# Patient Record
Sex: FEMALE | Race: WHITE | NOT HISPANIC OR LATINO | URBAN - METROPOLITAN AREA
[De-identification: names, ages, dates, MRNs, and addresses within clinical notes are randomized per-mention and may not be internally consistent; named-entity substitution may affect disease eponyms.]

---

## 2017-07-25 ENCOUNTER — EMERGENCY (EMERGENCY)
Facility: HOSPITAL | Age: 22
LOS: 1 days | Discharge: HOME | End: 2017-07-25
Attending: EMERGENCY MEDICINE | Admitting: EMERGENCY MEDICINE
Payer: COMMERCIAL

## 2017-07-25 VITALS
OXYGEN SATURATION: 97 % | SYSTOLIC BLOOD PRESSURE: 142 MMHG | WEIGHT: 136.03 LBS | RESPIRATION RATE: 16 BRPM | DIASTOLIC BLOOD PRESSURE: 83 MMHG | HEART RATE: 84 BPM | TEMPERATURE: 98 F

## 2017-07-25 DIAGNOSIS — F41.0 PANIC DISORDER [EPISODIC PAROXYSMAL ANXIETY]: ICD-10-CM

## 2017-07-25 DIAGNOSIS — E78.5 HYPERLIPIDEMIA, UNSPECIFIED: ICD-10-CM

## 2017-07-25 DIAGNOSIS — F43.0 ACUTE STRESS REACTION: ICD-10-CM

## 2017-07-25 PROCEDURE — 99282 EMERGENCY DEPT VISIT SF MDM: CPT

## 2017-07-25 PROCEDURE — 99284 EMERGENCY DEPT VISIT MOD MDM: CPT | Mod: 25
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
## 2017-07-25 NOTE — ED ADULT NURSE NOTE - CHPI ED SYMPTOMS NEG
no diaphoresis/no fever/no cough/no nausea/no chills/no vomiting/no syncope/no back pain/no chest pain/no shortness of breath/no dizziness

## 2017-08-29 ENCOUNTER — TRANSCRIPTION ENCOUNTER (OUTPATIENT)
Age: 22
End: 2017-08-29

## 2017-09-01 ENCOUNTER — TRANSCRIPTION ENCOUNTER (OUTPATIENT)
Age: 22
End: 2017-09-01

## 2018-03-01 ENCOUNTER — EMERGENCY (EMERGENCY)
Facility: HOSPITAL | Age: 23
LOS: 1 days | Discharge: HOME | End: 2018-03-01
Attending: EMERGENCY MEDICINE | Admitting: EMERGENCY MEDICINE
Payer: COMMERCIAL

## 2018-03-01 VITALS
HEART RATE: 89 BPM | TEMPERATURE: 98 F | SYSTOLIC BLOOD PRESSURE: 137 MMHG | OXYGEN SATURATION: 100 % | RESPIRATION RATE: 16 BRPM | DIASTOLIC BLOOD PRESSURE: 78 MMHG | HEIGHT: 63 IN | WEIGHT: 134.92 LBS

## 2018-03-01 VITALS
OXYGEN SATURATION: 99 % | DIASTOLIC BLOOD PRESSURE: 71 MMHG | TEMPERATURE: 98 F | SYSTOLIC BLOOD PRESSURE: 129 MMHG | HEART RATE: 81 BPM | RESPIRATION RATE: 17 BRPM

## 2018-03-01 DIAGNOSIS — Z98.890 OTHER SPECIFIED POSTPROCEDURAL STATES: Chronic | ICD-10-CM

## 2018-03-01 LAB
ALBUMIN SERPL ELPH-MCNC: 4.4 G/DL — SIGNIFICANT CHANGE UP (ref 3.3–5)
ALP SERPL-CCNC: 62 U/L — SIGNIFICANT CHANGE UP (ref 40–120)
ALT FLD-CCNC: 23 U/L — SIGNIFICANT CHANGE UP (ref 12–78)
ANION GAP SERPL CALC-SCNC: 9 MMOL/L — SIGNIFICANT CHANGE UP (ref 5–17)
AST SERPL-CCNC: 24 U/L — SIGNIFICANT CHANGE UP (ref 15–37)
BASOPHILS # BLD AUTO: 0.1 K/UL — SIGNIFICANT CHANGE UP (ref 0–0.2)
BASOPHILS NFR BLD AUTO: 1.3 % — SIGNIFICANT CHANGE UP (ref 0–2)
BILIRUB SERPL-MCNC: 0.5 MG/DL — SIGNIFICANT CHANGE UP (ref 0.2–1.2)
BUN SERPL-MCNC: 18 MG/DL — SIGNIFICANT CHANGE UP (ref 7–23)
CALCIUM SERPL-MCNC: 9.5 MG/DL — SIGNIFICANT CHANGE UP (ref 8.5–10.1)
CHLORIDE SERPL-SCNC: 108 MMOL/L — SIGNIFICANT CHANGE UP (ref 96–108)
CK MB BLD-MCNC: <0.3 % — SIGNIFICANT CHANGE UP (ref 0–3.5)
CK MB CFR SERPL CALC: <0.5 NG/ML — SIGNIFICANT CHANGE UP (ref 0–3.6)
CK SERPL-CCNC: 147 U/L — SIGNIFICANT CHANGE UP (ref 26–192)
CO2 SERPL-SCNC: 24 MMOL/L — SIGNIFICANT CHANGE UP (ref 22–31)
CREAT SERPL-MCNC: 1.1 MG/DL — SIGNIFICANT CHANGE UP (ref 0.5–1.3)
D DIMER BLD IA.RAPID-MCNC: <150 NG/ML DDU — SIGNIFICANT CHANGE UP
EOSINOPHIL # BLD AUTO: 0.1 K/UL — SIGNIFICANT CHANGE UP (ref 0–0.5)
EOSINOPHIL NFR BLD AUTO: 0.9 % — SIGNIFICANT CHANGE UP (ref 0–6)
GLUCOSE SERPL-MCNC: 92 MG/DL — SIGNIFICANT CHANGE UP (ref 70–99)
HCG SERPL-ACNC: <1 MIU/ML — SIGNIFICANT CHANGE UP
HCT VFR BLD CALC: 40.9 % — SIGNIFICANT CHANGE UP (ref 34.5–45)
HGB BLD-MCNC: 14.1 G/DL — SIGNIFICANT CHANGE UP (ref 11.5–15.5)
LYMPHOCYTES # BLD AUTO: 2.7 K/UL — SIGNIFICANT CHANGE UP (ref 1–3.3)
LYMPHOCYTES # BLD AUTO: 33.6 % — SIGNIFICANT CHANGE UP (ref 13–44)
MCHC RBC-ENTMCNC: 31 PG — SIGNIFICANT CHANGE UP (ref 27–34)
MCHC RBC-ENTMCNC: 34.4 GM/DL — SIGNIFICANT CHANGE UP (ref 32–36)
MCV RBC AUTO: 90.2 FL — SIGNIFICANT CHANGE UP (ref 80–100)
MONOCYTES # BLD AUTO: 0.6 K/UL — SIGNIFICANT CHANGE UP (ref 0–0.9)
MONOCYTES NFR BLD AUTO: 7.4 % — SIGNIFICANT CHANGE UP (ref 1–9)
NEUTROPHILS # BLD AUTO: 4.6 K/UL — SIGNIFICANT CHANGE UP (ref 1.8–7.4)
NEUTROPHILS NFR BLD AUTO: 56.8 % — SIGNIFICANT CHANGE UP (ref 43–77)
PLATELET # BLD AUTO: 256 K/UL — SIGNIFICANT CHANGE UP (ref 150–400)
POTASSIUM SERPL-MCNC: 4.4 MMOL/L — SIGNIFICANT CHANGE UP (ref 3.5–5.3)
POTASSIUM SERPL-SCNC: 4.4 MMOL/L — SIGNIFICANT CHANGE UP (ref 3.5–5.3)
PROT SERPL-MCNC: 8.6 G/DL — HIGH (ref 6–8.3)
RBC # BLD: 4.54 M/UL — SIGNIFICANT CHANGE UP (ref 3.8–5.2)
RBC # FLD: 10.8 % — SIGNIFICANT CHANGE UP (ref 10.3–14.5)
SODIUM SERPL-SCNC: 141 MMOL/L — SIGNIFICANT CHANGE UP (ref 135–145)
TROPONIN I SERPL-MCNC: <.015 NG/ML — SIGNIFICANT CHANGE UP (ref 0.01–0.04)
WBC # BLD: 8.1 K/UL — SIGNIFICANT CHANGE UP (ref 3.8–10.5)
WBC # FLD AUTO: 8.1 K/UL — SIGNIFICANT CHANGE UP (ref 3.8–10.5)

## 2018-03-01 PROCEDURE — 82550 ASSAY OF CK (CPK): CPT

## 2018-03-01 PROCEDURE — 84484 ASSAY OF TROPONIN QUANT: CPT

## 2018-03-01 PROCEDURE — 84702 CHORIONIC GONADOTROPIN TEST: CPT

## 2018-03-01 PROCEDURE — 85027 COMPLETE CBC AUTOMATED: CPT

## 2018-03-01 PROCEDURE — 71046 X-RAY EXAM CHEST 2 VIEWS: CPT

## 2018-03-01 PROCEDURE — 99284 EMERGENCY DEPT VISIT MOD MDM: CPT | Mod: 25

## 2018-03-01 PROCEDURE — 99284 EMERGENCY DEPT VISIT MOD MDM: CPT

## 2018-03-01 PROCEDURE — 80053 COMPREHEN METABOLIC PANEL: CPT

## 2018-03-01 PROCEDURE — 96374 THER/PROPH/DIAG INJ IV PUSH: CPT

## 2018-03-01 PROCEDURE — 93005 ELECTROCARDIOGRAM TRACING: CPT

## 2018-03-01 PROCEDURE — 82553 CREATINE MB FRACTION: CPT

## 2018-03-01 PROCEDURE — 71046 X-RAY EXAM CHEST 2 VIEWS: CPT | Mod: 26

## 2018-03-01 PROCEDURE — 85379 FIBRIN DEGRADATION QUANT: CPT

## 2018-03-01 RX ORDER — KETOROLAC TROMETHAMINE 30 MG/ML
15 SYRINGE (ML) INJECTION ONCE
Qty: 0 | Refills: 0 | Status: DISCONTINUED | OUTPATIENT
Start: 2018-03-01 | End: 2018-03-01

## 2018-03-01 RX ADMIN — Medication 15 MILLIGRAM(S): at 15:52

## 2018-03-01 NOTE — ED PROVIDER NOTE - PROGRESS NOTE DETAILS
patient resting comfortably, results discussed, father at bedside, advised follow up with her cardiologist, copy of results given

## 2018-03-01 NOTE — ED ADULT NURSE NOTE - OBJECTIVE STATEMENT
Patient is stable and able to make her needs known c/o chest pain cardiac work up complete. No acute distress noted. MSpencer

## 2018-03-01 NOTE — ED PROVIDER NOTE - MEDICAL DECISION MAKING DETAILS
left chest pain, ekg nsr, hx of afib with ablation, will obtain labs, cxr, give pain med Bossman: left chest pain, ekg nsr, hx of afib with ablation, will obtain labs, cxr, give pain med

## 2018-03-01 NOTE — ED PROVIDER NOTE - OBJECTIVE STATEMENT
22 y female presents with episode of nonradiating sharp left chest pain, states began today while lying down watching tv, pain was a 10 at the time, has now resolved, has developed dry cough since arriving to the ED, states she has had intermittent sob with activityfor several months.  hx of afib 1 years ago , had ablation done by Cardiologist Dr Rosales, at [facility].  no new meds, no new diets, nonsmoker, no etoh, denies trauma or strain,  states she takes synthroid for hypothyroid , states she traveled to [place] in January.  PMD Dr Taylor

## 2018-08-14 ENCOUNTER — TRANSCRIPTION ENCOUNTER (OUTPATIENT)
Age: 23
End: 2018-08-14

## 2018-08-22 ENCOUNTER — APPOINTMENT (OUTPATIENT)
Dept: CARDIOLOGY | Facility: CLINIC | Age: 23
End: 2018-08-22
Payer: COMMERCIAL

## 2018-08-22 ENCOUNTER — NON-APPOINTMENT (OUTPATIENT)
Age: 23
End: 2018-08-22

## 2018-08-22 ENCOUNTER — RESULT CHARGE (OUTPATIENT)
Age: 23
End: 2018-08-22

## 2018-08-22 VITALS
BODY MASS INDEX: 25.52 KG/M2 | HEIGHT: 63 IN | SYSTOLIC BLOOD PRESSURE: 139 MMHG | HEART RATE: 79 BPM | DIASTOLIC BLOOD PRESSURE: 82 MMHG | WEIGHT: 144 LBS | OXYGEN SATURATION: 100 %

## 2018-08-22 PROCEDURE — 93000 ELECTROCARDIOGRAM COMPLETE: CPT

## 2018-08-22 PROCEDURE — 99204 OFFICE O/P NEW MOD 45 MIN: CPT

## 2018-08-23 ENCOUNTER — NON-APPOINTMENT (OUTPATIENT)
Age: 23
End: 2018-08-23

## 2018-08-23 PROBLEM — I48.91 UNSPECIFIED ATRIAL FIBRILLATION: Chronic | Status: ACTIVE | Noted: 2018-03-01

## 2018-08-23 PROBLEM — E03.9 HYPOTHYROIDISM, UNSPECIFIED: Chronic | Status: ACTIVE | Noted: 2018-03-01

## 2018-08-24 ENCOUNTER — APPOINTMENT (OUTPATIENT)
Dept: ELECTROPHYSIOLOGY | Facility: CLINIC | Age: 23
End: 2018-08-24

## 2018-08-29 ENCOUNTER — APPOINTMENT (OUTPATIENT)
Dept: CARDIOLOGY | Facility: CLINIC | Age: 23
End: 2018-08-29
Payer: COMMERCIAL

## 2018-08-29 PROCEDURE — 93306 TTE W/DOPPLER COMPLETE: CPT

## 2019-01-09 ENCOUNTER — TRANSCRIPTION ENCOUNTER (OUTPATIENT)
Age: 24
End: 2019-01-09

## 2019-01-18 ENCOUNTER — EMERGENCY (EMERGENCY)
Facility: HOSPITAL | Age: 24
LOS: 1 days | Discharge: HOME | End: 2019-01-18
Attending: EMERGENCY MEDICINE
Payer: COMMERCIAL

## 2019-01-18 VITALS
DIASTOLIC BLOOD PRESSURE: 75 MMHG | RESPIRATION RATE: 12 BRPM | TEMPERATURE: 98 F | HEART RATE: 88 BPM | SYSTOLIC BLOOD PRESSURE: 124 MMHG | OXYGEN SATURATION: 100 %

## 2019-01-18 VITALS
OXYGEN SATURATION: 100 % | DIASTOLIC BLOOD PRESSURE: 79 MMHG | WEIGHT: 134.92 LBS | SYSTOLIC BLOOD PRESSURE: 136 MMHG | TEMPERATURE: 98 F | HEIGHT: 63 IN | RESPIRATION RATE: 14 BRPM | HEART RATE: 91 BPM

## 2019-01-18 DIAGNOSIS — Z98.890 OTHER SPECIFIED POSTPROCEDURAL STATES: Chronic | ICD-10-CM

## 2019-01-18 PROCEDURE — 99284 EMERGENCY DEPT VISIT MOD MDM: CPT

## 2019-01-18 PROCEDURE — 99283 EMERGENCY DEPT VISIT LOW MDM: CPT

## 2019-01-18 RX ORDER — DIPHENHYDRAMINE HCL 50 MG
50 CAPSULE ORAL ONCE
Qty: 0 | Refills: 0 | Status: COMPLETED | OUTPATIENT
Start: 2019-01-18 | End: 2019-01-18

## 2019-01-18 RX ORDER — LEVOTHYROXINE SODIUM 125 MCG
1 TABLET ORAL
Qty: 0 | Refills: 0 | COMMUNITY

## 2019-01-18 RX ORDER — FAMOTIDINE 10 MG/ML
20 INJECTION INTRAVENOUS ONCE
Qty: 0 | Refills: 0 | Status: COMPLETED | OUTPATIENT
Start: 2019-01-18 | End: 2019-01-18

## 2019-01-18 RX ADMIN — FAMOTIDINE 20 MILLIGRAM(S): 10 INJECTION INTRAVENOUS at 17:12

## 2019-01-18 RX ADMIN — Medication 60 MILLIGRAM(S): at 17:12

## 2019-01-18 RX ADMIN — Medication 50 MILLIGRAM(S): at 17:12

## 2019-01-18 NOTE — ED PROVIDER NOTE - OBJECTIVE STATEMENT
22 yo white female with recurrent episodes of facial swelling x years w/o identifiable etiology and despite evaluation by PCP and Dermatology. Today similar symptoms occurred described as full swelling sensation to left upper lip and under both eyes. No chest pain and no SOB. In addition, no new soaps, colognes, detergents, medication or recent illnesses. In the past patient has also experienced warm sensation to face and upper chest.

## 2019-01-18 NOTE — ED ADULT NURSE NOTE - OBJECTIVE STATEMENT
PT with chronic allgx angioedema symptoms, no SOB, has been to several dr's with little improvements, afebrile, skin intact, Dr ordered PO medications, will continue to monitor pt

## 2019-01-18 NOTE — ED ADULT TRIAGE NOTE - CHIEF COMPLAINT QUOTE
"For the last 4 days my tongue has been swelling on and off.  pt states that "all my life I have gotten blotchy and hives and my face and tongue swell on and off - I must have some sort of autoimmune disorder - today my doctor told me to go to the ER."

## 2019-01-18 NOTE — ED ADULT NURSE NOTE - NSIMPLEMENTINTERV_GEN_ALL_ED
Implemented All Universal Safety Interventions:  [place] to call system. Call bell, personal items and telephone within reach. Instruct patient to call for assistance. Room bathroom lighting operational. Non-slip footwear when patient is off stretcher. Physically safe environment: no spills, clutter or unnecessary equipment. Stretcher in lowest position, wheels locked, appropriate side rails in place.

## 2019-01-18 NOTE — ED PROVIDER NOTE - CHPI ED SYMPTOMS NEG
no difficulty breathing/no throat itching/no shortness of breath/no wheezing/no difficulty swallowing

## 2019-07-16 ENCOUNTER — TRANSCRIPTION ENCOUNTER (OUTPATIENT)
Age: 24
End: 2019-07-16

## 2019-09-15 ENCOUNTER — TRANSCRIPTION ENCOUNTER (OUTPATIENT)
Age: 24
End: 2019-09-15

## 2019-11-25 ENCOUNTER — EMERGENCY (EMERGENCY)
Facility: HOSPITAL | Age: 24
LOS: 1 days | Discharge: HOME | End: 2019-11-25
Attending: EMERGENCY MEDICINE
Payer: COMMERCIAL

## 2019-11-25 VITALS
SYSTOLIC BLOOD PRESSURE: 133 MMHG | DIASTOLIC BLOOD PRESSURE: 86 MMHG | WEIGHT: 139.99 LBS | HEIGHT: 63 IN | TEMPERATURE: 98 F | RESPIRATION RATE: 16 BRPM | OXYGEN SATURATION: 99 % | HEART RATE: 96 BPM

## 2019-11-25 DIAGNOSIS — Z98.890 OTHER SPECIFIED POSTPROCEDURAL STATES: Chronic | ICD-10-CM

## 2019-11-25 PROCEDURE — 99283 EMERGENCY DEPT VISIT LOW MDM: CPT

## 2019-11-25 NOTE — ED PROVIDER NOTE - NSFOLLOWUPCLINICS_GEN_ALL_ED_FT
[facility] Orthopedic Surgery  Orthopedic Surgery  [address], 3rd & 4th [place] [address]  Phone: [phone]  Fax:   Follow Up Time:

## 2019-11-25 NOTE — ED PROVIDER NOTE - CLINICAL SUMMARY MEDICAL DECISION MAKING FREE TEXT BOX
Patient presents with ED post ankle sprain with posterior splint in place.  Patient reports cold, purple toes.  Toes with cap refill less than 2 sec and not abnormally cold to touch.  Patient placed in stirrup splint with a less tight ace.  Patient has good f/u with her orthopedist and comfortable with plan to f/u.  Patient symptoms resolved after removal of ACE.

## 2019-11-25 NOTE — ED PROVIDER NOTE - ATTENDING CONTRIBUTION TO CARE
I, Tyrese Alberts, performed a history and physical exam of the patient and discussed their management with the resident and /or advanced care provider. I reviewed the resident and /or ACP's note and agree with the documented findings and plan of care. I was present and available for all procedures.  Patient stable for f/u, splint replaced and ACE replaced.  Symptoms resolved and patient is more comfortable and has f/u appointment with orthopedist.

## 2019-11-25 NOTE — ED PROVIDER NOTE - OBJECTIVE STATEMENT
24 y.o. female coming in with a painful swollen and cold left foot ankle after a sprain 4 days ago.  Pt inverted her ankle 4 days ago, had negative Xrays, was placed in a posterior splint and has not been weight bearing since.  Over the past couple days swelling and ecchymosis has worsened.  Also the foot has been colder than the right.  Nothing makes her complaints better, and movement makes it worse.

## 2019-11-25 NOTE — ED ADULT TRIAGE NOTE - CHIEF COMPLAINT QUOTE
Patient returned from [facility] 11/20/19  left ankle pain/injury 4 days ago, seen by orthopedist 3 days ago  Patient c/o persistent pain, swelling to the left foot

## 2019-11-25 NOTE — ED ADULT NURSE NOTE - OBJECTIVE STATEMENT
23y/o female presented to the ED from home with complaint of left ankle pain. A&Ox3. Patient states that she rolled her ankle x4 days ago, was seen in ED, negative x-rays, placed in splint and was told to follow up with ortho. Patient comes to ED today with complaint of worsening swelling and bruising of left ankle also states that the left foot feels cold even with a sock on . +pedal pulses, +cap refill.

## 2019-11-25 NOTE — ED PROCEDURE NOTE - ATTENDING CONTRIBUTION TO CARE
I, Tyrese Alberts, performed a history and physical exam of the patient and discussed their management with the resident and /or advanced care provider. I reviewed the resident and /or ACP's note and agree with the documented findings and plan of care. I was present and available for all procedures.  No complication of splint application.

## 2019-11-25 NOTE — ED PROVIDER NOTE - PATIENT PORTAL LINK FT
You can access the FollowMyHealth Patient Portal offered by [facility] by registering at the following website: [email or web address]. By joining [employer]’s FollowMyHealth portal, you will also be able to view your health information using other applications (apps) compatible with our system.

## 2019-11-25 NOTE — ED PROVIDER NOTE - NSFOLLOWUPINSTRUCTIONS_ED_ALL_ED_FT
1- Motrin 600 mg every 6 hours for pain  2- Weight bearing as tolerated  3- Follow up with orthopedics as an out patient.  4- Any worsening pain, numbness, weakness, or any other concerns return to ER immediately

## 2019-11-25 NOTE — ED ADULT NURSE NOTE - NS_SISCREENINGSR_GEN_ALL_ED
Reason For Visit  PARDEEP PATTON is an established patient here today for an annual physical.   A chaperone is not applicable.        Quality    Adult Wellness CI height documented, discussion of regular exercise, exercising regularly, printed information given for activities, discussion of nutritional quality of diet, patient education given about proper diet, alcohol use, not having considered quitting drinking, not getting angry when talked to about drinking, not having a drink or two in the morning to get going, not drinking alcohol regularly, and feeling guilty about it, no tobacco use, pap smear performed: 07/01/2016, does not have feelings of hopelessness (PHQ-2), no Anhedonia (PHQ-2), not referred to local mental health center, not taking medication for depression, no monitoring patient, no preventive medicine therapy for influenza, no preventive medicine therapy for pneumococcal, pain scale level not reviewed, has not fallen within the last 12 months and able to walk.      History of Present Illness    1. vertigo  -ongoing, for past two days, no prior episodes like this; each episode lasts for seconds then goes away   -first episode occurred when she was talking to her mom at home; suddenly, her vision started to spin and her left side of the body became weak, making her lose balance and grab on to the washer she was standing next to in order to prevent from falling; no loss of consciousness  -episodes occurred 5 more times since then, not associated with positional changes, totally random  -denies any new source of stressors in her life; good relationship status with parents she lives with and boyfriend who currently lives in [place]. has one daughter whom gave birth to 5 years ago via vaginal delivery on term  -denies any tinnitus, recent vision problems, headache, hx of seizures, cardiac conditions in herself or in family, migraines, hormone conditions  -menstrual cycle is 28 days, menstruates for 4  days, changes about 4-5 pads a day which has always been normal for her  -works at a restaurant as a ; no stressor from there; no tobacco use at this time, but did smoke for 5+ years x 5 cigarettes/day; no recreational drug use or alcohol consumption.      Review of Systems    Const: Normal.   Allergy & Immunology: Normal.   Eyes: Normal.   ENT: Normal.   CV: Normal.   Resp: Normal.   Breast: Normal.   GI: Normal.   : Normal.   Endo: Normal.   Heme/Lymph: Normal.   Musc: Normal.   Neuro: dizziness and feeling weak, but no fainting, no headache, no memory lapses or loss, no numbness, no seizures and no tremors . Per HPI.   Psych: anxiety, but no depression, no insomnia, normal enjoyment of activities, not feeling sad, unhappy, no ideations and not suicidal . Per HPI.   Skin: Normal.       Allergies  No Known Drug Allergies    Current Meds   1. No Reported Medications Recorded    Active Problems  History of Birth Control Method - Oral Contraceptives  Encounter for cervical Pap smear with pelvic exam (Z01.419)  Encounter for routine pelvic examination (Z01.419)  Visit for routine gyn exam (Z01.419)    Past Medical History  History of Contraceptives  History of alopecia areata (Z87.2)    Family History  Mother   No pertinent family history    Social History  History of Birth Control Method - Oral Contraceptives  Birth Control Method - Transdermal Patch  Former smoker (Z87.891)   · here for an annual gyn exam  Minimum alcohol consumption  Personal history of nicotine dependence (Z87.891)  Tobacco use (Z72.0)    Review  Past medical history, problem list, family medical history, surgical history and social history reviewed.      Vitals  Signs   Recorded: 2018 01:58PM   Height: 5 ft 4.96 in  Weight: 114 lb 13.74 oz  BMI Calculated: 19.14  BSA Calculated: 1.56  Systolic: 125  Diastolic: 83  Systolic - Lyin, LUE, Supine  Diastolic - Lyin, LUE, Supine  Heart Rate Lyin  Systolic - Sittin,  LUE, Sitting  Diastolic - Sittin, LUE, Sitting  Heart Rate Sittin  Systolic - Standin, LUE, Standing  Diastolic - Standin, LUE, Standing  Heart Rate Standin  Temperature: 36.8 C  Heart Rate: 62  Respiration: 18    Physical Exam  Constitutional: alert, in no acute distress and current vital signs reviewed.   Head and Face: atraumatic, no deformities, normocephalic, normal facies.   Eyes: no discharge, normal conjunctiva, no eyelid swelling, no ptosis and the sclerae were normal. pupils equal, round and reactive to light and accommodation, conjugate gaze and extraocular movements were intact.   ENT: normal appearing outer ear, normal appearing nose. examination of the tympanic membrane showed normal landmarks, normal appearing external canal. nasal mucosa moist and pink, no nasal discharge. normal lips. oral mucosa pink and moist, no oral lesions.   Neck: normal appearing neck, supple neck and no mass was seen. thyroid not enlarged and no thyroid nodules.   Lymphatic: no lymphadenopathy.   Chest: normal chest appearance.   Pulmonary: no respiratory distress, normal respiratory rate and effort and no accessory muscle use. breath sounds clear to auscultation bilaterally.   Cardiovascular: normal rate, no murmurs were heard, regular rhythm, normal S1 and normal S2. edema was not present in the lower extremities.   Abdomen: soft, nontender, nondistended, normal bowel sounds and no abdominal mass. no hepatomegaly and no splenomegaly. no umbilical hernia was discovered.   Musculoskeletal: normal gait. no musculoskeletal erythema was seen, no joint swelling seen and no joint tenderness was elicited. no scoliosis. normal range of motion. there was no joint instability noted. muscle strength and tone were normal.   Neurologic: cranial nerves grossly intact . dicks-hallpike normal. normal DTRs. no sensory deficits noted. no coordination deficits. normal gait. muscle strength and tone were normal.    Psychiatric: oriented to person, oriented to place and oriented to time. alert and awake, interactive and mood/affect were appropriate. judgement not impaired. normal attention span. short term memory intact.   Skin, Hair, Nails: normal skin color and pigmentation and no rash. no foot ulcers and no skin ulcer was seen. normal skin turgor. no clubbing or cyanosis of the fingernails.      Immunizations  Tdap --- Series1: 15-Dec-2017     Assessment  Vertigo (R42)    Plan  CBC WITH AUTOMATED DIFFERENTIAL; Status:Active; Requested for:16Nov2018;   Plans:     Plan:     1. vertigo  -Will check orthostatic hypotension blood pressure reading. Patient advised to keep herself well-hydrated and fed at all times. Recommended to keep a diary of her symptoms and vertigo episodes until she follows up in 2-3 weeks time. Will order complete blood count to rule out anemia that may be contributing to her symptoms.      Attending Note  Attending Statement: I discussed the patient's case with the Resident. I agree with the Resident's findings and would also recommend:, orthostatic vital sings wnl. discussed with resident to have a diary on timing of the above symptoms. If CBC wnl, and still with persistence of symptoms can consider EKG  are these witnessed symptoms ? consider seizure episodes as her symptoms are not typical for vertigo     -LINDA Lyle      Signatures   Electronically signed by : TAMICA Pineda; Nov 16 2018  2:01PM CST    Electronically signed by : TAMICA Pineda; Nov 16 2018  2:02PM CST    Electronically signed by : TAMICA Pineda; Nov 16 2018  2:03PM CST    Electronically signed by : BRAD CASAS M.D.; Nov 16 2018  4:16PM CST (Co-author)    Electronically signed by : YANN KHALIL MD; Nov 19 2018 11:42AM CST     Negative

## 2019-11-25 NOTE — ED PROVIDER NOTE - MUSCULOSKELETAL, MLM
left knee NT full ROM, left ankle with + edema and tenderness over distal lateral mal and TL.  Left foot NT.  + 2 DP left foot with good cap refill

## 2019-11-25 NOTE — ED ADULT NURSE NOTE - CHIEF COMPLAINT QUOTE
Patient returned from [facility] 11/20/19  left ankle pain/injury 4 days ago, seen by orthopedist 3 days ago  Patient c/o persistent pain, swelling to the left foot

## 2021-03-08 ENCOUNTER — NON-APPOINTMENT (OUTPATIENT)
Age: 26
End: 2021-03-08

## 2021-03-08 ENCOUNTER — APPOINTMENT (OUTPATIENT)
Dept: INTERNAL MEDICINE | Facility: CLINIC | Age: 26
End: 2021-03-08
Payer: COMMERCIAL

## 2021-03-08 ENCOUNTER — TRANSCRIPTION ENCOUNTER (OUTPATIENT)
Age: 26
End: 2021-03-08

## 2021-03-08 VITALS
SYSTOLIC BLOOD PRESSURE: 100 MMHG | HEIGHT: 63 IN | TEMPERATURE: 98.9 F | WEIGHT: 160 LBS | OXYGEN SATURATION: 98 % | HEART RATE: 100 BPM | DIASTOLIC BLOOD PRESSURE: 76 MMHG | BODY MASS INDEX: 28.35 KG/M2

## 2021-03-08 DIAGNOSIS — K59.09 OTHER CONSTIPATION: ICD-10-CM

## 2021-03-08 DIAGNOSIS — R60.1 GENERALIZED EDEMA: ICD-10-CM

## 2021-03-08 DIAGNOSIS — Z00.00 ENCOUNTER FOR GENERAL ADULT MEDICAL EXAMINATION W/OUT ABNORMAL FINDINGS: ICD-10-CM

## 2021-03-08 DIAGNOSIS — G90.50 COMPLEX REGIONAL PAIN SYNDROME I, UNSPECIFIED: ICD-10-CM

## 2021-03-08 PROCEDURE — 99385 PREV VISIT NEW AGE 18-39: CPT | Mod: 25

## 2021-03-08 PROCEDURE — 99072 ADDL SUPL MATRL&STAF TM PHE: CPT

## 2021-03-08 PROCEDURE — 36415 COLL VENOUS BLD VENIPUNCTURE: CPT

## 2021-03-08 NOTE — PLAN
[FreeTextEntry1] : Check routine fasting labs.\par Discussed diet, exercise, and weight maintenance.\par Vaccines UTD. She will receive COVID vaccine #2 in a few weeks. \par She was encouraged to follow-up with GYN for her annual and PAP smear. Prior on OCP.\par She is followed with pain management for CRPS. Continue Lyrica 150 mg BID. \par Chronic constipation - on daily Colace and magnesium supplement. \par Recent ED admission in 1/21 for abdominal pain and transaminitis. Repeat LFT's. Pain has improved.\par \par RTO in 1 yr for annual exam or earlier PRN for acute care.\par

## 2021-03-08 NOTE — HEALTH RISK ASSESSMENT
[Excellent] : ~his/her~  mood as  excellent [No] : In the past 12 months have you used drugs other than those required for medical reasons? No [No falls in past year] : Patient reported no falls in the past year [0] : 2) Feeling down, depressed, or hopeless: Not at all (0) [Patient declined mammogram] : Patient declined mammogram [Patient reported PAP Smear was normal] : Patient reported PAP Smear was normal [] : No [Alone] : lives alone [Employed] : employed [College] : College [Significant Other] : lives with significant other [Sexually Active] : sexually active [High Risk Behavior] : no high risk behavior [Reports changes in hearing] : Reports no changes in hearing [Reports changes in vision] : Reports no changes in vision [Reports changes in dental health] : Reports no changes in dental health [Smoke Detector] : smoke detector [Carbon Monoxide Detector] : carbon monoxide detector [Seat Belt] :  uses seat belt [Sunscreen] : uses sunscreen [PapSmearDate] : 03/20 [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

## 2021-03-08 NOTE — HISTORY OF PRESENT ILLNESS
[FreeTextEntry1] : new patient [de-identified] : 25 year old F comes to establish medical care and for an annual exam. Previously followed by her pediatrician Dr. Steve Taylor in [place], has been going to him since birth.\par \par She went to [facility] ED in 1/21 for right-sided abdominal pain. The LFT's were elevated. CT showed "inflamed intestines." She has chronic constipation since birth. \par She had left ankle surgery for torn ATFL in 10/20. Was in cast for 6 weeks. She developed post-op pain in left leg and was diagnosed with CRPS. She sees pain management and takes Lyrica, dose increased last month but has not provided much relief.

## 2021-03-08 NOTE — PAST MEDICAL HISTORY
[Menstruating] : menstruating [Normal Amount/Duration] : it was of a normal amount and duration [Regular Cycle Intervals] : have been regular [Total Preg ___] : G[unfilled]

## 2021-03-08 NOTE — PHYSICAL EXAM
[No Acute Distress] : no acute distress [Well Nourished] : well nourished [Well Developed] : well developed [Well-Appearing] : well-appearing [Normal Sclera/Conjunctiva] : normal sclera/conjunctiva [PERRL] : pupils equal round and reactive to light [EOMI] : extraocular movements intact [Normal Outer Ear/Nose] : the outer ears and nose were normal in appearance [Normal Oropharynx] : the oropharynx was normal [Normal TMs] : both tympanic membranes were normal [No Lymphadenopathy] : no lymphadenopathy [Supple] : supple [Thyroid Normal, No Nodules] : the thyroid was normal and there were no nodules present [No Respiratory Distress] : no respiratory distress  [No Accessory Muscle Use] : no accessory muscle use [Clear to Auscultation] : lungs were clear to auscultation bilaterally [Normal Rate] : normal rate  [Regular Rhythm] : with a regular rhythm [Normal S1, S2] : normal S1 and S2 [No Murmur] : no murmur heard [No Varicosities] : no varicosities [Pedal Pulses Present] : the pedal pulses are present [No Edema] : there was no peripheral edema [No Extremity Clubbing/Cyanosis] : no extremity clubbing/cyanosis [Soft] : abdomen soft [Non Tender] : non-tender [Non-distended] : non-distended [No Masses] : no abdominal mass palpated [No HSM] : no HSM [Normal Bowel Sounds] : normal bowel sounds [Normal Posterior Cervical Nodes] : no posterior cervical lymphadenopathy [Normal Anterior Cervical Nodes] : no anterior cervical lymphadenopathy [No CVA Tenderness] : no CVA  tenderness [No Spinal Tenderness] : no spinal tenderness [No Joint Swelling] : no joint swelling [Grossly Normal Strength/Tone] : grossly normal strength/tone [No Rash] : no rash [Coordination Grossly Intact] : coordination grossly intact [No Focal Deficits] : no focal deficits [Normal Gait] : normal gait [Deep Tendon Reflexes (DTR)] : deep tendon reflexes were 2+ and symmetric [Normal Affect] : the affect was normal [Normal Insight/Judgement] : insight and judgment were intact [Normal Mood] : the mood was normal [de-identified] : very friendly [de-identified] : bilateral cerumen R>L  [de-identified] : defer to GYN

## 2021-03-09 ENCOUNTER — TRANSCRIPTION ENCOUNTER (OUTPATIENT)
Age: 26
End: 2021-03-09

## 2021-03-09 LAB
25(OH)D3 SERPL-MCNC: 25.1 NG/ML
ALBUMIN SERPL ELPH-MCNC: 4.8 G/DL
ALP BLD-CCNC: 94 U/L
ALT SERPL-CCNC: 112 U/L
ANION GAP SERPL CALC-SCNC: 12 MMOL/L
APPEARANCE: ABNORMAL
AST SERPL-CCNC: 50 U/L
BACTERIA: ABNORMAL
BASOPHILS # BLD AUTO: 0.02 K/UL
BASOPHILS NFR BLD AUTO: 0.4 %
BILIRUB SERPL-MCNC: 0.3 MG/DL
BILIRUBIN URINE: NEGATIVE
BLOOD URINE: NEGATIVE
BUN SERPL-MCNC: 18 MG/DL
CALCIUM SERPL-MCNC: 9.7 MG/DL
CHLORIDE SERPL-SCNC: 105 MMOL/L
CHOLEST SERPL-MCNC: 209 MG/DL
CO2 SERPL-SCNC: 24 MMOL/L
COLOR: YELLOW
CREAT SERPL-MCNC: 0.64 MG/DL
EOSINOPHIL # BLD AUTO: 0.07 K/UL
EOSINOPHIL NFR BLD AUTO: 1.4 %
ESTIMATED AVERAGE GLUCOSE: 111 MG/DL
GLUCOSE QUALITATIVE U: NEGATIVE
GLUCOSE SERPL-MCNC: 84 MG/DL
HBA1C MFR BLD HPLC: 5.5 %
HCT VFR BLD CALC: 44.2 %
HDLC SERPL-MCNC: 64 MG/DL
HGB BLD-MCNC: 14.1 G/DL
HYALINE CASTS: 0 /LPF
IMM GRANULOCYTES NFR BLD AUTO: 0.2 %
KETONES URINE: NORMAL
LDLC SERPL CALC-MCNC: 133 MG/DL
LEUKOCYTE ESTERASE URINE: ABNORMAL
LYMPHOCYTES # BLD AUTO: 1.89 K/UL
LYMPHOCYTES NFR BLD AUTO: 37.5 %
MAN DIFF?: NORMAL
MCHC RBC-ENTMCNC: 29.9 PG
MCHC RBC-ENTMCNC: 31.9 GM/DL
MCV RBC AUTO: 93.8 FL
MICROSCOPIC-UA: NORMAL
MONOCYTES # BLD AUTO: 0.42 K/UL
MONOCYTES NFR BLD AUTO: 8.3 %
NEUTROPHILS # BLD AUTO: 2.63 K/UL
NEUTROPHILS NFR BLD AUTO: 52.2 %
NITRITE URINE: NEGATIVE
NONHDLC SERPL-MCNC: 145 MG/DL
PH URINE: 6
PLATELET # BLD AUTO: 236 K/UL
POTASSIUM SERPL-SCNC: 4.3 MMOL/L
PROT SERPL-MCNC: 7.6 G/DL
PROTEIN URINE: NORMAL
RBC # BLD: 4.71 M/UL
RBC # FLD: 11.8 %
RED BLOOD CELLS URINE: 2 /HPF
SODIUM SERPL-SCNC: 141 MMOL/L
SPECIFIC GRAVITY URINE: 1.04
SQUAMOUS EPITHELIAL CELLS: 7 /HPF
TRIGL SERPL-MCNC: 57 MG/DL
TSH SERPL-ACNC: 2.19 UIU/ML
UROBILINOGEN URINE: NORMAL
VIT B12 SERPL-MCNC: 687 PG/ML
WBC # FLD AUTO: 5.04 K/UL
WHITE BLOOD CELLS URINE: 20 /HPF

## 2021-03-09 RX ORDER — MELOXICAM 15 MG/1
15 TABLET ORAL
Qty: 30 | Refills: 0 | Status: DISCONTINUED | COMMUNITY
Start: 2020-09-18

## 2021-03-09 RX ORDER — NAPROXEN 500 MG/1
500 TABLET ORAL
Qty: 14 | Refills: 0 | Status: DISCONTINUED | COMMUNITY
Start: 2021-01-22

## 2021-03-09 RX ORDER — DIAZEPAM 5 MG/1
5 TABLET ORAL
Qty: 20 | Refills: 0 | Status: DISCONTINUED | COMMUNITY
Start: 2020-11-11

## 2021-03-09 RX ORDER — CEPHALEXIN 500 MG/1
500 CAPSULE ORAL
Qty: 4 | Refills: 0 | Status: DISCONTINUED | COMMUNITY
Start: 2020-10-02

## 2021-03-09 RX ORDER — OXYCODONE AND ACETAMINOPHEN 5; 325 MG/1; MG/1
5-325 TABLET ORAL
Qty: 25 | Refills: 0 | Status: DISCONTINUED | COMMUNITY
Start: 2020-10-02

## 2021-03-09 RX ORDER — HYDROMORPHONE HYDROCHLORIDE 2 MG/1
2 TABLET ORAL
Qty: 20 | Refills: 0 | Status: DISCONTINUED | COMMUNITY
Start: 2020-11-05

## 2021-03-09 RX ORDER — MEDROXYPROGESTERONE ACETATE 150 MG/ML
150 INJECTION, SUSPENSION INTRAMUSCULAR
Qty: 1 | Refills: 0 | Status: DISCONTINUED | COMMUNITY
Start: 2020-02-24

## 2021-03-09 RX ORDER — PREGABALIN 75 MG/1
75 CAPSULE ORAL
Qty: 30 | Refills: 0 | Status: DISCONTINUED | COMMUNITY
Start: 2021-02-02

## 2021-03-09 RX ORDER — COVID-19 TEST SPECIMEN COLLECT
MISCELLANEOUS MISCELLANEOUS
Qty: 1 | Refills: 0 | Status: DISCONTINUED | COMMUNITY
Start: 2021-01-19

## 2021-03-09 RX ORDER — GABAPENTIN 100 MG/1
100 CAPSULE ORAL
Qty: 42 | Refills: 0 | Status: DISCONTINUED | COMMUNITY
Start: 2020-12-03

## 2021-03-09 RX ORDER — METOCLOPRAMIDE 10 MG/1
10 TABLET ORAL
Qty: 15 | Refills: 0 | Status: DISCONTINUED | COMMUNITY
Start: 2020-10-02

## 2021-03-09 RX ORDER — METHOCARBAMOL 750 MG/1
750 TABLET, FILM COATED ORAL
Qty: 80 | Refills: 0 | Status: DISCONTINUED | COMMUNITY
Start: 2020-09-18

## 2021-03-10 ENCOUNTER — TRANSCRIPTION ENCOUNTER (OUTPATIENT)
Age: 26
End: 2021-03-10

## 2021-03-10 LAB
HAV IGM SER QL: NONREACTIVE
HBV CORE IGM SER QL: NONREACTIVE
HBV SURFACE AG SER QL: NONREACTIVE
HCV AB SER QL: NONREACTIVE
HCV S/CO RATIO: 0.09 S/CO

## 2021-03-23 ENCOUNTER — TRANSCRIPTION ENCOUNTER (OUTPATIENT)
Age: 26
End: 2021-03-23

## 2021-06-10 ENCOUNTER — RESULT REVIEW (OUTPATIENT)
Age: 26
End: 2021-06-10

## 2021-07-09 NOTE — ED PROVIDER NOTE - CPE EDP RESP NORM
Cardiac Catheterization Procedure Note   Patient: Toro Carney  MRN: [id number]  SSN: [id number]   YOB: 1959 Age: 58 y.o. Sex: male    Date of Procedure: 7/9/2021   Pre-procedure Diagnosis: Coronary Artery Disease  Post-procedure Diagnosis: Coronary Artery Disease  Procedure: Left Heart Cath  :  Dr. Joie Matute MD    Assistant(s):  None  Anesthesia: Moderate Sedation   Estimated Blood Loss: Less than 10 mL   Specimens Removed: None  Findings:   Access: left radial artery  Catheters: JL5, JR4    Anatomy:  LM: ostial 80% stenosis, with dampening and ventricularization on engagement. LAD: minimal non-obstructive disease, with widely patent stent in the mid LAD and no ISR  LCx: no significant disease  RCA: minimal non-obstructive disease    Discussed with Dr Miguel Angel Enciso. Will need to decide between high risk LM PCI vs CABG.     Complications: None   Implants:  None  Signed by:  Joie Matute MD  7/9/2021  8:44 AM
normal...

## 2021-08-23 NOTE — ED ADULT NURSE NOTE - CAS TRG GEN SKIN COLOR
Discussed treatment options with patient, as well as risks, benefits and type of procedure for MRI results presented today.  Patient opted to try conservative management at this time.  He was given left knee steroid injection, tolerated procedure well.     Normal for race

## 2021-09-06 ENCOUNTER — OUTPATIENT (OUTPATIENT)
Dept: OUTPATIENT SERVICES | Facility: HOSPITAL | Age: 26
LOS: 1 days | End: 2021-09-06
Payer: COMMERCIAL

## 2021-09-06 ENCOUNTER — APPOINTMENT (OUTPATIENT)
Dept: ULTRASOUND IMAGING | Facility: CLINIC | Age: 26
End: 2021-09-06
Payer: COMMERCIAL

## 2021-09-06 DIAGNOSIS — Z98.890 OTHER SPECIFIED POSTPROCEDURAL STATES: Chronic | ICD-10-CM

## 2021-09-06 DIAGNOSIS — Z00.8 ENCOUNTER FOR OTHER GENERAL EXAMINATION: ICD-10-CM

## 2021-09-06 PROCEDURE — 76705 ECHO EXAM OF ABDOMEN: CPT | Mod: 26

## 2021-09-06 PROCEDURE — 76705 ECHO EXAM OF ABDOMEN: CPT

## 2021-09-20 ENCOUNTER — APPOINTMENT (OUTPATIENT)
Dept: INTERNAL MEDICINE | Facility: CLINIC | Age: 26
End: 2021-09-20
Payer: COMMERCIAL

## 2021-09-20 VITALS
WEIGHT: 158 LBS | TEMPERATURE: 98.5 F | DIASTOLIC BLOOD PRESSURE: 70 MMHG | SYSTOLIC BLOOD PRESSURE: 100 MMHG | HEIGHT: 63 IN | BODY MASS INDEX: 28 KG/M2

## 2021-09-20 DIAGNOSIS — M24.80 OTHER SPECIFIC JOINT DERANGEMENTS OF UNSPECIFIED JOINT, NOT ELSEWHERE CLASSIFIED: ICD-10-CM

## 2021-09-20 DIAGNOSIS — K82.4 CHOLESTEROLOSIS OF GALLBLADDER: ICD-10-CM

## 2021-09-20 PROCEDURE — G0008: CPT

## 2021-09-20 PROCEDURE — 99214 OFFICE O/P EST MOD 30 MIN: CPT | Mod: 25

## 2021-09-20 PROCEDURE — 90686 IIV4 VACC NO PRSV 0.5 ML IM: CPT

## 2021-09-20 PROCEDURE — 36415 COLL VENOUS BLD VENIPUNCTURE: CPT

## 2021-09-20 RX ORDER — PREGABALIN 150 MG/1
150 CAPSULE ORAL TWICE DAILY
Refills: 0 | Status: DISCONTINUED | COMMUNITY
End: 2021-09-20

## 2021-09-20 NOTE — HISTORY OF PRESENT ILLNESS
[FreeTextEntry1] : transaminitis  [de-identified] : Pt comes for follow-up and blood work. \par \par She has had elevated LFT's (ALT>AST). RUQ ultrasound earlier this month showed multiple small gallbladder polyps. \par She continues to have RUQ discomfort. She does not drink alcohol. She is watching her diet. \par She has hypermobility of multiple joints and skin hyperextensibility. She is concerned for Maria G-Danlos syndrome. Her mother has similar symptoms. \par She would like flu shot.

## 2021-09-20 NOTE — PHYSICAL EXAM
[Normal] : normal rate, regular rhythm, normal S1 and S2 and no murmur heard [No Edema] : there was no peripheral edema [Soft] : abdomen soft [Non-distended] : non-distended [Normal Bowel Sounds] : normal bowel sounds [No Rash] : no rash [Normal Mood] : the mood was normal [No Masses] : no abdominal mass palpated [No Hernias] : no hernias [No Joint Swelling] : no joint swelling [Grossly Normal Strength/Tone] : grossly normal strength/tone [de-identified] : friendly [de-identified] : minimal RUQ tenderness  [de-identified] : multiple joint hypermobility

## 2021-09-20 NOTE — PLAN
[FreeTextEntry1] : We will recheck the LFT's today.\par Multiple GB polyps on ultrasound. Given persistent RUQ pain, will send for CT A/P with PO contrast.\par Will refer to medical genetics for evaluation of multiple joint hypermobility, ?Maria G-Danlos Syndrome.\par Flu shot given today.\par

## 2021-09-21 ENCOUNTER — TRANSCRIPTION ENCOUNTER (OUTPATIENT)
Age: 26
End: 2021-09-21

## 2021-09-21 LAB
ALBUMIN SERPL ELPH-MCNC: 4.7 G/DL
ALP BLD-CCNC: 77 U/L
ALT SERPL-CCNC: 21 U/L
AST SERPL-CCNC: 17 U/L
BILIRUB DIRECT SERPL-MCNC: 0.1 MG/DL
BILIRUB INDIRECT SERPL-MCNC: 0.3 MG/DL
BILIRUB SERPL-MCNC: 0.4 MG/DL
PROT SERPL-MCNC: 7.3 G/DL

## 2021-09-27 ENCOUNTER — TRANSCRIPTION ENCOUNTER (OUTPATIENT)
Age: 26
End: 2021-09-27

## 2021-10-11 ENCOUNTER — APPOINTMENT (OUTPATIENT)
Dept: CT IMAGING | Facility: CLINIC | Age: 26
End: 2021-10-11

## 2021-10-19 ENCOUNTER — TRANSCRIPTION ENCOUNTER (OUTPATIENT)
Age: 26
End: 2021-10-19

## 2021-10-20 ENCOUNTER — TRANSCRIPTION ENCOUNTER (OUTPATIENT)
Age: 26
End: 2021-10-20

## 2022-06-21 ENCOUNTER — APPOINTMENT (OUTPATIENT)
Dept: OBGYN | Facility: CLINIC | Age: 27
End: 2022-06-21
Payer: COMMERCIAL

## 2022-06-21 VITALS
HEART RATE: 80 BPM | WEIGHT: 170 LBS | BODY MASS INDEX: 30.12 KG/M2 | SYSTOLIC BLOOD PRESSURE: 125 MMHG | HEIGHT: 63 IN | DIASTOLIC BLOOD PRESSURE: 75 MMHG

## 2022-06-21 DIAGNOSIS — U07.1 COVID-19: ICD-10-CM

## 2022-06-21 PROCEDURE — 99395 PREV VISIT EST AGE 18-39: CPT

## 2022-06-21 PROCEDURE — 81025 URINE PREGNANCY TEST: CPT

## 2022-06-21 NOTE — HISTORY OF PRESENT ILLNESS
[FreeTextEntry1] : Is a 26-year-old  1 para 0 last menstrual period 2022\par Patient presents for annual visit and states that she has a home positive pregnancy test

## 2022-06-21 NOTE — PHYSICAL EXAM
[Chaperone Present] : A chaperone was present in the examining room during all aspects of the physical examination [Appropriately responsive] : appropriately responsive [Alert] : alert [No Acute Distress] : no acute distress [No Lymphadenopathy] : no lymphadenopathy [Regular Rate Rhythm] : regular rate rhythm [No Murmurs] : no murmurs [Clear to Auscultation B/L] : clear to auscultation bilaterally [Soft] : soft [Non-tender] : non-tender [Non-distended] : non-distended [No HSM] : No HSM [No Lesions] : no lesions [No Mass] : no mass [Oriented x3] : oriented x3 [FreeTextEntry6] : No masses, nontender, no skin changes, no nipple discharge, no adenopathy. [Examination Of The Breasts] : a normal appearance [No Masses] : no breast masses were palpable [Labia Majora] : normal [Labia Minora] : normal [Normal] : normal [Tenderness] : nontender [Anteversion] : anteverted [Mass ___ cm] : no uterine mass was palpated [Uterine Adnexae] : normal

## 2022-06-21 NOTE — PLAN
[FreeTextEntry1] : Patient is a 26-year-old  1 para 0 last menstrual period 2022\par Patient presents for a annual visit and states she has had a positive home pregnancy test\par Patient also states she has some breast tenderness and some morning queasiness\par Physical exam reveals a well-developed well-nourished slightly obese female in no apparent distress,,, BMI 30\par Heart regular rhythm and rate, lungs clear, breast no masses nontender no skin change or nipple discharge no adenopathy, abdomen soft nontender no organomegaly\par Pelvic exam shows normal female external genitalia, vagina no lesions, cervix appropriate size nontender, uterus anteverted normal size nontender, adnexa no mass nontender.\par Urine pregnancy test in the office positive\par Pap smear performed\par Patient states she had diagnosis of COVID in 2021 denies any residual symptoms or deficits\par Prescription for prenatal blood work given\par Patient to follow-up for a OB dating sonogram to confirm gestational age and EDC\par Patient states she has prenatal vitamins and folic acid\par

## 2022-06-23 ENCOUNTER — APPOINTMENT (OUTPATIENT)
Dept: OBGYN | Facility: CLINIC | Age: 27
End: 2022-06-23

## 2022-06-23 ENCOUNTER — NON-APPOINTMENT (OUTPATIENT)
Age: 27
End: 2022-06-23

## 2022-06-23 ENCOUNTER — EMERGENCY (EMERGENCY)
Facility: HOSPITAL | Age: 27
LOS: 1 days | Discharge: HOME | End: 2022-06-23
Attending: EMERGENCY MEDICINE
Payer: SELF-PAY

## 2022-06-23 VITALS
OXYGEN SATURATION: 100 % | HEART RATE: 99 BPM | WEIGHT: 173.94 LBS | TEMPERATURE: 99 F | SYSTOLIC BLOOD PRESSURE: 129 MMHG | RESPIRATION RATE: 18 BRPM | HEIGHT: 63 IN | DIASTOLIC BLOOD PRESSURE: 78 MMHG

## 2022-06-23 DIAGNOSIS — Z98.890 OTHER SPECIFIED POSTPROCEDURAL STATES: Chronic | ICD-10-CM

## 2022-06-23 LAB
ALBUMIN SERPL ELPH-MCNC: 4 G/DL — SIGNIFICANT CHANGE UP (ref 3.3–5.2)
ALP SERPL-CCNC: 59 U/L — SIGNIFICANT CHANGE UP (ref 40–120)
ALT FLD-CCNC: 12 U/L — SIGNIFICANT CHANGE UP
ANION GAP SERPL CALC-SCNC: 10 MMOL/L — SIGNIFICANT CHANGE UP (ref 5–17)
APPEARANCE UR: CLEAR — SIGNIFICANT CHANGE UP
AST SERPL-CCNC: 13 U/L — SIGNIFICANT CHANGE UP
BACTERIA # UR AUTO: ABNORMAL
BASOPHILS # BLD AUTO: 0.05 K/UL — SIGNIFICANT CHANGE UP (ref 0–0.2)
BASOPHILS NFR BLD AUTO: 0.4 % — SIGNIFICANT CHANGE UP (ref 0–2)
BILIRUB SERPL-MCNC: <0.2 MG/DL — LOW (ref 0.4–2)
BILIRUB UR-MCNC: NEGATIVE — SIGNIFICANT CHANGE UP
BUN SERPL-MCNC: 9 MG/DL — SIGNIFICANT CHANGE UP (ref 8–20)
C TRACH RRNA SPEC QL NAA+PROBE: NOT DETECTED
CALCIUM SERPL-MCNC: 9.1 MG/DL — SIGNIFICANT CHANGE UP (ref 8.6–10.2)
CHLORIDE SERPL-SCNC: 103 MMOL/L — SIGNIFICANT CHANGE UP (ref 98–107)
CO2 SERPL-SCNC: 23 MMOL/L — SIGNIFICANT CHANGE UP (ref 22–29)
COLOR SPEC: YELLOW — SIGNIFICANT CHANGE UP
CREAT SERPL-MCNC: 0.59 MG/DL — SIGNIFICANT CHANGE UP (ref 0.5–1.3)
DIFF PNL FLD: NEGATIVE — SIGNIFICANT CHANGE UP
EGFR: 127 ML/MIN/1.73M2 — SIGNIFICANT CHANGE UP
EOSINOPHIL # BLD AUTO: 0.04 K/UL — SIGNIFICANT CHANGE UP (ref 0–0.5)
EOSINOPHIL NFR BLD AUTO: 0.3 % — SIGNIFICANT CHANGE UP (ref 0–6)
EPI CELLS # UR: SIGNIFICANT CHANGE UP
GLUCOSE SERPL-MCNC: 110 MG/DL — HIGH (ref 70–99)
GLUCOSE UR QL: NEGATIVE MG/DL — SIGNIFICANT CHANGE UP
HCG SERPL-ACNC: HIGH MIU/ML
HCT VFR BLD CALC: 35.1 % — SIGNIFICANT CHANGE UP (ref 34.5–45)
HGB BLD-MCNC: 12 G/DL — SIGNIFICANT CHANGE UP (ref 11.5–15.5)
IMM GRANULOCYTES NFR BLD AUTO: 0.3 % — SIGNIFICANT CHANGE UP (ref 0–1.5)
KETONES UR-MCNC: ABNORMAL
LEUKOCYTE ESTERASE UR-ACNC: ABNORMAL
LYMPHOCYTES # BLD AUTO: 2.81 K/UL — SIGNIFICANT CHANGE UP (ref 1–3.3)
LYMPHOCYTES # BLD AUTO: 20.4 % — SIGNIFICANT CHANGE UP (ref 13–44)
MCHC RBC-ENTMCNC: 31 PG — SIGNIFICANT CHANGE UP (ref 27–34)
MCHC RBC-ENTMCNC: 34.2 GM/DL — SIGNIFICANT CHANGE UP (ref 32–36)
MCV RBC AUTO: 90.7 FL — SIGNIFICANT CHANGE UP (ref 80–100)
MONOCYTES # BLD AUTO: 0.79 K/UL — SIGNIFICANT CHANGE UP (ref 0–0.9)
MONOCYTES NFR BLD AUTO: 5.7 % — SIGNIFICANT CHANGE UP (ref 2–14)
N GONORRHOEA RRNA SPEC QL NAA+PROBE: NOT DETECTED
NEUTROPHILS # BLD AUTO: 10.04 K/UL — HIGH (ref 1.8–7.4)
NEUTROPHILS NFR BLD AUTO: 72.9 % — SIGNIFICANT CHANGE UP (ref 43–77)
NITRITE UR-MCNC: NEGATIVE — SIGNIFICANT CHANGE UP
PH UR: 7 — SIGNIFICANT CHANGE UP (ref 5–8)
PLATELET # BLD AUTO: 249 K/UL — SIGNIFICANT CHANGE UP (ref 150–400)
POTASSIUM SERPL-MCNC: 3.8 MMOL/L — SIGNIFICANT CHANGE UP (ref 3.5–5.3)
POTASSIUM SERPL-SCNC: 3.8 MMOL/L — SIGNIFICANT CHANGE UP (ref 3.5–5.3)
PROT SERPL-MCNC: 7 G/DL — SIGNIFICANT CHANGE UP (ref 6.6–8.7)
PROT UR-MCNC: NEGATIVE — SIGNIFICANT CHANGE UP
RBC # BLD: 3.87 M/UL — SIGNIFICANT CHANGE UP (ref 3.8–5.2)
RBC # FLD: 11.9 % — SIGNIFICANT CHANGE UP (ref 10.3–14.5)
RBC CASTS # UR COMP ASSIST: SIGNIFICANT CHANGE UP /HPF (ref 0–4)
SODIUM SERPL-SCNC: 136 MMOL/L — SIGNIFICANT CHANGE UP (ref 135–145)
SOURCE TP AMPLIFICATION: NORMAL
SP GR SPEC: 1 — LOW (ref 1.01–1.02)
TROPONIN T SERPL-MCNC: <0.01 NG/ML — SIGNIFICANT CHANGE UP (ref 0–0.06)
UROBILINOGEN FLD QL: NEGATIVE MG/DL — SIGNIFICANT CHANGE UP
WBC # BLD: 13.77 K/UL — HIGH (ref 3.8–10.5)
WBC # FLD AUTO: 13.77 K/UL — HIGH (ref 3.8–10.5)
WBC UR QL: ABNORMAL /HPF (ref 0–5)

## 2022-06-23 PROCEDURE — 84484 ASSAY OF TROPONIN QUANT: CPT

## 2022-06-23 PROCEDURE — 99285 EMERGENCY DEPT VISIT HI MDM: CPT | Mod: 25

## 2022-06-23 PROCEDURE — 99285 EMERGENCY DEPT VISIT HI MDM: CPT

## 2022-06-23 PROCEDURE — 85025 COMPLETE CBC W/AUTO DIFF WBC: CPT

## 2022-06-23 PROCEDURE — 36415 COLL VENOUS BLD VENIPUNCTURE: CPT

## 2022-06-23 PROCEDURE — 93010 ELECTROCARDIOGRAM REPORT: CPT

## 2022-06-23 PROCEDURE — 84702 CHORIONIC GONADOTROPIN TEST: CPT

## 2022-06-23 PROCEDURE — 76817 TRANSVAGINAL US OBSTETRIC: CPT

## 2022-06-23 PROCEDURE — 81001 URINALYSIS AUTO W/SCOPE: CPT

## 2022-06-23 PROCEDURE — 93005 ELECTROCARDIOGRAM TRACING: CPT

## 2022-06-23 PROCEDURE — 76801 OB US < 14 WKS SINGLE FETUS: CPT

## 2022-06-23 PROCEDURE — 76817 TRANSVAGINAL US OBSTETRIC: CPT | Mod: 26

## 2022-06-23 PROCEDURE — 80053 COMPREHEN METABOLIC PANEL: CPT

## 2022-06-23 PROCEDURE — 76801 OB US < 14 WKS SINGLE FETUS: CPT | Mod: 26

## 2022-06-23 RX ORDER — SODIUM CHLORIDE 9 MG/ML
1000 INJECTION, SOLUTION INTRAVENOUS ONCE
Refills: 0 | Status: COMPLETED | OUTPATIENT
Start: 2022-06-23 | End: 2022-06-23

## 2022-06-23 RX ORDER — ONDANSETRON 8 MG/1
1 TABLET, FILM COATED ORAL
Qty: 20 | Refills: 0
Start: 2022-06-23

## 2022-06-23 RX ORDER — ONDANSETRON 8 MG/1
4 TABLET, FILM COATED ORAL ONCE
Refills: 0 | Status: DISCONTINUED | OUTPATIENT
Start: 2022-06-23 | End: 2022-06-28

## 2022-06-23 RX ADMIN — SODIUM CHLORIDE 1000 MILLILITER(S): 9 INJECTION, SOLUTION INTRAVENOUS at 17:22

## 2022-06-23 RX ADMIN — SODIUM CHLORIDE 1000 MILLILITER(S): 9 INJECTION, SOLUTION INTRAVENOUS at 17:12

## 2022-06-23 NOTE — ED PROVIDER NOTE - CLINICAL SUMMARY MEDICAL DECISION MAKING FREE TEXT BOX
Pregnant patient with 2 episodes of vomiting and syncope from home. Patient has not had a formal ultrasound as of yet. Will evaluate with labwork and formal ultrasound.

## 2022-06-23 NOTE — ED ADULT TRIAGE NOTE - CHIEF COMPLAINT QUOTE
11 weeks gestation; reports syncopal episode yesterday & this AM s/p  N/V both times. pt states she woke up on floor both times with unk LOC. denies vag bleed.

## 2022-06-23 NOTE — ED PROVIDER NOTE - NSICDXPASTMEDICALHX_GEN_ALL_CORE_FT
PAST MEDICAL HISTORY:  Atrial fibrillation     H/O hypercholesterolemia     Hx of viral meningitis     Hypothyroid

## 2022-06-23 NOTE — ED PROVIDER NOTE - NSFOLLOWUPINSTRUCTIONS_ED_ALL_ED_FT
You may take ondansetron every 6-8 hours as needed for nausea  Follow up with your OBGYN  Return to the ER with any new, worsening or persistent symptoms

## 2022-06-23 NOTE — ED PROVIDER NOTE - OBJECTIVE STATEMENT
Patient is a 25yo F presenting with syncope from home. She states that she is approx 11 weeks pregnant with her first pregnancy. Patient reports that last night she felt nauseous and dry heaved and passed out. Today she felt better, ate breakfast, tried to rehydrate and went to work. There she again had an episode of nausea and vomited and syncopized. She states that she has never felt this way before. She reports that her symptoms are intermittent. Patient notes a history of cardiac ablation for SVT several years ago. Patient denies fevers, chills, chest pain, sob, vaginal bleeding, abdominal pain, dysuria.

## 2022-06-23 NOTE — ED PROVIDER NOTE - PATIENT PORTAL LINK FT
You can access the FollowMyHealth Patient Portal offered by [facility] by registering at the following website: [email or web address]. By joining [employer]’s FollowMyHealth portal, you will also be able to view your health information using other applications (apps) compatible with our system.

## 2022-06-23 NOTE — ED PROVIDER NOTE - PHYSICAL EXAMINATION
Gen: Well appearing in NAD  Head: NC/AT  Neck: Trachea midline  Resp: No distress  Abd: Gravid nontender abdomen  Cardiac: Well perfused  Skin: Warm and dry as visualized  Psych: Normal affect and mood Gen: Well appearing in NAD  Head: NC/AT  Neck: Trachea midline  Resp: No distress  Abd: nontender abdomen  Cardiac: Well perfused  Skin: Warm and dry as visualized  Psych: Normal affect and mood

## 2022-06-23 NOTE — ED PROVIDER NOTE - NS ED ROS FT
General: Denies fever, chills  HEENT: Denies visual changes, sore throat  Neck: Denies neck pain, neck stiffness  Resp: Denies coughing, SOB  Cardiovascular: Denies CP, palpitations  GI: + nausea, vomiting. Denies abdominal pain, diarrhea  : Denies dysuria, hematuria  MSK: Denies back pain  Neuro: Denies HA, dizziness  Skin: Denies rashes

## 2022-06-23 NOTE — ED ADULT NURSE REASSESSMENT NOTE - NS ED NURSE REASSESS COMMENT FT1
Patient is alert and oriented X4 from he. PAtient is resting comfortably at this time. Patient returned from [facility] and is aware she is waiting for results at this time. Patient with no complaints.

## 2022-06-23 NOTE — ED PROVIDER NOTE - ATTENDING CONTRIBUTION TO CARE
I personally saw the patient with the resident, and completed the key components of the history and physical exam. I then discussed the management plan with the resident.    25 y/o  LMP 22 presents for two syncopal episodes while vomiting, once last night and once today at work. Denies chest pain, SOB, dizziness. Denies vaginal bleeding or cramping, this is a naturally conceived pregnancy. She is scheduled for her first sono tomorrow with her OB.    I agree with exam as documented.    bedside sono shows early IUPs - will obtain formal sono, labs, fluids, zofran for nausea, reassess.

## 2022-06-23 NOTE — ED ADULT NURSE NOTE - OBJECTIVE STATEMENT
pt stated she is 10wks pregnant and has had morning sickness/nausea, today she felt nauseous and sycopized and hit the left side of her head.  she denied pain at this time vss she stated she has discomfort in her upper chest/throat.

## 2022-06-23 NOTE — ED PROVIDER NOTE - PROGRESS NOTE DETAILS
Bernard: US with unremarkable 7wk IUP. Symptoms well under control, will dc with ODT ondansetron, has OBGYN follow up arranged

## 2022-06-24 ENCOUNTER — NON-APPOINTMENT (OUTPATIENT)
Age: 27
End: 2022-06-24

## 2022-06-24 ENCOUNTER — ASOB RESULT (OUTPATIENT)
Age: 27
End: 2022-06-24

## 2022-06-24 ENCOUNTER — APPOINTMENT (OUTPATIENT)
Dept: OBGYN | Facility: CLINIC | Age: 27
End: 2022-06-24
Payer: COMMERCIAL

## 2022-06-24 VITALS
HEIGHT: 63 IN | BODY MASS INDEX: 30.48 KG/M2 | SYSTOLIC BLOOD PRESSURE: 130 MMHG | HEART RATE: 71 BPM | WEIGHT: 172 LBS | DIASTOLIC BLOOD PRESSURE: 85 MMHG

## 2022-06-24 DIAGNOSIS — Z86.79 PERSONAL HISTORY OF OTHER DISEASES OF THE CIRCULATORY SYSTEM: ICD-10-CM

## 2022-06-24 DIAGNOSIS — R00.2 PALPITATIONS: ICD-10-CM

## 2022-06-24 DIAGNOSIS — R55 SYNCOPE AND COLLAPSE: ICD-10-CM

## 2022-06-24 PROCEDURE — 76817 TRANSVAGINAL US OBSTETRIC: CPT

## 2022-06-24 PROCEDURE — 0500F INITIAL PRENATAL CARE VISIT: CPT

## 2022-06-24 PROCEDURE — 76801 OB US < 14 WKS SINGLE FETUS: CPT

## 2022-06-24 RX ORDER — FOLIC ACID 1 MG/1
1 TABLET ORAL DAILY
Qty: 30 | Refills: 4 | Status: ACTIVE | COMMUNITY
Start: 2022-06-24

## 2022-06-24 NOTE — PLAN
[FreeTextEntry1] : Patient is a 26-year-old  1 para 0 last menstrual period 2022\par Patient was seen initially on 2022 and presents today for a dating sonogram\par Sonogram today shows a intrauterine pregnancy with heartbeat at 7 weeks 1 day making in due date of 2023\par Results discussed with patient and her \par Chart initiated as a new OB patient and patient has the prescriptions to proceed with the prenatal lab work\par Follow-up 4 weeks\par Genetic testing discussed and offered

## 2022-06-24 NOTE — HISTORY OF PRESENT ILLNESS
[FreeTextEntry1] : Patient is a 26-year-old  1 para 0 last menstrual period 2022\par Patient was seen initially on 2022,,, presents today for dating sonogram

## 2022-07-08 ENCOUNTER — APPOINTMENT (OUTPATIENT)
Dept: OBGYN | Facility: CLINIC | Age: 27
End: 2022-07-08

## 2022-07-08 VITALS
HEART RATE: 84 BPM | WEIGHT: 170 LBS | HEIGHT: 63 IN | SYSTOLIC BLOOD PRESSURE: 125 MMHG | BODY MASS INDEX: 30.12 KG/M2 | DIASTOLIC BLOOD PRESSURE: 80 MMHG

## 2022-07-08 PROCEDURE — 0502F SUBSEQUENT PRENATAL CARE: CPT

## 2022-07-09 LAB
BILIRUB UR QL STRIP: NEGATIVE
CLARITY UR: NORMAL
COLLECTION METHOD: NORMAL
GLUCOSE UR-MCNC: NEGATIVE
HCG UR QL: 0.2 EU/DL
HGB UR QL STRIP.AUTO: NEGATIVE
KETONES UR-MCNC: NEGATIVE
LEUKOCYTE ESTERASE UR QL STRIP: NORMAL
NITRITE UR QL STRIP: NEGATIVE
PH UR STRIP: 5.5
PROT UR STRIP-MCNC: NEGATIVE
SP GR UR STRIP: 1.02

## 2022-07-11 ENCOUNTER — APPOINTMENT (OUTPATIENT)
Dept: OBGYN | Facility: CLINIC | Age: 27
End: 2022-07-11

## 2022-07-20 ENCOUNTER — NON-APPOINTMENT (OUTPATIENT)
Age: 27
End: 2022-07-20

## 2022-07-20 LAB
ABO + RH PNL BLD: NORMAL
BASOPHILS # BLD AUTO: 0.03 K/UL
BASOPHILS NFR BLD AUTO: 0.3 %
BLD GP AB SCN SERPL QL: NORMAL
EOSINOPHIL # BLD AUTO: 0.15 K/UL
EOSINOPHIL NFR BLD AUTO: 1.6 %
HBV SURFACE AG SER QL: NONREACTIVE
HCT VFR BLD CALC: 38.5 %
HGB BLD-MCNC: 12 G/DL
HIV1+2 AB SPEC QL IA.RAPID: NONREACTIVE
IMM GRANULOCYTES NFR BLD AUTO: 0.2 %
LYMPHOCYTES # BLD AUTO: 2.31 K/UL
LYMPHOCYTES NFR BLD AUTO: 24.7 %
MAN DIFF?: NORMAL
MCHC RBC-ENTMCNC: 29.9 PG
MCHC RBC-ENTMCNC: 31.2 GM/DL
MCV RBC AUTO: 96 FL
MONOCYTES # BLD AUTO: 0.56 K/UL
MONOCYTES NFR BLD AUTO: 6 %
NEUTROPHILS # BLD AUTO: 6.28 K/UL
NEUTROPHILS NFR BLD AUTO: 67.2 %
PLATELET # BLD AUTO: 255 K/UL
RBC # BLD: 4.01 M/UL
RBC # FLD: 12.7 %
TSH SERPL-ACNC: 2.28 UIU/ML
WBC # FLD AUTO: 9.35 K/UL

## 2022-07-21 LAB
CMV IGG SERPL QL: <0.2 U/ML
CMV IGG SERPL-IMP: NEGATIVE
CMV IGM SERPL QL: <8 AU/ML
CMV IGM SERPL QL: NEGATIVE
MEV IGG FLD QL IA: >300 AU/ML
MEV IGG+IGM SER-IMP: POSITIVE
RUBV IGG FLD-ACNC: 3 INDEX
RUBV IGG SER-IMP: POSITIVE
T GONDII AB SER-IMP: NEGATIVE
T GONDII IGM SER QL: <3 AU/ML

## 2022-07-22 ENCOUNTER — APPOINTMENT (OUTPATIENT)
Dept: MATERNAL FETAL MEDICINE | Facility: CLINIC | Age: 27
End: 2022-07-22

## 2022-07-22 ENCOUNTER — ASOB RESULT (OUTPATIENT)
Age: 27
End: 2022-07-22

## 2022-07-22 LAB
AR GENE MUT ANL BLD/T: NORMAL
B19V IGG SER QL IA: 8.34 INDEX
B19V IGG+IGM SER-IMP: NORMAL
B19V IGG+IGM SER-IMP: POSITIVE
B19V IGM FLD-ACNC: 0.11 INDEX
B19V IGM SER-ACNC: NEGATIVE
HGB A MFR BLD: 97.5 %
HGB A2 MFR BLD: 2.5 %
HGB FRACT BLD-IMP: NORMAL
T PALLIDUM AB SER QL IA: NEGATIVE

## 2022-07-22 PROCEDURE — 99202 OFFICE O/P NEW SF 15 MIN: CPT | Mod: 95

## 2022-07-25 LAB — FMR1 GENE MUT ANL BLD/T: NORMAL

## 2022-07-30 LAB — CFTR MUT TESTED BLD/T: NEGATIVE

## 2022-08-03 ENCOUNTER — APPOINTMENT (OUTPATIENT)
Dept: ANTEPARTUM | Facility: CLINIC | Age: 27
End: 2022-08-03

## 2022-08-03 ENCOUNTER — NON-APPOINTMENT (OUTPATIENT)
Age: 27
End: 2022-08-03

## 2022-08-03 ENCOUNTER — ASOB RESULT (OUTPATIENT)
Age: 27
End: 2022-08-03

## 2022-08-03 PROCEDURE — 36415 COLL VENOUS BLD VENIPUNCTURE: CPT

## 2022-08-03 PROCEDURE — 76813 OB US NUCHAL MEAS 1 GEST: CPT

## 2022-08-08 ENCOUNTER — NON-APPOINTMENT (OUTPATIENT)
Age: 27
End: 2022-08-08

## 2022-08-08 ENCOUNTER — APPOINTMENT (OUTPATIENT)
Dept: OBGYN | Facility: CLINIC | Age: 27
End: 2022-08-08

## 2022-08-08 VITALS
SYSTOLIC BLOOD PRESSURE: 125 MMHG | HEIGHT: 63 IN | DIASTOLIC BLOOD PRESSURE: 80 MMHG | BODY MASS INDEX: 30.3 KG/M2 | WEIGHT: 171 LBS | HEART RATE: 105 BPM

## 2022-08-08 PROCEDURE — 0502F SUBSEQUENT PRENATAL CARE: CPT

## 2022-08-12 ENCOUNTER — NON-APPOINTMENT (OUTPATIENT)
Age: 27
End: 2022-08-12

## 2022-08-19 LAB — AFP PNL SERPL: NORMAL

## 2022-08-29 ENCOUNTER — NON-APPOINTMENT (OUTPATIENT)
Age: 27
End: 2022-08-29

## 2022-08-29 ENCOUNTER — APPOINTMENT (OUTPATIENT)
Dept: ANTEPARTUM | Facility: CLINIC | Age: 27
End: 2022-08-29

## 2022-08-29 ENCOUNTER — APPOINTMENT (OUTPATIENT)
Dept: ORTHOPEDIC SURGERY | Facility: CLINIC | Age: 27
End: 2022-08-29

## 2022-08-29 VITALS — BODY MASS INDEX: 30.12 KG/M2 | WEIGHT: 170 LBS | HEIGHT: 63 IN

## 2022-08-29 PROCEDURE — 99072 ADDL SUPL MATRL&STAF TM PHE: CPT

## 2022-08-29 PROCEDURE — 36415 COLL VENOUS BLD VENIPUNCTURE: CPT

## 2022-08-29 PROCEDURE — 99203 OFFICE O/P NEW LOW 30 MIN: CPT

## 2022-08-29 NOTE — ASSESSMENT
[FreeTextEntry1] : ANTERIOR SOFT TISSUE CONTUSION AND EXACERBATION OF UNDERLYING CHONDROMALACIA AFTER WORK INJURY\par NO RED FLAGS\par BRACE FOR SUPPORT\par ICE AND ELEVATION\par OK TO RESUME WORK NEXT WEEK, AVOID KNEELING AND STAIRS\par CURRENTLY PREGNANT, WOULD AVOID ANY ORAL MEDICATIONS AT THIS TIME\par F/U 6 WEEKS

## 2022-08-29 NOTE — IMAGING
[de-identified] : Healing anterior abrasion\par No effusion, no warmth\par Anterior tenderness to palpation\par Range of motion 0-120; anterior pain with flexion; tight hamstrings\par 5/5 quadriceps and hamstring strength\par Negative Lachman, negative Emily, negative patella apprehension\par Motor and sensory intact distally\par Mildly antalgic gait\par

## 2022-08-29 NOTE — WORK
[Can return to work without limitations on ______] : can return to work without limitations on [unfilled] [No Rx restrictions] : No Rx restrictions. [I provided the services listed above] :  I provided the services listed above.

## 2022-08-29 NOTE — HISTORY OF PRESENT ILLNESS
[Work related] : work related [Sudden] : sudden [7] : 7 [6] : 6 [Localized] : localized [Intermittent] : intermittent [Household chores] : household chores [Work] : work [Nothing helps with pain getting better] : Nothing helps with pain getting better [Walking] : walking [Light duty] : Work status: light duty [de-identified] : PT PRESENTS HERE TODAY WITH LEFT KNEE PAIN AFTER GOING BACK TO A BOAT AND FOOT GOT CAUGH AND FELT AND SMASH THE KNEE AT WORK  [] : no [FreeTextEntry1] : LEFT KNEE  [FreeTextEntry3] : 08/17/2022 [FreeTextEntry5] : WORK INJURY; LEFT KNEE HIT STEEL DECK AFTER FALL WHILE ON A BOAT; NO PRIOR ISSUES. LIMPED OFF AND PAIN PERSISTS SINCE. CURRENTLY PREGNANT [de-identified] : MRI DONE AT [facility]  [de-identified] : NONE

## 2022-09-06 ENCOUNTER — APPOINTMENT (OUTPATIENT)
Dept: OBGYN | Facility: CLINIC | Age: 27
End: 2022-09-06

## 2022-09-06 VITALS
WEIGHT: 172 LBS | SYSTOLIC BLOOD PRESSURE: 131 MMHG | BODY MASS INDEX: 30.47 KG/M2 | DIASTOLIC BLOOD PRESSURE: 79 MMHG | HEART RATE: 86 BPM

## 2022-09-06 PROCEDURE — 0502F SUBSEQUENT PRENATAL CARE: CPT

## 2022-09-09 LAB — AFP PNL SERPL: NORMAL

## 2022-09-14 ENCOUNTER — EMERGENCY (EMERGENCY)
Facility: HOSPITAL | Age: 27
LOS: 1 days | Discharge: HOME | End: 2022-09-14
Attending: EMERGENCY MEDICINE | Admitting: EMERGENCY MEDICINE
Payer: COMMERCIAL

## 2022-09-14 VITALS — HEART RATE: 94 BPM | DIASTOLIC BLOOD PRESSURE: 76 MMHG | SYSTOLIC BLOOD PRESSURE: 133 MMHG

## 2022-09-14 VITALS
HEART RATE: 113 BPM | WEIGHT: 173.06 LBS | OXYGEN SATURATION: 100 % | DIASTOLIC BLOOD PRESSURE: 78 MMHG | SYSTOLIC BLOOD PRESSURE: 142 MMHG | RESPIRATION RATE: 18 BRPM | TEMPERATURE: 98 F | HEIGHT: 63 IN

## 2022-09-14 DIAGNOSIS — Z98.890 OTHER SPECIFIED POSTPROCEDURAL STATES: Chronic | ICD-10-CM

## 2022-09-14 PROCEDURE — 99284 EMERGENCY DEPT VISIT MOD MDM: CPT | Mod: 25

## 2022-09-14 PROCEDURE — 73610 X-RAY EXAM OF ANKLE: CPT

## 2022-09-14 PROCEDURE — 99283 EMERGENCY DEPT VISIT LOW MDM: CPT

## 2022-09-14 PROCEDURE — 73630 X-RAY EXAM OF FOOT: CPT | Mod: 26,LT

## 2022-09-14 PROCEDURE — 73610 X-RAY EXAM OF ANKLE: CPT | Mod: 26,LT

## 2022-09-14 PROCEDURE — 73630 X-RAY EXAM OF FOOT: CPT

## 2022-09-14 NOTE — ED PROVIDER NOTE - PATIENT PORTAL LINK FT
You can access the FollowMyHealth Patient Portal offered by [facility] by registering at the following website: [email or web address]. By joining [employer]’s FollowMyHealth portal, you will also be able to view your health information using other applications (apps) compatible with our system.

## 2022-09-14 NOTE — ED PROVIDER NOTE - PHYSICAL EXAMINATION
L leg: pos tend to L dorsal / lat foot and mild tend ant to L lat mall. nl med / post mall. nl dist foot. nl prox tibfib / knee. 2+ pulses. nl cap refill.

## 2022-09-14 NOTE — ED PROVIDER NOTE - OBJECTIVE STATEMENT
26-year-old female with history left ankle fracture status post surgical repair last year,  at 18 weeks and 6/7 presents with mechanical fall today.  Patient was walking down steps when she lost her balance and when she stepped on her left leg she inverted the left ankle and foot.  Patient did not fall to the ground.  No abdominal trauma.  No head injury.  No neck or back pain.  No numbness/tingling/focal weakness.  Patient complains of pain to left ankle, worse with bearing weight.  No other acute injury or complaints.  Patient is fully vaccinated for COVID.

## 2022-09-14 NOTE — ED PROVIDER NOTE - PROGRESS NOTE DETAILS
Discussed with Patient and family regarding the X-ray findings.  Discussed the risks of occult / secondary fracture or ligamentous/tendon injury especially given pts hx of ligamentous inj / surg. Discussed the importance of close prompt follow-up with Orthopaedics for definitive workup and treatment.  Also discussed injury / neuro precautions.  Verbalization of understanding of all instructions was shown and an opportunity was given for questions.

## 2022-09-14 NOTE — ED PROVIDER NOTE - NSFOLLOWUPINSTRUCTIONS_ED_ALL_ED_FT
1) Follow-up with your Orthopedist, Dr Jakub Stiles, See referred doctor. Call tomorrow for close, prompt follow-up.  2) Return to Emergency room for any worsening or persistent pain, weakness, numbness, fever, color change to extremity, or any other concerning symptoms.  3) See attached instruction sheets for additional information, including information regarding signs and symptoms to look out for, reasons to seek immediate care and other important instructions.  4) Rest, Ice, Elevate injured area  5) Wear splint as discussed, use crutches

## 2022-09-14 NOTE — ED PROVIDER NOTE - NS ED MD DISPO DISCHARGE CCDA
Seems like Radha Cadena has trouble hearing at times.   Brand new glasses      Radha Huerta is 15 year old 1 month old, here for a preventive care visit.    Assessment & Plan     Radha Cadena was seen today for well child.    Diagnoses and all orders for this visit:    First portion of today's visit was done with presence of mom and then time alone for a confidential conversation.    Encounter for routine child health examination w/o abnormal findings  -     BEHAVIORAL/EMOTIONAL ASSESSMENT (59550)  -     SCREENING TEST, PURE TONE, AIR ONLY  -     SCREENING, VISUAL ACUITY, QUANTITATIVE, BILAT  -     Hemoglobin; Future  -     Hemoglobin    Current severe episode of major depressive disorder without psychotic features without prior episode (H)  Gender dysphoria in pediatric patient  Relationship problem with parent  Patient's mother indicates notable improvements with the dose increase in Prozac to 40 mg.  At this time Radha Cadena's mom feels this is a good dose and does not need to be increased any further.  Radha Cadena feels comfortable with this plan as well.  I did encourage the 2 of them to really consider counseling and the gender dysphoria clinics that we have previously reviewed. Scores high on PHQ9 and GAD7; with passive SI and today expressing desire to cut themself with  but denies active intent to take own life.  - continue prozac 40mg  - start counseling (has referrals/numbers to call)    Non-seasonal allergic rhinitis due to fungal spores  Flonase as needed    Exercise-induced asthma  Vocal cord dysfunction  Not using albuterol inhaler pre exercise and ACT score is 10; reviewed ways to use albuterol and also advised starting maintence inhaler as was prescribed by Dr. Anguiano before  - Recommended follow up in another month to reassess  -     fluticasone (FLOVENT HFA) 110 MCG/ACT inhaler; Inhale 1 puff into the lungs 2 times daily        Growth        Normal height and weight    No weight  concerns.    Immunizations     Vaccines up to date.      Anticipatory Guidance    Reviewed age appropriate anticipatory guidance.       Referrals/Ongoing Specialty Care  Referrals made previously for therapy, see above    Follow Up      Return in 1 year (on 10/29/2022) for Preventive Care visit.    Patient has been advised of split billing requirements and indicates understanding: Yes      Subjective     Pt Wondering about ADHD; mom denies this as a concern.  Sometimes states has boredom with classes and looses focuses. Overall doing pretty well in school though.   Does really like math and biology is harder  Enjoys most their classes      History of exercise-induced asthma but seems to be a little bit worse lately when walking to class outside in particular with the colder weather.  Not sure which albuterol inhaler to use.  Was previously given a prescription for Flovent but has not been using.    Additional Questions 10/29/2021   Do you have any questions today that you would like to discuss? No   Has your child had a surgery, major illness or injury since the last physical exam? No       Social 10/29/2021   Who does your adolescent live with? Parent(s), Sibling(s)   Has your adolescent experienced any stressful family events recently? None   In the past 12 months, has lack of transportation kept you from medical appointments or from getting medications? No   In the last 12 months, was there a time when you were not able to pay the mortgage or rent on time? No   In the last 12 months, was there a time when you did not have a steady place to sleep or slept in a shelter (including now)? No       Health Risks/Safety 10/29/2021   Does your adolescent always wear a seat belt? Yes   Does your adolescent wear a helmet for bicycle, rollerblades, skateboard, scooter, skiing/snowboarding, ATV/snowmobile? (!) NO          TB Screening 10/29/2021   Since your last Well Child visit, has your adolescent or any of their family  members or close contacts had tuberculosis or a positive tuberculosis test? No   Since your last Well Child Visit, has your adolescent or any of their family members or close contacts traveled or lived outside of the [place]? No   Since your last Well Child visit, has your adolescent lived in a high-risk group setting like a correctional facility, health care facility, homeless shelter, or refugee camp?  No       Dyslipidemia Screening 10/29/2021   Have any of the child's parents or grandparents had a stroke or heart attack before age 55 for males or before age 65 for females?  (!) YES   Do either of the child's parents have high cholesterol or are currently taking medications to treat cholesterol? No    Risk Factors: Family history of early cardiac disease (<55 years old in males or  <65 years old in females)      Dental Screening 10/29/2021   Has your adolescent seen a dentist? Yes   When was the last visit? Within the last 3 months   Has your adolescent had cavities in the last 3 years? No   Has your adolescent s parent(s), caregiver, or sibling(s) had any cavities in the last 2 years?  No     Dental Fluoride Varnish:   No, sees dentist.    Vision Screen  Vision Screen Details  Reason Vision Screen Not Completed: Patient has seen eye doctor in the past 12 months    Hearing Screen  RIGHT EAR  1000 Hz on Level 40 dB (Conditioning sound): Pass  1000 Hz on Level 20 dB: Pass  2000 Hz on Level 20 dB: Pass  4000 Hz on Level 20 dB: Pass  6000 Hz on Level 20 dB: Pass  8000 Hz on Level 20 dB: Pass  LEFT EAR  8000 Hz on Level 20 dB: Pass  6000 Hz on Level 20 dB: Pass  4000 Hz on Level 20 dB: Pass  2000 Hz on Level 20 dB: Pass  1000 Hz on Level 20 dB: Pass  500 Hz on Level 25 dB: Pass  RIGHT EAR  500 Hz on Level 25 dB: Pass  Results  Hearing Screen Results: Pass      Psycho-Social/Depression  General screening:  PSC-17 PASS (<15 pass), no followup necessary  Teen Screen  Teen Screen completed today and document  "scanned.  Any associated documentation is confidential and protected under [place].   144343(1); 144.3051; 144.933.         Objective     Exam  BP 92/58 (BP Location: Left arm, Patient Position: Sitting, Cuff Size: Adult Small)   Pulse 68   Ht 1.683 m (5' 6.25\")   Wt 55.2 kg (121 lb 12.8 oz)   BMI 19.51 kg/m    83 %ile (Z= 0.97) based on CDC (Girls, 2-20 Years) Stature-for-age data based on Stature recorded on 10/29/2021.  62 %ile (Z= 0.30) based on [facility] (Girls, 2-20 Years) weight-for-age data using vitals from 10/29/2021.  44 %ile (Z= -0.16) based on [facility] (Girls, 2-20 Years) BMI-for-age based on BMI available as of 10/29/2021.  Blood pressure percentiles are 4 % systolic and 19 % diastolic based on the 2017 AAP Clinical Practice Guideline. This reading is in the normal blood pressure range.  Physical Exam  GENERAL: Active, alert, in no acute distress.  SKIN: Clear. No significant rash, abnormal pigmentation or lesions  HEAD: Normocephalic  EYES: Pupils equal, round, reactive, Extraocular muscles intact. Normal conjunctivae.  EARS: Normal canals. Tympanic membranes are normal; gray and translucent.  NOSE: Normal without discharge.  MOUTH/THROAT: Clear. No oral lesions. Teeth without obvious abnormalities.  NECK: Supple, no masses.  No thyromegaly.  LYMPH NODES: No adenopathy  LUNGS: Clear. No rales, rhonchi, wheezing or retractions  HEART: Regular rhythm. Normal S1/S2. No murmurs. Normal pulses.  ABDOMEN: Soft, non-tender, not distended, no masses or hepatosplenomegaly. Bowel sounds normal.   NEUROLOGIC: No focal findings. Cranial nerves grossly intact: DTR's normal. Normal gait, strength and tone  BACK: Spine is straight, no scoliosis.  EXTREMITIES: Full range of motion, no deformities        Sandie Ramires MD  [facility]" Patient/Caregiver provided printed discharge information.

## 2022-09-14 NOTE — ED PROVIDER NOTE - CARE PROVIDER_API CALL
Alonso Marroquin)  [facility]  [address] [place] [address]  Phone: [phone]  Fax: [phone]  Follow Up Time:

## 2022-09-14 NOTE — ED PROVIDER NOTE - GASTROINTESTINAL, MLM
Abdomen soft, non-tender, no guarding. Gravid abd with no acute tenderness. no signs of abd trauma,.

## 2022-09-26 ENCOUNTER — ASOB RESULT (OUTPATIENT)
Age: 27
End: 2022-09-26

## 2022-09-26 ENCOUNTER — APPOINTMENT (OUTPATIENT)
Dept: ANTEPARTUM | Facility: CLINIC | Age: 27
End: 2022-09-26

## 2022-09-26 PROCEDURE — 76817 TRANSVAGINAL US OBSTETRIC: CPT

## 2022-09-26 PROCEDURE — 76811 OB US DETAILED SNGL FETUS: CPT

## 2022-10-03 ENCOUNTER — APPOINTMENT (OUTPATIENT)
Dept: OBGYN | Facility: CLINIC | Age: 27
End: 2022-10-03

## 2022-10-03 PROCEDURE — 0502F SUBSEQUENT PRENATAL CARE: CPT

## 2022-10-03 RX ORDER — ONDANSETRON 4 MG/1
4 TABLET, ORALLY DISINTEGRATING ORAL
Qty: 30 | Refills: 1 | Status: ACTIVE | COMMUNITY
Start: 2022-08-08 | End: 1900-01-01

## 2022-10-04 LAB
BILIRUB UR QL STRIP: NORMAL
CLARITY UR: CLEAR
COLLECTION METHOD: NORMAL
GLUCOSE UR-MCNC: NORMAL
HCG UR QL: 0.2 EU/DL
HGB UR QL STRIP.AUTO: NORMAL
KETONES UR-MCNC: NORMAL
LEUKOCYTE ESTERASE UR QL STRIP: NORMAL
NITRITE UR QL STRIP: NORMAL
PH UR STRIP: 8.5
PROT UR STRIP-MCNC: NORMAL
SP GR UR STRIP: 1.02

## 2022-10-14 ENCOUNTER — APPOINTMENT (OUTPATIENT)
Dept: ANTEPARTUM | Facility: CLINIC | Age: 27
End: 2022-10-14

## 2022-10-14 ENCOUNTER — ASOB RESULT (OUTPATIENT)
Age: 27
End: 2022-10-14

## 2022-10-14 ENCOUNTER — APPOINTMENT (OUTPATIENT)
Dept: ORTHOPEDIC SURGERY | Facility: CLINIC | Age: 27
End: 2022-10-14

## 2022-10-14 PROCEDURE — 76816 OB US FOLLOW-UP PER FETUS: CPT

## 2022-10-18 ENCOUNTER — NON-APPOINTMENT (OUTPATIENT)
Age: 27
End: 2022-10-18

## 2022-10-31 ENCOUNTER — APPOINTMENT (OUTPATIENT)
Dept: OBGYN | Facility: CLINIC | Age: 27
End: 2022-10-31

## 2022-10-31 VITALS
BODY MASS INDEX: 32.07 KG/M2 | HEIGHT: 63 IN | WEIGHT: 181 LBS | DIASTOLIC BLOOD PRESSURE: 78 MMHG | SYSTOLIC BLOOD PRESSURE: 128 MMHG | HEART RATE: 108 BPM

## 2022-10-31 PROCEDURE — 0502F SUBSEQUENT PRENATAL CARE: CPT

## 2022-11-14 LAB
BASOPHILS # BLD AUTO: 0.03 K/UL
BASOPHILS NFR BLD AUTO: 0.3 %
EOSINOPHIL # BLD AUTO: 0.09 K/UL
EOSINOPHIL NFR BLD AUTO: 0.8 %
GLUCOSE 1H P 50 G GLC PO SERPL-MCNC: 113 MG/DL
HCT VFR BLD CALC: 35.2 %
HGB BLD-MCNC: 11.3 G/DL
IMM GRANULOCYTES NFR BLD AUTO: 0.6 %
LYMPHOCYTES # BLD AUTO: 2.24 K/UL
LYMPHOCYTES NFR BLD AUTO: 19.6 %
MAN DIFF?: NORMAL
MCHC RBC-ENTMCNC: 30.7 PG
MCHC RBC-ENTMCNC: 32.1 GM/DL
MCV RBC AUTO: 95.7 FL
MONOCYTES # BLD AUTO: 0.69 K/UL
MONOCYTES NFR BLD AUTO: 6 %
NEUTROPHILS # BLD AUTO: 8.31 K/UL
NEUTROPHILS NFR BLD AUTO: 72.7 %
PLATELET # BLD AUTO: 211 K/UL
RBC # BLD: 3.68 M/UL
RBC # FLD: 12.9 %
WBC # FLD AUTO: 11.43 K/UL

## 2022-11-21 ENCOUNTER — APPOINTMENT (OUTPATIENT)
Dept: OBGYN | Facility: CLINIC | Age: 27
End: 2022-11-21

## 2022-11-21 ENCOUNTER — NON-APPOINTMENT (OUTPATIENT)
Age: 27
End: 2022-11-21

## 2022-11-21 VITALS
HEIGHT: 63 IN | WEIGHT: 184 LBS | DIASTOLIC BLOOD PRESSURE: 80 MMHG | SYSTOLIC BLOOD PRESSURE: 120 MMHG | BODY MASS INDEX: 32.6 KG/M2 | HEART RATE: 99 BPM

## 2022-11-21 PROCEDURE — 0502F SUBSEQUENT PRENATAL CARE: CPT

## 2022-11-21 PROCEDURE — 81002 URINALYSIS NONAUTO W/O SCOPE: CPT

## 2022-12-06 ENCOUNTER — APPOINTMENT (OUTPATIENT)
Dept: OBGYN | Facility: CLINIC | Age: 27
End: 2022-12-06

## 2022-12-06 VITALS
BODY MASS INDEX: 32.24 KG/M2 | DIASTOLIC BLOOD PRESSURE: 80 MMHG | HEART RATE: 102 BPM | WEIGHT: 182 LBS | SYSTOLIC BLOOD PRESSURE: 126 MMHG

## 2022-12-06 PROCEDURE — 0502F SUBSEQUENT PRENATAL CARE: CPT

## 2022-12-06 PROCEDURE — 81002 URINALYSIS NONAUTO W/O SCOPE: CPT

## 2022-12-19 ENCOUNTER — APPOINTMENT (OUTPATIENT)
Dept: ANTEPARTUM | Facility: CLINIC | Age: 27
End: 2022-12-19

## 2022-12-19 ENCOUNTER — ASOB RESULT (OUTPATIENT)
Age: 27
End: 2022-12-19

## 2022-12-19 PROCEDURE — 76819 FETAL BIOPHYS PROFIL W/O NST: CPT | Mod: 59

## 2022-12-19 PROCEDURE — 76816 OB US FOLLOW-UP PER FETUS: CPT

## 2022-12-20 ENCOUNTER — APPOINTMENT (OUTPATIENT)
Dept: OBGYN | Facility: CLINIC | Age: 27
End: 2022-12-20

## 2022-12-20 ENCOUNTER — INPATIENT (INPATIENT)
Facility: HOSPITAL | Age: 27
LOS: 1 days | Discharge: HOME | DRG: 832 | End: 2022-12-22
Attending: OBSTETRICS & GYNECOLOGY | Admitting: OBSTETRICS & GYNECOLOGY
Payer: COMMERCIAL

## 2022-12-20 VITALS
SYSTOLIC BLOOD PRESSURE: 118 MMHG | HEIGHT: 63 IN | HEART RATE: 93 BPM | WEIGHT: 185 LBS | BODY MASS INDEX: 32.78 KG/M2 | DIASTOLIC BLOOD PRESSURE: 76 MMHG

## 2022-12-20 VITALS
TEMPERATURE: 99 F | HEART RATE: 104 BPM | SYSTOLIC BLOOD PRESSURE: 134 MMHG | DIASTOLIC BLOOD PRESSURE: 83 MMHG | RESPIRATION RATE: 16 BRPM

## 2022-12-20 DIAGNOSIS — O47.02 FALSE LABOR BEFORE 37 COMPLETED WEEKS OF GESTATION, SECOND TRIMESTER: ICD-10-CM

## 2022-12-20 DIAGNOSIS — Z98.890 OTHER SPECIFIED POSTPROCEDURAL STATES: Chronic | ICD-10-CM

## 2022-12-20 LAB
APPEARANCE UR: CLEAR — SIGNIFICANT CHANGE UP
BILIRUB UR-MCNC: NEGATIVE — SIGNIFICANT CHANGE UP
COLOR SPEC: YELLOW — SIGNIFICANT CHANGE UP
DIFF PNL FLD: ABNORMAL
EPI CELLS # UR: SIGNIFICANT CHANGE UP
GLUCOSE UR QL: NEGATIVE MG/DL — SIGNIFICANT CHANGE UP
HCT VFR BLD CALC: 36.3 % — SIGNIFICANT CHANGE UP (ref 34.5–45)
HGB BLD-MCNC: 12.1 G/DL — SIGNIFICANT CHANGE UP (ref 11.5–15.5)
KETONES UR-MCNC: ABNORMAL
LEUKOCYTE ESTERASE UR-ACNC: ABNORMAL
MCHC RBC-ENTMCNC: 30.3 PG — SIGNIFICANT CHANGE UP (ref 27–34)
MCHC RBC-ENTMCNC: 33.3 GM/DL — SIGNIFICANT CHANGE UP (ref 32–36)
MCV RBC AUTO: 90.8 FL — SIGNIFICANT CHANGE UP (ref 80–100)
NITRITE UR-MCNC: NEGATIVE — SIGNIFICANT CHANGE UP
PH UR: 7 — SIGNIFICANT CHANGE UP (ref 5–8)
PLATELET # BLD AUTO: 177 K/UL — SIGNIFICANT CHANGE UP (ref 150–400)
PROT UR-MCNC: NEGATIVE — SIGNIFICANT CHANGE UP
RBC # BLD: 4 M/UL — SIGNIFICANT CHANGE UP (ref 3.8–5.2)
RBC # FLD: 13.2 % — SIGNIFICANT CHANGE UP (ref 10.3–14.5)
RBC CASTS # UR COMP ASSIST: SIGNIFICANT CHANGE UP /HPF (ref 0–4)
SP GR SPEC: 1.01 — SIGNIFICANT CHANGE UP (ref 1.01–1.02)
UROBILINOGEN FLD QL: NEGATIVE MG/DL — SIGNIFICANT CHANGE UP
WBC # BLD: 14.09 K/UL — HIGH (ref 3.8–10.5)
WBC # FLD AUTO: 14.09 K/UL — HIGH (ref 3.8–10.5)
WBC UR QL: SIGNIFICANT CHANGE UP /HPF (ref 0–5)

## 2022-12-20 PROCEDURE — 0502F SUBSEQUENT PRENATAL CARE: CPT

## 2022-12-20 PROCEDURE — 81002 URINALYSIS NONAUTO W/O SCOPE: CPT

## 2022-12-20 RX ORDER — SODIUM CHLORIDE 9 MG/ML
1000 INJECTION, SOLUTION INTRAVENOUS ONCE
Refills: 0 | Status: COMPLETED | OUTPATIENT
Start: 2022-12-20 | End: 2022-12-20

## 2022-12-20 RX ORDER — ACETAMINOPHEN 500 MG
1000 TABLET ORAL ONCE
Refills: 0 | Status: COMPLETED | OUTPATIENT
Start: 2022-12-20 | End: 2022-12-20

## 2022-12-20 RX ORDER — FAMOTIDINE 10 MG/ML
20 INJECTION INTRAVENOUS ONCE
Refills: 0 | Status: COMPLETED | OUTPATIENT
Start: 2022-12-20 | End: 2022-12-20

## 2022-12-20 RX ADMIN — FAMOTIDINE 20 MILLIGRAM(S): 10 INJECTION INTRAVENOUS at 21:34

## 2022-12-20 RX ADMIN — SODIUM CHLORIDE 1000 MILLILITER(S): 9 INJECTION, SOLUTION INTRAVENOUS at 21:15

## 2022-12-20 RX ADMIN — Medication 12 MILLIGRAM(S): at 22:03

## 2022-12-20 RX ADMIN — Medication 400 MILLIGRAM(S): at 21:34

## 2022-12-20 NOTE — OB PROVIDER TRIAGE NOTE - HISTORY OF PRESENT ILLNESS
MELANI GRISSOM is a 26yo  at 32w6d who presents from the office for lateral back pain that wraps around her abdomen. Describes pain as intermittent, [id number], 9/10 in severity. Patient is uncertain if these are contraction pains.      Denies leakage of fluids, vaginal bleeding, painful contractions. Reports active fetal movement.   Denies fever, chills. Reports nausea of 1d duration and an episode of vomiting yesterday.  Denies dysuria, hematuria, abnormal vaginal discharge, h/o renal stones.  Denies HA, RUQ pain, vision changes, increased leg swelling.     PNC w. Dr. Santillan     OBhx: denies  Gyn: denies  PMH: denies  PSH: denies  Med: PNV  Allergies: NKDA    Vitals:   T(C): 36.4 (22 @ 19:14), Max: 37 (22 @ 19:05)  T(F): 97.52 (22 @ 19:14), Max: 98.6 (22 @ 19:05)  HR: 99 (22 @ 21:19) (99 - 104)  BP: 129/74 (20-22 @ 21:19) (129/74 - 134/83)  RR: 16 (22 @ 19:14) (16 - 16)    General: AAOx3, NAD  Abd: Soft, nontender, gravid  SVE: 0.5/20/-3 @ 1930 > 0.5/20/-2 @ 2100    FHT:   [place]:  uterine irritability    MFM sono:   Bedside sono: ***      A/P:     - Admit to L&D for eIOL:        Admission labs        Consent        Induction method: ***  - Admit to L&D for CS:        Pre op labs        Consent        Antibiotics  - Labs:         GBS         Rub I/I        HIV        Syphilis        Blood type        Hb  - Maternal and fetal status reassuring, will continue to monitor ***        VSS        Cephalic presentation        Cat 1 tracing        Not erick  - Analgesia: ***    D/w ***     MELANI GRISSOM is a 26yo  at 32w6d who presents from the office for lateral back pain that wraps around her abdomen. Describes pain as intermittent, [id number], 9/10 in severity. Patient is uncertain if these are contraction pains.      Denies leakage of fluids, vaginal bleeding, painful contractions. Reports active fetal movement.   Denies fever, chills. Reports nausea of 1d duration and an episode of vomiting yesterday.  Denies dysuria, hematuria, abnormal vaginal discharge, h/o renal stones.  Denies HA, RUQ pain, vision changes, increased leg swelling.     PNC w. Dr. Santillan     OBhx: denies  Gyn: denies  PMH: denies  PSH: denies  Med: PNV  Allergies: NKDA    Vitals:   T(C): 36.4 (22 @ 19:14), Max: 37 (22 @ 19:05)  T(F): 97.52 (22 @ 19:14), Max: 98.6 (22 @ 19:05)  HR: 99 (22 @ 21:19) (99 - 104)  BP: 129/74 (-22 @ 21:19) (129/74 - 134/83)  RR: 16 (22 @ 19:14) (16 - 16)    General: AAOx3, NAD  Abd: Soft, nontender, gravid  SVE: 0.5/20/-3 @ 1930 > 0.5/20/-2 @ 2100    FHT: baseline 120, moderate variability, +accels, no decels  [place]:  uterine irritability, at times irregular contractions  Bedside sono: cervical length 3.7, vertex presentation

## 2022-12-20 NOTE — OB PROVIDER TRIAGE NOTE - NSOBPROVIDERNOTE_OBGYN_ALL_OB_FT
# r/o PTL  - Uterine irritability vs contractions on toco  - Cervical exam 0.5/20/-3, unchanged after 1.5h  - IVF bolus for uterine irritability, IV tylenol for pain  - Baseline cervical length at anatomy scan ~5cm  - MFM consulted - recommend TVUS for CL    # r/o epigastric pain  - pepcid ordered    # r/o UTI  - UA showing +ketones, +blood MELANI GRISSOM is a 28yo  at 32w6d who presents as a r/o  labor    # r/o PTL  - Uterine irritability vs contractions on toco  - Cervical exam 0.5/20/-3, unchanged after 1.5h  - IVF bolus for uterine irritability, IV tylenol for pain -> no improvement with pain  - Baseline cervical length at anatomy scan ~5cm > cervical length 3.7 on bedside TVUS today; FFN not indicated given >2.5   - discussed with Dr Santillan, antental betamethasone course and observation of  contractions     # r/o epigastric pain  - pepcid ordered    # r/o UTI  - UA showing +ketones, +blood  - PO and IV hydration   - UCx sent    discussed with attending Dr Santillan

## 2022-12-20 NOTE — CONSULT NOTE ADULT - ASSESSMENT
MELNAI GRISSOM is a 28yo  at 32w6d who presents as a r/o  labor  MFM consulted to consider  steroid use    # r/o PTL  - Uterine irritability vs contractions on toco  - Cervical exam 0.5/20/-3, unchanged after 1.5h  - IVF bolus for uterine irritability, IV tylenol for pain -> no initial improvement with pain   - Baseline cervical length at anatomy scan ~5cm > cervical length 3.7 on bedside TVUS today; FFN not indicated given >2.5   - discussed with attending Dr Wick, given no cervical change and CL >2.5, do not recommend  betamethasone course at this time; consider further close monitoring and observe of  contractions

## 2022-12-20 NOTE — OB PROVIDER TRIAGE NOTE - ATTENDING COMMENTS
26yo  at 32w6d who presents from the office for lateral back pain that wraps around her abdomen. Describes pain as intermittent, [id number], 9/10 in severity. Patient is uncertain if these are contraction pains.      Patient kept for observation to evaluate for  labor    US for cervical length  pepcid ordered

## 2022-12-20 NOTE — CONSULT NOTE ADULT - SUBJECTIVE AND OBJECTIVE BOX
26yo  at 32w6d who presents from the office for lateral back pain that wraps around her abdomen. Describes pain as intermittent, [id number], 9/10 in severity. Patient is uncertain if these are contraction pains.      Denies leakage of fluids, vaginal bleeding, painful contractions. Reports active fetal movement.   Denies fever, chills. Reports nausea of 1d duration and an episode of vomiting yesterday.  Denies dysuria, hematuria, abnormal vaginal discharge, h/o renal stones.  Denies HA, RUQ pain, vision changes, increased leg swelling.     PNC w. Dr. Santillan     OBhx: denies  Gyn: denies  PMH: denies  PSH: denies  Med: PNV  Allergies: NKDA    Vitals:   T(C): 36.4 (22 @ 19:14), Max: 37 (22 @ 19:05)  T(F): 97.52 (22 @ 19:14), Max: 98.6 (-22 @ 19:05)  HR: 99 (22 @ 21:19) (99 - 104)  BP: 129/74 (20-22 @ 21:19) (129/74 - 134/83)  RR: 16 (22 @ 19:14) (16 - 16)    General: AAOx3, NAD  Abd: Soft, nontender, gravid  SVE: 0.5/20/-3 @ 1930 > 0.5/20/-2 @ 2100    FHT: baseline 120, moderate variability, +accels, no decels  [place]:  uterine irritability, at times irregular contractions  Bedside sono: cervical length 3.7, vertex presentation

## 2022-12-21 ENCOUNTER — TRANSCRIPTION ENCOUNTER (OUTPATIENT)
Age: 27
End: 2022-12-21

## 2022-12-21 LAB
ALBUMIN SERPL ELPH-MCNC: 3.6 G/DL — SIGNIFICANT CHANGE UP (ref 3.3–5.2)
ALP SERPL-CCNC: 116 U/L — SIGNIFICANT CHANGE UP (ref 40–120)
ALT FLD-CCNC: 9 U/L — SIGNIFICANT CHANGE UP
ANION GAP SERPL CALC-SCNC: 14 MMOL/L — SIGNIFICANT CHANGE UP (ref 5–17)
APTT BLD: 25.8 SEC — LOW (ref 27.5–35.5)
AST SERPL-CCNC: 15 U/L — SIGNIFICANT CHANGE UP
BASOPHILS # BLD AUTO: 0.02 K/UL — SIGNIFICANT CHANGE UP (ref 0–0.2)
BASOPHILS NFR BLD AUTO: 0.1 % — SIGNIFICANT CHANGE UP (ref 0–2)
BILIRUB SERPL-MCNC: 0.5 MG/DL — SIGNIFICANT CHANGE UP (ref 0.4–2)
BLD GP AB SCN SERPL QL: SIGNIFICANT CHANGE UP
BUN SERPL-MCNC: 5.5 MG/DL — LOW (ref 8–20)
CALCIUM SERPL-MCNC: 9.5 MG/DL — SIGNIFICANT CHANGE UP (ref 8.4–10.5)
CHLORIDE SERPL-SCNC: 102 MMOL/L — SIGNIFICANT CHANGE UP (ref 96–108)
CO2 SERPL-SCNC: 19 MMOL/L — LOW (ref 22–29)
COVID-19 SPIKE DOMAIN AB INTERP: POSITIVE
COVID-19 SPIKE DOMAIN ANTIBODY RESULT: >250 U/ML — HIGH
CREAT SERPL-MCNC: 0.53 MG/DL — SIGNIFICANT CHANGE UP (ref 0.5–1.3)
EGFR: 130 ML/MIN/1.73M2 — SIGNIFICANT CHANGE UP
EOSINOPHIL # BLD AUTO: 0 K/UL — SIGNIFICANT CHANGE UP (ref 0–0.5)
EOSINOPHIL NFR BLD AUTO: 0 % — SIGNIFICANT CHANGE UP (ref 0–6)
FIBRINOGEN PPP-MCNC: 854 MG/DL — CRITICAL HIGH (ref 330–520)
GLUCOSE SERPL-MCNC: 128 MG/DL — HIGH (ref 70–99)
HCT VFR BLD CALC: 34.3 % — LOW (ref 34.5–45)
HGB BLD-MCNC: 11.4 G/DL — LOW (ref 11.5–15.5)
HIV 1 & 2 AB SERPL IA.RAPID: SIGNIFICANT CHANGE UP
IMM GRANULOCYTES NFR BLD AUTO: 0.8 % — SIGNIFICANT CHANGE UP (ref 0–0.9)
LYMPHOCYTES # BLD AUTO: 1.54 K/UL — SIGNIFICANT CHANGE UP (ref 1–3.3)
LYMPHOCYTES # BLD AUTO: 9.8 % — LOW (ref 13–44)
MCHC RBC-ENTMCNC: 30.2 PG — SIGNIFICANT CHANGE UP (ref 27–34)
MCHC RBC-ENTMCNC: 33.2 GM/DL — SIGNIFICANT CHANGE UP (ref 32–36)
MCV RBC AUTO: 91 FL — SIGNIFICANT CHANGE UP (ref 80–100)
MONOCYTES # BLD AUTO: 0.53 K/UL — SIGNIFICANT CHANGE UP (ref 0–0.9)
MONOCYTES NFR BLD AUTO: 3.4 % — SIGNIFICANT CHANGE UP (ref 2–14)
NEUTROPHILS # BLD AUTO: 13.47 K/UL — HIGH (ref 1.8–7.4)
NEUTROPHILS NFR BLD AUTO: 85.9 % — HIGH (ref 43–77)
PLATELET # BLD AUTO: 195 K/UL — SIGNIFICANT CHANGE UP (ref 150–400)
POTASSIUM SERPL-MCNC: 4.2 MMOL/L — SIGNIFICANT CHANGE UP (ref 3.5–5.3)
POTASSIUM SERPL-SCNC: 4.2 MMOL/L — SIGNIFICANT CHANGE UP (ref 3.5–5.3)
PROT SERPL-MCNC: 6.7 G/DL — SIGNIFICANT CHANGE UP (ref 6.6–8.7)
RBC # BLD: 3.77 M/UL — LOW (ref 3.8–5.2)
RBC # FLD: 13.1 % — SIGNIFICANT CHANGE UP (ref 10.3–14.5)
SARS-COV-2 IGG+IGM SERPL QL IA: >250 U/ML — HIGH
SARS-COV-2 IGG+IGM SERPL QL IA: POSITIVE
SARS-COV-2 RNA SPEC QL NAA+PROBE: SIGNIFICANT CHANGE UP
SODIUM SERPL-SCNC: 135 MMOL/L — SIGNIFICANT CHANGE UP (ref 135–145)
URATE SERPL-MCNC: 4.5 MG/DL — SIGNIFICANT CHANGE UP (ref 2.4–5.7)
WBC # BLD: 15.69 K/UL — HIGH (ref 3.8–10.5)
WBC # FLD AUTO: 15.69 K/UL — HIGH (ref 3.8–10.5)

## 2022-12-21 RX ADMIN — Medication 12 MILLIGRAM(S): at 22:04

## 2022-12-21 NOTE — DISCHARGE NOTE ANTEPARTUM - PATIENT PORTAL LINK FT
You can access the FollowMyHealth Patient Portal offered by [facility] by registering at the following website: [email or web address]. By joining [email or web address]’s FollowMyHealth portal, you will also be able to view your health information using other applications (apps) compatible with our system.

## 2022-12-21 NOTE — OB RN PATIENT PROFILE - NS_DATEOFLASTVISIT_OBGYN_ALL_OB_DT
Pt presents w/ upper abdominal pain associated w/ n/v. Denies any fever, dizziness, flank pain, diarrhea or constipation. pt was here 3 days ago and diagnosed wioth coitis. pt reports pian and n/v has not decreased since.
20-Dec-2022

## 2022-12-21 NOTE — DISCHARGE NOTE ANTEPARTUM - CARE PLAN
Principal Discharge DX:	 contractions  Assessment and plan of treatment:	Patient did not have any cervical change during admission to the hospital, s/p betamethasone course.   1 Principal Discharge DX:	 contractions  Assessment and plan of treatment:	Patient did not have any cervical change during admission to the hospital, s/p betamethasone course.  FHRT reactive.

## 2022-12-21 NOTE — DISCHARGE NOTE ANTEPARTUM - PLAN OF CARE
Patient did not have any cervical change during admission to the hospital, s/p betamethasone course. Patient did not have any cervical change during admission to the hospital, s/p betamethasone course.  FHRT reactive.

## 2022-12-21 NOTE — CHART NOTE - NSCHARTNOTEFT_GEN_A_CORE
MFM - Patient seen for follow up on morning rounds.  IUP at 32w5d admitted with  contractions.  Decision made to admit patient and administer betamethasone after discussion with M covering overnight.  This am, reports continued uterine irritability which is not painful.  Denies need for analgesia.  No LOF or vaginal bleeding.  Endorses fetal movement.  No other constitutional complaint.  FHRT reactive with irritability noted.  SSE performed and cervix appears closed without discharge.  Cultures obtained.  UA with ketones but no evidence of infection.  Culture pending.  Stable for transfer to floor.  Complete betamethasone.  No indication for tocolysis at this time.   NST BID and prn.  Trend labs.  All questions answered.   Cherie Massey MD  Maternal Fetal Medicine
Patient evaluated  Denies vaginal bleeding, loss of fluid. +fetal movement, unchanged.  NST with baseline 150, moderate variability, +accels, no decels  will conitnue to monitor    discussed with attending Dr Mendoza

## 2022-12-21 NOTE — DISCHARGE NOTE ANTEPARTUM - CARE PROVIDER_API CALL
Amena Santillan)  Obstetrics and Gynecology  [address] [place] [address]  Phone: [phone]  Fax: [phone]  Follow Up Time: 1-3 days

## 2022-12-21 NOTE — OB RN PATIENT PROFILE - NS_MODEOFARRIVAL_OBGYN_ALL_OB
**STROKE CODE CONSULT NOTE**    Last known well time/Time of onset of symptoms: 12:00    HPI: 84F with CAD s/p CABG x 3, HTN, HLD, NIDDM, AF s/p PPM on Eliquis, TAVR, COPD (home O2), hypothyroidism, chronic R knee pain pending TKR in July who presents with two episodes of word finding difficulty within the last 24h. First episode was yesterday evening and resolved within 10 minutes. Today he was at lunch with his friend when he had another episode lasting 15 mins. He states he couldn't find the right words to say. Of Note its is unclear to what level of adherence the patient has with his medications because he doesn't know which medications he takes and how frequently at this time. This has not happened to him before. He denies any weakness or gait imbalance.     PAST MEDICAL & SURGICAL HISTORY:  High cholesterol  Diabetes  Prostate cancer: in remission  Hypertension  COPD (chronic obstructive pulmonary disease)  H/O aortic valve replacement  S/P CABG x 3  Artificial pacemaker  History of cataract surgery: b/l  History of knee replacement: b/l      FAMILY HISTORY:  Family history of acute myocardial infarction (Father)      SOCIAL HISTORY:  Smoking Cesation: Discussed    ROS:  Constitutional: No fever, weight loss or fatigue  Eyes: No eye pain, visual disturbances, or discharge  ENMT:  No difficulty hearing, tinnitus, vertigo; No sinus or throat pain  Neck: No pain or stiffness  Respiratory: No cough, wheezing, chills or hemoptysis  Cardiovascular: No chest pain, palpitations, shortness of breath, dizziness or leg swelling  Gastrointestinal: No abdominal pain. No nausea, vomiting or hematemesis; No diarrhea or constipation. Nohematochezia.  Genitourinary: No dysuria, frequency, hematuria or incontinence  Neurological: As per HPI  Skin: No itching, burning, rashes or lesions   Endocrine: No heat or cold intolerance; No hair loss  Musculoskeletal: No joint pain or swelling; No muscle, back or extremity pain  Psychiatric: No depression, anxiety, mood swings or difficulty sleeping  Heme/Lymph: No easy bruising or bleeding gums    MEDICATIONS  (STANDING):  ALBUTerol/ipratropium for Nebulization. 3 milliLiter(s) Nebulizer once  apixaban 5 milliGRAM(s) Oral every 12 hours  atorvastatin 80 milliGRAM(s) Oral at bedtime  heparin  Injectable 5000 Unit(s) SubCutaneous every 8 hours    MEDICATIONS  (PRN):      Allergies    No Known Allergies    Intolerances    narcotic analgesics (Nausea; Vomiting)      Vital Signs Last 24 Hrs  T(C): 36.6 (05 Jun 2019 14:13), Max: 36.6 (05 Jun 2019 14:13)  T(F): 97.9 (05 Jun 2019 14:13), Max: 97.9 (05 Jun 2019 14:13)  HR: 96 (05 Jun 2019 14:52) (92 - 96)  BP: 166/81 (05 Jun 2019 14:52) (156/88 - 166/81)  BP(mean): --  RR: 20 (05 Jun 2019 14:52) (20 - 20)  SpO2: 94% (05 Jun 2019 14:52) (94% - 97%)    PHYSICAL EXAM:  Constitutional: WDWN; NAD  Cardiovascular: RRR, no appreciable murmurs; no carotid bruits  Neurologic:  Mental status: Awake, alert and oriented x3.  Recent and remote memory intact.  Naming, repetition and comprehension intact.  Attention/concentration intact.  +dysarthria, no aphasia.  Fund of knowledge appropriate.    Cranial nerves: Pupils equally round and reactive to light, visual fields full, no nystagmus, extraocular muscles intact, V1 through V3 intact bilaterally and symmetric, face symmetric, hearing intact to finger rub, palate elevation symmetric, tongue was midline, sternocleidomastoid/shoulder shrug strength bilaterally 5/5.    Motor:  Normal bulk and tone, strength 5/5 in bilateral upper and lower extremities.   strength 5/5.  Rapid alternating movements intact and symmetric.   Sensation: Intact to light touch, proprioception, and pinprick.  No neglect.   Coordination: +dysmetria on finger-to-nose but no dysmetria on heel-to-shin.    Reflexes: 2+ in upper and lower extremities, downgoing toes bilaterally  Gait: Narrow and steady. No ataxia.  Romberg negative    NIHSS: 4    Fingerstick Blood Glucose: CAPILLARY BLOOD GLUCOSE  84 (05 Jun 2019 14:49)      POCT Blood Glucose.: 84 mg/dL (05 Jun 2019 14:17)       LABS:                        13.7   11.58 )-----------( 186      ( 05 Jun 2019 14:37 )             43.1     06-05    141  |  102  |  21  ----------------------------<  89  4.8   |  28  |  1.14    Ca    9.3      05 Jun 2019 14:37    TPro  7.5  /  Alb  4.0  /  TBili  0.4  /  DBili  x   /  AST  20  /  ALT  10  /  AlkPhos  69  06-05    PT/INR - ( 05 Jun 2019 14:37 )   PT: 14.8 sec;   INR: 1.30          PTT - ( 05 Jun 2019 14:37 )  PTT:28.9 sec  CARDIAC MARKERS ( 05 Jun 2019 14:37 )  x     / <0.01 ng/mL / x     / x     / x              RADIOLOGY & ADDITIONAL STUDIES:    IV-tPA (Y/N):                 N                  Bolus time:  Reason IV-tPA not given: Eliquis     ASSESSMENT/PLAN: Car

## 2022-12-21 NOTE — OB PROVIDER LABOR PROGRESS NOTE - ASSESSMENT
A/P: 26yo  @ 32w6d admitted for 24h observation to r/o PTL and for BMZ administration    - tracing reactive with decel x1, otherwise tracing wnl  - contraction pattern unknown, will adjust toco  - 0.5/20/-3 @ 1730 and 2100  - s/p BMZ  - will continue to monitor

## 2022-12-21 NOTE — H&P ADULT - HISTORY OF PRESENT ILLNESS
26yo  at 32w6d who presents from the office for lateral back pain that wraps around her abdomen. Describes pain as intermittent, [id number], 9/10 in severity. Patient is uncertain if these are contraction pains.      Denies leakage of fluids, vaginal bleeding, painful contractions. Reports active fetal movement.   Denies fever, chills. Reports nausea of 1d duration and an episode of vomiting yesterday.  Denies dysuria, hematuria, abnormal vaginal discharge, h/o renal stones.  Denies HA, RUQ pain, vision changes, increased leg swelling.     PNC w. Dr. Santillan     OBhx: denies  Gyn: denies  PMH: denies  PSH: denies  Med: PNV  Allergies: NKDA    Vitals:   T(C): 36.4 (22 @ 19:14), Max: 37 (22 @ 19:05)  T(F): 97.52 (22 @ 19:14), Max: 98.6 (-22 @ 19:05)  HR: 99 (22 @ 21:19) (99 - 104)  BP: 129/74 (20-22 @ 21:19) (129/74 - 134/83)  RR: 16 (22 @ 19:14) (16 - 16)    General: AAOx3, NAD  Abd: Soft, nontender, gravid  SVE: 0.5/20/-3 @ 1930 > 0.5/20/-2 @ 2100    FHT: baseline 120, moderate variability, +accels, no decels  [place]:  uterine irritability, at times irregular contractions  Bedside sono: cervical length 3.7, vertex presentation 26yo  at 32w6d who presents from the office for lateral back pain that wraps around her abdomen. Describes pain as intermittent, [id number], 9/10 in severity. Patient is uncertain if these are contraction pains.      Denies leakage of fluids, vaginal bleeding, painful contractions. Reports active fetal movement.   Denies fever, chills. Reports nausea of 1d duration and an episode of vomiting yesterday.  Denies dysuria, hematuria, abnormal vaginal discharge, h/o renal stones.  Denies HA, RUQ pain, vision changes, increased leg swelling.     PNC w. Dr. Santillan     OBhx: denies  Gyn: denies  PMH: SVT s/p ablation, complex regional pain syndrom  PSH: denies  Med: PNV  Allergies: NKDA    Vitals:   T(C): 36.4 (22 @ 19:14), Max: 37 (22 @ 19:05)  T(F): 97.52 (22 @ 19:14), Max: 98.6 (22 @ 19:05)  HR: 99 (22 @ 21:19) (99 - 104)  BP: 129/74 (22 @ 21:19) (129/74 - 134/83)  RR: 16 (22 @ 19:14) (16 - 16)    General: AAOx3, NAD  Abd: Soft, nontender, gravid  SVE: 0.5/20/-3 @ 1930 > 0.5/20/-2 @ 2100    FHT: baseline 120, moderate variability, +accels, no decels  [place]:  uterine irritability, at times irregular contractions  Bedside sono: cervical length 3.7, vertex presentation

## 2022-12-21 NOTE — H&P ADULT - ASSESSMENT
MELANI GRISSOM is a 28yo  at 32w6d who presents as a r/o  labor  MFM consulted to consider  steroid use    # r/o PTL  - Uterine irritability vs contractions on toco  - Cervical exam 0.5/20/-3, unchanged after 1.5h  - IVF bolus for uterine irritability, IV tylenol for pain -> no initial improvement with pain   - Baseline cervical length at anatomy scan ~5cm > cervical length 3.7 on bedside TVUS today; FFN not indicated given >2.5   - discussed with attending Dr Wick, given no cervical change and CL >2.5, do not recommend  betamethasone course at this time; consider further close monitoring and observe of  contractions

## 2022-12-21 NOTE — DISCHARGE NOTE ANTEPARTUM - HOSPITAL COURSE
Patient admitted for  contractions, she was treated with betamethasone course and cervix remained unchanged during her admission.

## 2022-12-22 VITALS
SYSTOLIC BLOOD PRESSURE: 100 MMHG | RESPIRATION RATE: 16 BRPM | HEART RATE: 106 BPM | DIASTOLIC BLOOD PRESSURE: 58 MMHG | TEMPERATURE: 98 F

## 2022-12-22 DIAGNOSIS — Z29.9 ENCOUNTER FOR PROPHYLACTIC MEASURES, UNSPECIFIED: ICD-10-CM

## 2022-12-22 DIAGNOSIS — I47.1 SUPRAVENTRICULAR TACHYCARDIA: ICD-10-CM

## 2022-12-22 DIAGNOSIS — O47.00 FALSE LABOR BEFORE 37 COMPLETED WEEKS OF GESTATION, UNSPECIFIED TRIMESTER: ICD-10-CM

## 2022-12-22 DIAGNOSIS — Z3A.33 33 WEEKS GESTATION OF PREGNANCY: ICD-10-CM

## 2022-12-22 DIAGNOSIS — Z3A.32 32 WEEKS GESTATION OF PREGNANCY: ICD-10-CM

## 2022-12-22 LAB
C TRACH RRNA SPEC QL NAA+PROBE: SIGNIFICANT CHANGE UP
CANDIDA AB TITR SER: SIGNIFICANT CHANGE UP
CULTURE RESULTS: SIGNIFICANT CHANGE UP
G VAGINALIS DNA SPEC QL NAA+PROBE: DETECTED
N GONORRHOEA RRNA SPEC QL NAA+PROBE: SIGNIFICANT CHANGE UP
SPECIMEN SOURCE: SIGNIFICANT CHANGE UP
SPECIMEN SOURCE: SIGNIFICANT CHANGE UP
T VAGINALIS SPEC QL WET PREP: SIGNIFICANT CHANGE UP

## 2022-12-22 PROCEDURE — 87800 DETECT AGNT MULT DNA DIREC: CPT

## 2022-12-22 PROCEDURE — 85025 COMPLETE CBC W/AUTO DIFF WBC: CPT

## 2022-12-22 PROCEDURE — 86850 RBC ANTIBODY SCREEN: CPT

## 2022-12-22 PROCEDURE — 36415 COLL VENOUS BLD VENIPUNCTURE: CPT

## 2022-12-22 PROCEDURE — 86769 SARS-COV-2 COVID-19 ANTIBODY: CPT

## 2022-12-22 PROCEDURE — 80053 COMPREHEN METABOLIC PANEL: CPT

## 2022-12-22 PROCEDURE — 99232 SBSQ HOSP IP/OBS MODERATE 35: CPT | Mod: GC

## 2022-12-22 PROCEDURE — 85027 COMPLETE CBC AUTOMATED: CPT

## 2022-12-22 PROCEDURE — 87086 URINE CULTURE/COLONY COUNT: CPT

## 2022-12-22 PROCEDURE — 93010 ELECTROCARDIOGRAM REPORT: CPT

## 2022-12-22 PROCEDURE — 86901 BLOOD TYPING SEROLOGIC RH(D): CPT

## 2022-12-22 PROCEDURE — 93005 ELECTROCARDIOGRAM TRACING: CPT

## 2022-12-22 PROCEDURE — 85384 FIBRINOGEN ACTIVITY: CPT

## 2022-12-22 PROCEDURE — 87491 CHLMYD TRACH DNA AMP PROBE: CPT

## 2022-12-22 PROCEDURE — U0003: CPT

## 2022-12-22 PROCEDURE — 84550 ASSAY OF BLOOD/URIC ACID: CPT

## 2022-12-22 PROCEDURE — 86703 HIV-1/HIV-2 1 RESULT ANTBDY: CPT

## 2022-12-22 PROCEDURE — 81001 URINALYSIS AUTO W/SCOPE: CPT

## 2022-12-22 PROCEDURE — 87591 N.GONORRHOEAE DNA AMP PROB: CPT

## 2022-12-22 PROCEDURE — 85730 THROMBOPLASTIN TIME PARTIAL: CPT

## 2022-12-22 PROCEDURE — 86900 BLOOD TYPING SEROLOGIC ABO: CPT

## 2022-12-22 PROCEDURE — U0005: CPT

## 2022-12-22 NOTE — PROGRESS NOTE ADULT - SUBJECTIVE AND OBJECTIVE BOX
HD # 3  Patient resting comfortably in NAD  Undergoing an NST in LD  Reactive  Good Variability  No contractions  Cx Not examined   patient received second dose of fetal lung maturation tx  Awaiting MFM clearance for DC  F/U office one week    WBC 15.69  H / H  11.4 / 34.3  Platel  195  
HD # 2    Patient resting comfortably in NAD  Patient still c/o of uterine contractions  " same as before "  Afebrile  VSS  Abdomen  soft  Not tender  Vagina  No Blood / fluid   Extrem  No Homans   FH ++  FM ++  NST reactive  Good variability   contraction 2-4 min when able to be monitored and reordered ,, Prolonged monitor with No uterine activity,, but patient reports contraction during those periods   CX not examined since admission  Received first dose of steroids for fetal lung maturation Tx    WBC  14.09  H / H 12.1 / 36.3  Platel  177    Monitor  Second dose of steroids  MFM consult  Consider po tocolysis     Patient had been seen and evaluated yesterday evening prior to initiating Tx   
26yo  at 33w who presents from the office for lateral back pain that wraps around her abdomen. Patient was admitted for concern of  labor and started on betamethasone course.     Subjective:  Patient stated that she is doing well in terms of abdominal pain or cramping. She denies leakage of fluid or vaginal bleeding. She stated that she is feeling her heart racing and reported feeling chills overnight.     Vital Signs Last 24 Hrs  T(C): 37 (22 Dec 2022 08:22), Max: 37 (22 Dec 2022 05:00)  T(F): 98.6 (22 Dec 2022 08:22), Max: 98.6 (22 Dec 2022 05:00)  HR: 106 (22 Dec 2022 08:50) (105 - 120)  BP: 100/58 (22 Dec 2022 08:50) (100/58 - 129/69)  BP(mean): --  RR: 16 (22 Dec 2022 08:22) (16 - 18)  SpO2: 96% (22 Dec 2022 08:22) (96% - 99%)    Parameters below as of 22 Dec 2022 08:22  Patient On (Oxygen Delivery Method): room air    General: Alert and oriented x3, no acute distress  Cardiovascular: regular rate and rhythm, no murmurs, rubs or gallops appreciated on exam  Respiratory: clear to auscultation bilaterally  Abdominal: gravid uterus, non-tender to palpation  Pelvic: 0/0/-3 @ 0700 (22)  Extremities: no redness, tenderness or swelling in lower extremities bilaterally     FHT: baseline 150 moderate variability, +accels, -deccels  [place]:  no contractions, mild irritability

## 2022-12-22 NOTE — PROGRESS NOTE ADULT - PROBLEM SELECTOR PLAN 4
SCDs while note ambulating, will start heparin for DVT prophylaxis if patient is admitted for 3 days.

## 2022-12-22 NOTE — PROGRESS NOTE ADULT - PROBLEM SELECTOR PLAN 2
Patient with a history of SVT s/p ablation. Currently not on any medications, reported having a fast heartbeat, likely related to betamethasone course. However, patient also reported chills. Respiratory viral panel negative and patient remains afebrile. Patient with a history of SVT s/p ablation. Currently not on any medications, reported having a fast heartbeat, likely related to betamethasone course. However, patient also reported chills. Respiratory viral panel negative and patient remains afebrile. EKG performed- wnl

## 2022-12-22 NOTE — PROGRESS NOTE ADULT - ASSESSMENT
26yo  at 33w who presents from the office for lateral back pain that wraps around her abdomen. Patient was admitted for concern of  labor and started on betamethasone course.

## 2022-12-22 NOTE — PROGRESS NOTE ADULT - PROBLEM SELECTOR PLAN 1
Patient presented with  contractions, however has not had any cervical change during her admission. Cervical length 3.7 cm. Patient re-examined this AM and cervix remains unchanged. Patient treated with betamethasone course per Dr. Santillan.

## 2022-12-22 NOTE — PROGRESS NOTE ADULT - ATTENDING COMMENTS
MFM (late entry) - IUP at 33 weeks admitted with  contractions.  No overnight events.  FHRT reactive with irritability.  Repeat VE unchanged.  Patient has completed betamethasone.  Candidate for outpatient management.   labor precautions and routine instructions reviewed.  Patient to follow up with primary OB within one week.  All questions answered.   Cherie Massey MD  Maternal Fetal Medicine

## 2022-12-22 NOTE — PROGRESS NOTE ADULT - PROVIDER SPECIALTY LIST ADULT
OB
OB
MFRIGOBERTO
Detail Level: Detailed
Render Risk Assessment In Note?: no
Comment: No melasma noted on exam today, also discussed isotretinoin therapy if no improvement in acne. Patient states she took spironolactone in the past which caused hypotension. Patient will followup with her gynecologist for discussion about oral BCPs

## 2022-12-27 ENCOUNTER — APPOINTMENT (OUTPATIENT)
Dept: OBGYN | Facility: CLINIC | Age: 27
End: 2022-12-27

## 2022-12-27 ENCOUNTER — NON-APPOINTMENT (OUTPATIENT)
Age: 27
End: 2022-12-27

## 2022-12-27 VITALS
HEART RATE: 112 BPM | DIASTOLIC BLOOD PRESSURE: 82 MMHG | WEIGHT: 187 LBS | BODY MASS INDEX: 33.13 KG/M2 | SYSTOLIC BLOOD PRESSURE: 121 MMHG

## 2022-12-27 LAB
BILIRUB UR QL STRIP: NORMAL
GLUCOSE UR-MCNC: NORMAL
HCG UR QL: 0.2 EU/DL
HGB UR QL STRIP.AUTO: NORMAL
KETONES UR-MCNC: NORMAL
LEUKOCYTE ESTERASE UR QL STRIP: NORMAL
NITRITE UR QL STRIP: NORMAL
PH UR STRIP: 7
PROT UR STRIP-MCNC: NORMAL
SP GR UR STRIP: 1.02

## 2022-12-27 PROCEDURE — 0502F SUBSEQUENT PRENATAL CARE: CPT

## 2022-12-27 PROCEDURE — 81002 URINALYSIS NONAUTO W/O SCOPE: CPT

## 2022-12-28 LAB
APPEARANCE: CLEAR
BILIRUBIN URINE: NEGATIVE
BLOOD URINE: NEGATIVE
COLOR: NORMAL
GLUCOSE QUALITATIVE U: NEGATIVE
KETONES URINE: NEGATIVE
LEUKOCYTE ESTERASE URINE: ABNORMAL
NITRITE URINE: NEGATIVE
PH URINE: 6.5
PROTEIN URINE: NEGATIVE
SPECIFIC GRAVITY URINE: 1.02
UROBILINOGEN URINE: NORMAL

## 2022-12-29 LAB — BACTERIA UR CULT: NORMAL

## 2022-12-30 ENCOUNTER — NON-APPOINTMENT (OUTPATIENT)
Age: 27
End: 2022-12-30

## 2023-01-03 ENCOUNTER — APPOINTMENT (OUTPATIENT)
Dept: OBGYN | Facility: CLINIC | Age: 28
End: 2023-01-03
Payer: COMMERCIAL

## 2023-01-03 VITALS
HEART RATE: 103 BPM | DIASTOLIC BLOOD PRESSURE: 87 MMHG | BODY MASS INDEX: 32.77 KG/M2 | WEIGHT: 185 LBS | SYSTOLIC BLOOD PRESSURE: 147 MMHG

## 2023-01-03 LAB
BILIRUB UR QL STRIP: NORMAL
GLUCOSE UR-MCNC: NORMAL
HCG UR QL: 0.2 EU/DL
HGB UR QL STRIP.AUTO: NORMAL
KETONES UR-MCNC: NORMAL
LEUKOCYTE ESTERASE UR QL STRIP: NORMAL
NITRITE UR QL STRIP: NORMAL
PH UR STRIP: 7
PROT UR STRIP-MCNC: NORMAL
SP GR UR STRIP: 1.01

## 2023-01-03 PROCEDURE — 81002 URINALYSIS NONAUTO W/O SCOPE: CPT

## 2023-01-03 PROCEDURE — 0502F SUBSEQUENT PRENATAL CARE: CPT

## 2023-01-05 LAB — BACTERIA UR CULT: NORMAL

## 2023-01-09 ENCOUNTER — INPATIENT (INPATIENT)
Facility: HOSPITAL | Age: 28
LOS: 1 days | Discharge: HOME | DRG: 832 | End: 2023-01-11
Attending: OBSTETRICS & GYNECOLOGY | Admitting: OBSTETRICS & GYNECOLOGY
Payer: COMMERCIAL

## 2023-01-09 VITALS — DIASTOLIC BLOOD PRESSURE: 82 MMHG | SYSTOLIC BLOOD PRESSURE: 143 MMHG | HEART RATE: 141 BPM

## 2023-01-09 DIAGNOSIS — Z98.890 OTHER SPECIFIED POSTPROCEDURAL STATES: Chronic | ICD-10-CM

## 2023-01-09 DIAGNOSIS — O47.03 FALSE LABOR BEFORE 37 COMPLETED WEEKS OF GESTATION, THIRD TRIMESTER: ICD-10-CM

## 2023-01-09 LAB
ALBUMIN SERPL ELPH-MCNC: 3.3 G/DL — SIGNIFICANT CHANGE UP (ref 3.3–5.2)
ALP SERPL-CCNC: 145 U/L — HIGH (ref 40–120)
ALT FLD-CCNC: 14 U/L — SIGNIFICANT CHANGE UP
ANION GAP SERPL CALC-SCNC: 12 MMOL/L — SIGNIFICANT CHANGE UP (ref 5–17)
APPEARANCE UR: CLEAR — SIGNIFICANT CHANGE UP
APTT BLD: 23.8 SEC — LOW (ref 27.5–35.5)
AST SERPL-CCNC: 29 U/L — SIGNIFICANT CHANGE UP
BASOPHILS # BLD AUTO: 0.02 K/UL — SIGNIFICANT CHANGE UP (ref 0–0.2)
BASOPHILS NFR BLD AUTO: 0.2 % — SIGNIFICANT CHANGE UP (ref 0–2)
BILIRUB SERPL-MCNC: <0.2 MG/DL — LOW (ref 0.4–2)
BILIRUB UR-MCNC: NEGATIVE — SIGNIFICANT CHANGE UP
BUN SERPL-MCNC: 5.6 MG/DL — LOW (ref 8–20)
CALCIUM SERPL-MCNC: 8.9 MG/DL — SIGNIFICANT CHANGE UP (ref 8.4–10.5)
CHLORIDE SERPL-SCNC: 104 MMOL/L — SIGNIFICANT CHANGE UP (ref 96–108)
CO2 SERPL-SCNC: 21 MMOL/L — LOW (ref 22–29)
COLOR SPEC: YELLOW — SIGNIFICANT CHANGE UP
CREAT ?TM UR-MCNC: 48 MG/DL — SIGNIFICANT CHANGE UP
CREAT SERPL-MCNC: 0.52 MG/DL — SIGNIFICANT CHANGE UP (ref 0.5–1.3)
DIFF PNL FLD: NEGATIVE — SIGNIFICANT CHANGE UP
EGFR: 131 ML/MIN/1.73M2 — SIGNIFICANT CHANGE UP
EOSINOPHIL # BLD AUTO: 0.07 K/UL — SIGNIFICANT CHANGE UP (ref 0–0.5)
EOSINOPHIL NFR BLD AUTO: 0.7 % — SIGNIFICANT CHANGE UP (ref 0–6)
EPI CELLS # UR: SIGNIFICANT CHANGE UP
FIBRINOGEN PPP-MCNC: 940 MG/DL — CRITICAL HIGH (ref 330–520)
GLUCOSE SERPL-MCNC: 166 MG/DL — HIGH (ref 70–99)
GLUCOSE UR QL: 100 MG/DL
HCT VFR BLD CALC: 36.3 % — SIGNIFICANT CHANGE UP (ref 34.5–45)
HGB BLD-MCNC: 12.2 G/DL — SIGNIFICANT CHANGE UP (ref 11.5–15.5)
IMM GRANULOCYTES NFR BLD AUTO: 0.3 % — SIGNIFICANT CHANGE UP (ref 0–0.9)
INR BLD: 0.91 RATIO — SIGNIFICANT CHANGE UP (ref 0.88–1.16)
KETONES UR-MCNC: NEGATIVE — SIGNIFICANT CHANGE UP
LDH SERPL L TO P-CCNC: 300 U/L — HIGH (ref 98–192)
LEUKOCYTE ESTERASE UR-ACNC: ABNORMAL
LYMPHOCYTES # BLD AUTO: 1.81 K/UL — SIGNIFICANT CHANGE UP (ref 1–3.3)
LYMPHOCYTES # BLD AUTO: 18.9 % — SIGNIFICANT CHANGE UP (ref 13–44)
MCHC RBC-ENTMCNC: 30.5 PG — SIGNIFICANT CHANGE UP (ref 27–34)
MCHC RBC-ENTMCNC: 33.6 GM/DL — SIGNIFICANT CHANGE UP (ref 32–36)
MCV RBC AUTO: 90.8 FL — SIGNIFICANT CHANGE UP (ref 80–100)
MONOCYTES # BLD AUTO: 0.54 K/UL — SIGNIFICANT CHANGE UP (ref 0–0.9)
MONOCYTES NFR BLD AUTO: 5.6 % — SIGNIFICANT CHANGE UP (ref 2–14)
NEUTROPHILS # BLD AUTO: 7.11 K/UL — SIGNIFICANT CHANGE UP (ref 1.8–7.4)
NEUTROPHILS NFR BLD AUTO: 74.3 % — SIGNIFICANT CHANGE UP (ref 43–77)
NITRITE UR-MCNC: NEGATIVE — SIGNIFICANT CHANGE UP
PH UR: 7 — SIGNIFICANT CHANGE UP (ref 5–8)
PLATELET # BLD AUTO: 173 K/UL — SIGNIFICANT CHANGE UP (ref 150–400)
POTASSIUM SERPL-MCNC: 4.1 MMOL/L — SIGNIFICANT CHANGE UP (ref 3.5–5.3)
POTASSIUM SERPL-SCNC: 4.1 MMOL/L — SIGNIFICANT CHANGE UP (ref 3.5–5.3)
PROT ?TM UR-MCNC: 6 MG/DL — SIGNIFICANT CHANGE UP (ref 0–12)
PROT SERPL-MCNC: 6.7 G/DL — SIGNIFICANT CHANGE UP (ref 6.6–8.7)
PROT UR-MCNC: NEGATIVE — SIGNIFICANT CHANGE UP
PROT/CREAT UR-RTO: 0.1 RATIO — SIGNIFICANT CHANGE UP
PROTHROM AB SERPL-ACNC: 10.5 SEC — SIGNIFICANT CHANGE UP (ref 10.5–13.4)
RBC # BLD: 4 M/UL — SIGNIFICANT CHANGE UP (ref 3.8–5.2)
RBC # FLD: 13.2 % — SIGNIFICANT CHANGE UP (ref 10.3–14.5)
RBC CASTS # UR COMP ASSIST: SIGNIFICANT CHANGE UP /HPF (ref 0–4)
SODIUM SERPL-SCNC: 137 MMOL/L — SIGNIFICANT CHANGE UP (ref 135–145)
SP GR SPEC: 1.01 — SIGNIFICANT CHANGE UP (ref 1.01–1.02)
TSH SERPL-MCNC: 1.44 UIU/ML — SIGNIFICANT CHANGE UP (ref 0.27–4.2)
URATE SERPL-MCNC: 3.3 MG/DL — SIGNIFICANT CHANGE UP (ref 2.4–5.7)
UROBILINOGEN FLD QL: NEGATIVE MG/DL — SIGNIFICANT CHANGE UP
WBC # BLD: 9.58 K/UL — SIGNIFICANT CHANGE UP (ref 3.8–10.5)
WBC # FLD AUTO: 9.58 K/UL — SIGNIFICANT CHANGE UP (ref 3.8–10.5)
WBC UR QL: ABNORMAL /HPF (ref 0–5)

## 2023-01-09 PROCEDURE — 93010 ELECTROCARDIOGRAM REPORT: CPT

## 2023-01-09 RX ORDER — ACETAMINOPHEN 500 MG
975 TABLET ORAL ONCE
Refills: 0 | Status: DISCONTINUED | OUTPATIENT
Start: 2023-01-09 | End: 2023-01-11

## 2023-01-09 NOTE — OB PROVIDER TRIAGE NOTE - ATTENDING COMMENTS
27y  at 35w4d GA who presents to L&D for from her prenatal appointment due to elevated BPs of 140s/80s but not with persistent tachycardia thought to be due to possible recurrent SVT. BP well controlled, asymptomatic tachycardia, will observe as per cardiology and await cardiac echocardiogram. Reassuring FHT. no s/s  labor.

## 2023-01-09 NOTE — OB PROVIDER TRIAGE NOTE - NSOBPROVIDERNOTE_OBGYN_ALL_OB_FT
MELANI GRISSOM is a 27y  at 35w4d GA who presents to L&D for from her prenatal appointment due to elevated BPs. Patient was evaluated for pre-eclampsia and was found to have tachycardia.   A/P:     -BPs 140s/80s upon arrival. Now 110s-120s/60s-70.  -Patient was found to have tachycardia in the 120s-130s.  -PIH labs wnl  -EKG Shows sinus tachycardia  -Will consult MFM in the setting of a persistent tachycardia and SOB.    Fetus: NST reactive  [place]: no contractions  Dispo: Continue to observe.     Discussed with Dr. Flor

## 2023-01-09 NOTE — OB PROVIDER TRIAGE NOTE - HISTORY OF PRESENT ILLNESS
MELANI GRISSOM is a 27y  at 35w4d GA who presents to L&D for from her prenatal appointment due to elevated BPs of 140s/80s. Patient also reports a headache that started early today. She has not taken any medications for the headache. She reports SOB and palpitations that started today around 2:00PM. She has a history of SVT s/p cardiac ablation in 2017. She does not currently follow with a cardiologist. Pt denies visual disturbances, RUQ pain, epigastric pain and new-onset edema. Pt denies vaginal bleeding, contractions and leakage of fluid. She endorses good fetal movement. She denies any urinary complaints. She denies fevers, chills, nausea, vomiting.     Pregnancy course is significant for:  Syncopal episodes in pregnancy    POB: denies  PGYN: -fibroids/-cysts, denied STD hx, denies abnormal PAPs  PMH: Hx of SVT and hypertension in 2017, chondromalacia of left knee, Hx of spinal meningitis at 2weeks old  PSH: cardiac ablation (2017), left ankle surgery ()  SH: Denies tobacco use, EtOH use and illicit drug use during the pregnancy; Feels safe at home  Meds: PNVs  All: NKDA

## 2023-01-09 NOTE — OB RN TRIAGE NOTE - FALL HARM RISK - PT AGE POPULATION HIDDEN
After Visit Summary   6/1/2018    Thomas Quiroz Jr.    MRN: [id number]           Patient Information     Date Of Birth          2015        Visit Information        Provider Department      6/1/2018 3:40 PM Josy Lopez APRN CNP [facility]        Today's Diagnoses     Acute streptococcal pharyngitis    -  1      Care Instructions    At [facility], we strive to deliver an exceptional experience to you, every time we see you.  If you receive a survey in the mail, please send us back your thoughts. We really do value your feedback.    Based on your medical history, these are the current health maintenance/preventive care services that you are due for (some may have been done at this visit.)  There are no preventive care reminders to display for this patient.    Suggested websites for health information:  Www.[employer] : Up to date and easily searchable information on multiple topics.  Www.[employer].gov : medication info, interactive tutorials, watch real surgeries online  Www.familydoctor.org : good info from the Academy of Family Physicians  Www.cdc.gov : public health info, travel advisories, epidemics (H1N1)  Www.aap.org : children's health info, normal development, vaccinations  Www.health.[facility].us : [place] dept of health, public health issues in [place], N1N1    Your care team:                            Family Medicine Internal Medicine   MD Ariel Segal MD Shantel Branch-Fleming, MD Katya Georgiev PA-C Megan Hill, APRN CNP Nam Ho, MD Pediatrics   LAURIE Cavazos, MD Josy Banegas CNP, MD Bethany Templen, MD Deborah Mielke, MD Kim Thein, APRN CNP      Clinic hours: Monday - Thursday 7 am-7 pm; Fridays 7 am-5 pm.   Urgent care: Monday - Friday 11 am-9 pm; Saturday and Sunday 9 am-5 pm.  Pharmacy : Monday -Thursday 8 am-8 pm; Friday 8 am-6 pm; Saturday and  Sunday 9 am-5 pm.     Clinic: [phone]   Pharmacy: [phone]                Follow-ups after your visit        Who to contact     If you have questions or need follow up information about today's clinic visit or your schedule please contact [facility] directly at [phone].  Normal or non-critical lab and imaging results will be communicated to you by [email or web address], letter or phone within 4 business days after the clinic has received the results. If you do not hear from us within 7 days, please contact the clinic through [email or web address] or phone. If you have a critical or abnormal lab result, we will notify you by phone as soon as possible.  Submit refill requests through Cloak or call your pharmacy and they will forward the refill request to us. Please allow 3 business days for your refill to be completed.          Additional Information About Your Visit        [email or web address] Information     Cloak lets you send messages to your doctor, view your test results, renew your prescriptions, schedule appointments and more. To sign up, go to [email or web address], contact your [facility] clinic or call [phone] during business hours.            Care EveryWhere ID     This is your Care EveryWhere ID. This could be used by other organizations to access your [facility] medical records  [id number]        Your Vitals Were     Pulse Temperature Pulse Oximetry             134 100.7  F (38.2  C) (Tympanic) 100%          Blood Pressure from Last 3 Encounters:   05/16/18 92/61   05/08/18 98/65   03/08/18 100/73    Weight from Last 3 Encounters:   06/01/18 32 lb (14.5 kg) (42 %)*   05/16/18 32 lb 4.8 oz (14.7 kg) (47 %)*   05/08/18 31 lb 8 oz (14.3 kg) (39 %)*     * Growth percentiles are based on CDC 2-20 Years data.              We Performed the Following     Rapid strep screen          Today's Medication Changes          These changes are accurate as of 6/1/18  4:29 PM.  If you have any questions, ask your  nurse or doctor.               Start taking these medicines.        Dose/Directions    amoxicillin 400 MG/5ML suspension   Commonly known as:  AMOXIL   Used for:  Acute streptococcal pharyngitis   Started by:  Josy Lopez APRN CNP        Dose:  50 mg/kg/day   Take 4.6 mLs (368 mg) by mouth 2 times daily for 10 days   Quantity:  92 mL   Refills:  0            Where to get your medicines      These [place] [facility] [place] [address]  [address], [facility] [address]     Phone:  [phone]     amoxicillin 400 MG/5ML suspension                Primary Care Provider Office Phone # Fax #    Rosie Gillespie [phone]       [address] [place] [address]        Equal Access to Services     [facility] CenterJESSICA : Hadii huang carballo hadasho Soomaali, waaxda luqadaha, qaybta kaalmada aderobyada, antionette ibrahim . So [facility] [phone].    ATENCIÓN: Si habla español, tiene a whelan disposición servicios gratuitos de asistencia lingüística. [facility] [phone].    We comply with applicable federal civil rights laws and [place] laws. We do not discriminate on the basis of race, color, national origin, age, disability, sex, sexual orientation, or gender identity.            Thank you!     Thank you for choosing [facility]  for your care. Our goal is always to provide you with excellent care. Hearing back from our patients is one way we can continue to improve our services. Please take a few minutes to complete the written survey that you may receive in the mail after your visit with us. Thank you!             Your Updated Medication List - Protect others around you: Learn how to safely use, store and throw away your medicines at www.disposemymeds.org.          This list is accurate as of 6/1/18  4:29 PM.  Always use your most recent med list.                   Brand Name Dispense Instructions for use  Diagnosis    acetaminophen 160 MG/5ML solution    TYLENOL     Take 15 mg/kg by mouth every 4 hours as needed for fever or mild pain Reported on 4/18/2017        albuterol (2.5 MG/3ML) 0.083% neb solution     180 mL    Take 1 vial (2.5 mg) by nebulization every 6 hours as needed for shortness of breath / dyspnea or wheezing    Peanut allergy, Seasonal allergic rhinitis, unspecified chronicity, unspecified trigger       amoxicillin 400 MG/5ML suspension    AMOXIL    92 mL    Take 4.6 mLs (368 mg) by mouth 2 times daily for 10 days    Acute streptococcal pharyngitis       BENADRYL PO      Reported on 4/18/2017        cetirizine HCl 1 MG/ML Syrp     75 mL    Take 2.5 mg by mouth At Bedtime    Adverse food reaction, initial encounter, Atopic dermatitis, unspecified type, Other allergic rhinitis, Allergic conjunctivitis, bilateral       * EPIPEN JR 2-ISIDORO 0.15 MG/0.3ML injection 2-pack   Generic drug:  EPINEPHrine     0.6 mL    Inject 0.3 mLs (0.15 mg) into the muscle as needed for anaphylaxis Reported on 4/18/2017    Peanut allergy       * EPINEPHrine 0.15 MG/0.15ML injection 2-pack    AUVI-Q    4 mL    Inject 0.15 mLs (0.15 mg) into the muscle as needed for anaphylaxis    Adverse food reaction, initial encounter       fluticasone 50 MCG/ACT spray    FLONASE    1 Bottle    Spray 1 spray into both nostrils daily    Other allergic rhinitis, Allergic conjunctivitis, bilateral       * hydrocortisone 1 % ointment     30 g    Apply to affected area bid for 7 days.    Infantile atopic dermatitis       * hydrocortisone 2.5 % ointment     60 g    Apply to affected area bid for 7 days (once daily to face)    Intrinsic eczema       * Notice:  This list has 4 medication(s) that are the same as other medications prescribed for you. Read the directions carefully, and ask your doctor or other care provider to review them with you.       Adult 22

## 2023-01-09 NOTE — OB PROVIDER TRIAGE NOTE - NSHPPHYSICALEXAM_GEN_ALL_CORE
T(C): 37 (01-09-23 @ 20:08), Max: 37 (01-09-23 @ 20:08)  HR: 116 (01-09-23 @ 23:25) (106 - 141)  BP: 116/61 (01-09-23 @ 23:10) (116/61 - 143/83)  RR: 17 (01-09-23 @ 20:08) (17 - 17)  SpO2: 96% (01-09-23 @ 23:20) (93% - 99%)    Gen: NAD, well-appearing  Abd: soft, gravid  Ext: non-edematous, non-tender     FHT: baseline FHR 140s, moderate variability, +accels, -decels  [place]: no contractions

## 2023-01-10 ENCOUNTER — NON-APPOINTMENT (OUTPATIENT)
Age: 28
End: 2023-01-10

## 2023-01-10 ENCOUNTER — APPOINTMENT (OUTPATIENT)
Dept: OBGYN | Facility: CLINIC | Age: 28
End: 2023-01-10

## 2023-01-10 DIAGNOSIS — Z3A.35 35 WEEKS GESTATION OF PREGNANCY: ICD-10-CM

## 2023-01-10 DIAGNOSIS — E03.9 HYPOTHYROIDISM, UNSPECIFIED: ICD-10-CM

## 2023-01-10 DIAGNOSIS — Z00.00 ENCOUNTER FOR GENERAL ADULT MEDICAL EXAMINATION WITHOUT ABNORMAL FINDINGS: ICD-10-CM

## 2023-01-10 DIAGNOSIS — E78.5 HYPERLIPIDEMIA, UNSPECIFIED: ICD-10-CM

## 2023-01-10 DIAGNOSIS — R03.0 ELEVATED BLOOD-PRESSURE READING, WITHOUT DIAGNOSIS OF HYPERTENSION: ICD-10-CM

## 2023-01-10 DIAGNOSIS — R00.0 TACHYCARDIA, UNSPECIFIED: ICD-10-CM

## 2023-01-10 DIAGNOSIS — O99.213 OBESITY COMPLICATING PREGNANCY, THIRD TRIMESTER: ICD-10-CM

## 2023-01-10 DIAGNOSIS — O13.9 GESTATIONAL [PREGNANCY-INDUCED] HYPERTENSION WITHOUT SIGNIFICANT PROTEINURIA, UNSPECIFIED TRIMESTER: ICD-10-CM

## 2023-01-10 DIAGNOSIS — O26.893 OTHER SPECIFIED PREGNANCY RELATED CONDITIONS, THIRD TRIMESTER: ICD-10-CM

## 2023-01-10 LAB
A1C WITH ESTIMATED AVERAGE GLUCOSE RESULT: 5.3 % — SIGNIFICANT CHANGE UP (ref 4–5.6)
ANION GAP SERPL CALC-SCNC: 11 MMOL/L — SIGNIFICANT CHANGE UP (ref 5–17)
BASOPHILS # BLD AUTO: 0.03 K/UL — SIGNIFICANT CHANGE UP (ref 0–0.2)
BASOPHILS NFR BLD AUTO: 0.3 % — SIGNIFICANT CHANGE UP (ref 0–2)
BLD GP AB SCN SERPL QL: SIGNIFICANT CHANGE UP
BUN SERPL-MCNC: 4.6 MG/DL — LOW (ref 8–20)
CALCIUM SERPL-MCNC: 8.3 MG/DL — LOW (ref 8.4–10.5)
CHLORIDE SERPL-SCNC: 108 MMOL/L — SIGNIFICANT CHANGE UP (ref 96–108)
CO2 SERPL-SCNC: 21 MMOL/L — LOW (ref 22–29)
CREAT SERPL-MCNC: 0.43 MG/DL — LOW (ref 0.5–1.3)
EGFR: 137 ML/MIN/1.73M2 — SIGNIFICANT CHANGE UP
EOSINOPHIL # BLD AUTO: 0.07 K/UL — SIGNIFICANT CHANGE UP (ref 0–0.5)
EOSINOPHIL NFR BLD AUTO: 0.8 % — SIGNIFICANT CHANGE UP (ref 0–6)
ESTIMATED AVERAGE GLUCOSE: 105 MG/DL — SIGNIFICANT CHANGE UP (ref 68–114)
GLUCOSE SERPL-MCNC: 80 MG/DL — SIGNIFICANT CHANGE UP (ref 70–99)
HCT VFR BLD CALC: 31.9 % — LOW (ref 34.5–45)
HGB BLD-MCNC: 10.5 G/DL — LOW (ref 11.5–15.5)
IMM GRANULOCYTES NFR BLD AUTO: 0.3 % — SIGNIFICANT CHANGE UP (ref 0–0.9)
LYMPHOCYTES # BLD AUTO: 2.03 K/UL — SIGNIFICANT CHANGE UP (ref 1–3.3)
LYMPHOCYTES # BLD AUTO: 22.5 % — SIGNIFICANT CHANGE UP (ref 13–44)
MAGNESIUM SERPL-MCNC: 1.5 MG/DL — LOW (ref 1.6–2.6)
MCHC RBC-ENTMCNC: 30.4 PG — SIGNIFICANT CHANGE UP (ref 27–34)
MCHC RBC-ENTMCNC: 32.9 GM/DL — SIGNIFICANT CHANGE UP (ref 32–36)
MCV RBC AUTO: 92.5 FL — SIGNIFICANT CHANGE UP (ref 80–100)
MONOCYTES # BLD AUTO: 0.68 K/UL — SIGNIFICANT CHANGE UP (ref 0–0.9)
MONOCYTES NFR BLD AUTO: 7.5 % — SIGNIFICANT CHANGE UP (ref 2–14)
NEUTROPHILS # BLD AUTO: 6.17 K/UL — SIGNIFICANT CHANGE UP (ref 1.8–7.4)
NEUTROPHILS NFR BLD AUTO: 68.6 % — SIGNIFICANT CHANGE UP (ref 43–77)
PHOSPHATE SERPL-MCNC: 3.5 MG/DL — SIGNIFICANT CHANGE UP (ref 2.4–4.7)
PLATELET # BLD AUTO: 132 K/UL — LOW (ref 150–400)
POTASSIUM SERPL-MCNC: 3.8 MMOL/L — SIGNIFICANT CHANGE UP (ref 3.5–5.3)
POTASSIUM SERPL-SCNC: 3.8 MMOL/L — SIGNIFICANT CHANGE UP (ref 3.5–5.3)
RBC # BLD: 3.45 M/UL — LOW (ref 3.8–5.2)
RBC # FLD: 13.4 % — SIGNIFICANT CHANGE UP (ref 10.3–14.5)
SARS-COV-2 RNA SPEC QL NAA+PROBE: SIGNIFICANT CHANGE UP
SODIUM SERPL-SCNC: 140 MMOL/L — SIGNIFICANT CHANGE UP (ref 135–145)
T PALLIDUM AB TITR SER: NEGATIVE — SIGNIFICANT CHANGE UP
T4 FREE SERPL-MCNC: 1.1 NG/DL — SIGNIFICANT CHANGE UP (ref 0.9–1.8)
TROPONIN T SERPL-MCNC: <0.01 NG/ML — SIGNIFICANT CHANGE UP (ref 0–0.06)
WBC # BLD: 9.01 K/UL — SIGNIFICANT CHANGE UP (ref 3.8–10.5)
WBC # FLD AUTO: 9.01 K/UL — SIGNIFICANT CHANGE UP (ref 3.8–10.5)

## 2023-01-10 PROCEDURE — 84100 ASSAY OF PHOSPHORUS: CPT

## 2023-01-10 PROCEDURE — 84156 ASSAY OF PROTEIN URINE: CPT

## 2023-01-10 PROCEDURE — 84550 ASSAY OF BLOOD/URIC ACID: CPT

## 2023-01-10 PROCEDURE — 83735 ASSAY OF MAGNESIUM: CPT

## 2023-01-10 PROCEDURE — 84443 ASSAY THYROID STIM HORMONE: CPT

## 2023-01-10 PROCEDURE — 83615 LACTATE (LD) (LDH) ENZYME: CPT

## 2023-01-10 PROCEDURE — U0003: CPT

## 2023-01-10 PROCEDURE — U0005: CPT

## 2023-01-10 PROCEDURE — 99233 SBSQ HOSP IP/OBS HIGH 50: CPT | Mod: GC

## 2023-01-10 PROCEDURE — 86850 RBC ANTIBODY SCREEN: CPT

## 2023-01-10 PROCEDURE — 84439 ASSAY OF FREE THYROXINE: CPT

## 2023-01-10 PROCEDURE — 82570 ASSAY OF URINE CREATININE: CPT

## 2023-01-10 PROCEDURE — 83036 HEMOGLOBIN GLYCOSYLATED A1C: CPT

## 2023-01-10 PROCEDURE — 80048 BASIC METABOLIC PNL TOTAL CA: CPT

## 2023-01-10 PROCEDURE — 86901 BLOOD TYPING SEROLOGIC RH(D): CPT

## 2023-01-10 PROCEDURE — 93306 TTE W/DOPPLER COMPLETE: CPT | Mod: 26

## 2023-01-10 PROCEDURE — 93306 TTE W/DOPPLER COMPLETE: CPT

## 2023-01-10 PROCEDURE — 85610 PROTHROMBIN TIME: CPT

## 2023-01-10 PROCEDURE — 86900 BLOOD TYPING SEROLOGIC ABO: CPT

## 2023-01-10 PROCEDURE — 99222 1ST HOSP IP/OBS MODERATE 55: CPT | Mod: 25

## 2023-01-10 PROCEDURE — 81001 URINALYSIS AUTO W/SCOPE: CPT

## 2023-01-10 PROCEDURE — 85025 COMPLETE CBC W/AUTO DIFF WBC: CPT

## 2023-01-10 PROCEDURE — 36415 COLL VENOUS BLD VENIPUNCTURE: CPT

## 2023-01-10 PROCEDURE — 84484 ASSAY OF TROPONIN QUANT: CPT

## 2023-01-10 PROCEDURE — 86780 TREPONEMA PALLIDUM: CPT

## 2023-01-10 PROCEDURE — 99222 1ST HOSP IP/OBS MODERATE 55: CPT | Mod: GC

## 2023-01-10 PROCEDURE — 85730 THROMBOPLASTIN TIME PARTIAL: CPT

## 2023-01-10 PROCEDURE — 85384 FIBRINOGEN ACTIVITY: CPT

## 2023-01-10 PROCEDURE — 80053 COMPREHEN METABOLIC PANEL: CPT

## 2023-01-10 PROCEDURE — 93005 ELECTROCARDIOGRAM TRACING: CPT

## 2023-01-10 RX ORDER — SODIUM CHLORIDE 9 MG/ML
500 INJECTION, SOLUTION INTRAVENOUS ONCE
Refills: 0 | Status: COMPLETED | OUTPATIENT
Start: 2023-01-10 | End: 2023-01-10

## 2023-01-10 RX ORDER — MAGNESIUM SULFATE 500 MG/ML
2 VIAL (ML) INJECTION ONCE
Refills: 0 | Status: COMPLETED | OUTPATIENT
Start: 2023-01-10 | End: 2023-01-10

## 2023-01-10 RX ADMIN — SODIUM CHLORIDE 1000 MILLILITER(S): 9 INJECTION, SOLUTION INTRAVENOUS at 05:28

## 2023-01-10 RX ADMIN — Medication 25 GRAM(S): at 15:34

## 2023-01-10 RX ADMIN — Medication 1 TABLET(S): at 09:18

## 2023-01-10 NOTE — CONSULT NOTE ADULT - ASSESSMENT
A/P: Patient is a 27y  at 35w4d GA who is being consulted for sustained sinus tachycardia.  -Continuous pulse ox   -Hgb 12.2  -No electrolyte abnormalities on BMP   -TSH WNL in 2022  -If patient O2 saturation is below 94%, consider CT angio to rule out PE   -EKG with sinus rhythm without evidence of right ventricular heart strain   -Given patient's cardiac history, recommend cardiology consult     Discussed with Dr. Chavarria and Dr. Batista.  A/P: Patient is a 27y  at 35w4d GA who is being admitted for work up of sustained maternal sinus tachycardia.    Discussed with Dr. Chavarria and Dr. Batista.

## 2023-01-10 NOTE — OB RN PATIENT PROFILE - PRO PRENATAL LABS ORI SOURCE HIV
Total Volume (Ccs): 0.1 Kenalog Preparation: Kenalog Administered By (Optional): Mariangel KRUSE Consent: The risks of atrophy were reviewed with the patient. Detail Level: Detailed X Size Of Lesion In Cm (Optional): 0 Concentration Of Kenalog Solution Injected (Mg/Ml): 0.3 Include Z78.9 (Other Specified Conditions Influencing Health Status) As An Associated Diagnosis?: No Medical Necessity Clause: This procedure was medically necessary because the lesions that were treated were: hard copy, drawn during this pregnancy

## 2023-01-10 NOTE — OB PROVIDER H&P - ATTENDING COMMENTS
27y  at 35w4d GA who presents to L&D for from her prenatal appointment due to elevated BPs of 140s/80s but not with persistent tachycardia thought to be due to possible recurrent SVT. BP well controlled, asymptomatic tachycardia, will observe as per cardiology and await cardiac echocardiogram. Consent for care signed after questions answered.

## 2023-01-10 NOTE — PROGRESS NOTE ADULT - PROBLEM SELECTOR PLAN 6
- SCDs while in bed  - T&S q3 days  - subcutaneous heparin on HD#3  - NST qshift   - DASH diet  - continue with daily pnv  - ACS for fetal lung maturity not indicated currently as  delivery is not anticipated - pt reports history of hyperlipidemia  - not currently on medications, controlled with diet  - lipid panel fasting pending per cards rec  - continue with DASH diet

## 2023-01-10 NOTE — CONSULT NOTE ADULT - SUBJECTIVE AND OBJECTIVE BOX
[facility]                                              CARDIOLOGY AT [place] [address]                                             Telephone: [phone]. Fax:[phone]      CARDIOLOGY CONSULTATION NOTE                                                                                             History obtained by: Patient and medical record  Community Cardiologist: None   obtained: No   Reason for Consultation: Sinus tachycardia     Chief complaint:   Patient is a 27y old  Female who presents with a chief complaint of elevated BPs of 140s/80s.     HPI: 28 yo F  at 35w4d GA with PMHx of HLD, hypothyroidism, syncopal episodes in pregnancy, SVT and a A-fib (sp ablation by Dr Rosales, at [facility] 2018) who presents to L&D for from her prenatal appointment due to elevated BPs of 140s/80s. Patient also reports a headache that started early today. She has not taken any medications for the headache. She reports SOB and palpitations that started today around 2:00PM. She does not currently follow with a cardiologist. Pt denies visual disturbances, RUQ pain, epigastric pain and new-onset edema. Pt denies vaginal bleeding, contractions and leakage of fluid. She endorses good fetal movement. She denies any urinary complaints. She denies fevers, chills, nausea, vomiting.     CARDIAC TESTING     PAST MEDICAL HISTORY  H/O hypercholesterolemia  Hx of viral meningitis  Hypothyroid  Atrial fibrillation    PAST SURGICAL HISTORY  H/O prior ablation treatment    SOCIAL HISTORY: Denies smoking/alcohol/drugs    FAMILY HISTORY:  HTN Father     Family History of Cardiovascular Disease:  Yes   Coronary Artery Disease in first degree relative: No  Sudden Cardiac Death in First degree relative: No     HOME MEDICATIONS:   Prenatals     CURRENT CARDIAC MEDICATIONS:     CURRENT OTHER MEDICATIONS:   acetaminophen     Tablet .. 975 milliGRAM(s) Oral once, Stop order after: 1 Doses  prenatal multivitamin 1 Tablet(s) Oral daily    ALLERGIES:   No Known Allergies    REVIEW OF SYMPTOMS:   CONSTITUTIONAL: No fever, no chills, no weight loss, + normal weight gain, no fatigue   ENMT:  No vertigo; No sinus or throat pain  NECK: No pain or stiffness  CARDIOVASCULAR: No chest pain, + dyspnea, no syncope/presyncope, no fatigue, + palpitations, no dizziness, no Orthopnea, no Paroxsymal nocturnal dyspnea  RESPIRATORY:  no cough, no wheezing  : No dysuria, no hematuria   GI: no nausea, no diarrhea, no constipation, no abdominal pain.  NEURO: No headache, no slurred speech   MUSCULOSKELETAL: No joint pain or swelling; No muscle, back, or extremity pain  PSYCH: No agitation, no anxiety.    ALL OTHER REVIEW OF SYSTEMS ARE NEGATIVE.    VITAL SIGNS:   T(C): 37 (23 @ 20:08), Max: 37 (23 @ 20:08)  T(F): 98.6 (23 @ 20:08), Max: 98.6 (23 @ 20:08)  HR: 87 (01-10-23 @ 03:34) (86 - 141)  BP: 129/65 (01-10-23 @ 03:16) (99/54 - 143/83)  RR: 17 (23 @ 20:08) (17 - 17)  SpO2: 96% (01-10-23 @ 03:34) (91% - 99%)    PHYSICAL EXAM:   Constitutional: Comfortable . No acute distress.   HEENT: Atraumatic and normocephalic , neck is supple . no JVD.   CNS: A&Ox3. No focal deficits.   Respiratory: CTAB, unlabored. No wheezing, No crackles or rhonchi   Cardiovascular: RRR normal s1 s2. No murmur. No rubs or gallop.  Gastrointestinal: No exam performed  Extremities: 2+ Peripheral Pulses, No edema  Psychiatric: Calm . no agitation.   Skin: Warm and dry    LABS:                         12.2   9.58  )-----------( 173      ( 2023 21:22 )             36.3         137  |  104  |  5.6<L>  ----------------------------<  166<H>  4.1   |  21.0<L>  |  0.52    Ca    8.9      2023 21:22    TPro  6.7  /  Alb  3.3  /  TBili  <0.2<L>  /  DBili  x   /  AST  29  /  ALT  14  /  AlkPhos  145<H>      PT/INR - ( 2023 21:22 )   PT: 10.5 sec;   INR: 0.91 ratio      PTT - ( 2023 21:22 )  PTT:23.8 sec    Urinalysis Basic - ( 2023 21:22 )  Color: Yellow / Appearance: Clear / S.010 / pH: x  Gluc: x / Ketone: Negative  / Bili: Negative / Urobili: Negative mg/dL   Blood: x / Protein: Negative / Nitrite: Negative   Leuk Esterase: Moderate / RBC: 0-2 /HPF / WBC 6-10 /HPF   Sq Epi: x / Non Sq Epi: Few / Bacteria: x    Thyroid Stimulating Hormone, Serum: 1.44 uIU/mL (23 @ 21:22)    ECG:   Prior ECG: Yes     RADIOLOGY & ADDITIONAL STUDIES:       Preliminary evaluation, please await official recommendations     Assessment and recommendations are final when note is signed by the attending.                                      [facility]                                              CARDIOLOGY AT [place] [address]                                             Telephone: [phone]. Fax:[phone]      CARDIOLOGY CONSULTATION NOTE                                                                                             History obtained by: Patient and medical record  Community Cardiologist: None   obtained: No   Reason for Consultation: Sinus tachycardia     Chief complaint:   Patient is a 27y old  Female who presents with a chief complaint of elevated BPs of 140s/80s.     HPI: 26 yo F  at 35w4d GA with PMHx of HLD, hypothyroidism, syncopal episodes in pregnancy, SVT and a A-fib (sp ablation by Dr Rosales, at [facility] 2018) who presents to L&D for from her prenatal appointment due to elevated BPs of 140s/80s. Patient also reports a headache that started early today. She has not taken any medications for the headache. She reports SOB and palpitations that started today around 2:00PM. She does not currently follow with a cardiologist. Pt denies visual disturbances, RUQ pain, epigastric pain and new-onset edema. Pt denies vaginal bleeding, contractions and leakage of fluid. She endorses good fetal movement. She denies any urinary complaints. She denies fevers, chills, nausea, vomiting.     CARDIAC TESTING     PAST MEDICAL HISTORY  H/O hypercholesterolemia  Hx of viral meningitis  Hypothyroid  Atrial fibrillation    PAST SURGICAL HISTORY  H/O prior ablation treatment    SOCIAL HISTORY: Denies smoking/alcohol/drugs    FAMILY HISTORY:  HTN Father     Family History of Cardiovascular Disease:  Yes   Coronary Artery Disease in first degree relative: No  Sudden Cardiac Death in First degree relative: No     HOME MEDICATIONS:   Prenatals     CURRENT CARDIAC MEDICATIONS:     CURRENT OTHER MEDICATIONS:   acetaminophen     Tablet .. 975 milliGRAM(s) Oral once, Stop order after: 1 Doses  prenatal multivitamin 1 Tablet(s) Oral daily    ALLERGIES:   No Known Allergies    REVIEW OF SYMPTOMS:   CONSTITUTIONAL: No fever, no chills, no weight loss, + normal weight gain, no fatigue   ENMT:  No vertigo; No sinus or throat pain  NECK: No pain or stiffness  CARDIOVASCULAR: No chest pain, + dyspnea, no syncope/presyncope, no fatigue, + palpitations, no dizziness, no Orthopnea, no Paroxsymal nocturnal dyspnea  RESPIRATORY:  no cough, no wheezing  : No dysuria, no hematuria   GI: no nausea, no diarrhea, no constipation, no abdominal pain.  NEURO: No headache, no slurred speech   MUSCULOSKELETAL: No joint pain or swelling; No muscle, back, or extremity pain  PSYCH: No agitation, no anxiety.    ALL OTHER REVIEW OF SYSTEMS ARE NEGATIVE.    VITAL SIGNS:   T(C): 37 (23 @ 20:08), Max: 37 (23 @ 20:08)  T(F): 98.6 (23 @ 20:08), Max: 98.6 (23 @ 20:08)  HR: 87 (01-10-23 @ 03:34) (86 - 141)  BP: 129/65 (01-10-23 @ 03:16) (99/54 - 143/83)  RR: 17 (23 @ 20:08) (17 - 17)  SpO2: 96% (01-10-23 @ 03:34) (91% - 99%)    PHYSICAL EXAM:   Constitutional: Comfortable . No acute distress.   HEENT: Atraumatic and normocephalic , neck is supple . no JVD.   CNS: A&Ox3. No focal deficits.   Respiratory: CTAB, unlabored. No wheezing, No crackles or rhonchi   Cardiovascular: RRR normal s1 s2. No murmur. No rubs or gallop.  Gastrointestinal: No exam performed  Extremities: 2+ Peripheral Pulses, No edema  Psychiatric: Calm . no agitation.   Skin: Warm and dry    LABS:                         12.2   9.58  )-----------( 173      ( 2023 21:22 )             36.3         137  |  104  |  5.6<L>  ----------------------------<  166<H>  4.1   |  21.0<L>  |  0.52    Ca    8.9      2023 21:22    TPro  6.7  /  Alb  3.3  /  TBili  <0.2<L>  /  DBili  x   /  AST  29  /  ALT  14  /  AlkPhos  145<H>      PT/INR - ( 2023 21:22 )   PT: 10.5 sec;   INR: 0.91 ratio      PTT - ( 2023 21:22 )  PTT:23.8 sec    Urinalysis Basic - ( 2023 21:22 )  Color: Yellow / Appearance: Clear / S.010 / pH: x  Gluc: x / Ketone: Negative  / Bili: Negative / Urobili: Negative mg/dL   Blood: x / Protein: Negative / Nitrite: Negative   Leuk Esterase: Moderate / RBC: 0-2 /HPF / WBC 6-10 /HPF   Sq Epi: x / Non Sq Epi: Few / Bacteria: x    Thyroid Stimulating Hormone, Serum: 1.44 uIU/mL (23 @ 21:22)    ECG: Sinus tachycardia (124 BPM). No acute changes  Prior ECG: Yes     RADIOLOGY & ADDITIONAL STUDIES:       Preliminary evaluation, please await official recommendations     Assessment and recommendations are final when note is signed by the attending.

## 2023-01-10 NOTE — CONSULT NOTE ADULT - ASSESSMENT
Patient is a 27y old  Female who presents with a chief complaint of elevated BPs of 140s/80s.     HPI: 28 yo F  at 35w4d GA with PMHx of HLD, hypothyroidism, syncopal episodes in pregnancy, SVT and a A-fib (sp ablation by Dr Rosales, at [facility] 2018) who presents to L&D for from her prenatal appointment due to elevated BPs of 140s/80s. Patient also reports a headache that started early today. She has not taken any medications for the headache. She reports SOB and palpitations that started today around 2:00PM. She does not currently follow with a cardiologist. Pt denies visual disturbances, RUQ pain, epigastric pain and new-onset edema. Pt denies vaginal bleeding, contractions and leakage of fluid. She endorses good fetal movement. She denies any urinary complaints. She denies fevers, chills, nausea, vomiting.  Patient is a 27y old  Female who presents with a chief complaint of elevated BPs of 140s/80s.

## 2023-01-10 NOTE — OB PROVIDER H&P - ASSESSMENT
A/P: Patient is a 27y  at 35w4d GA who is being admitted for work up of sustained maternal sinus tachycardia.    Discussed with Dr. Flor.

## 2023-01-10 NOTE — OB RN PATIENT PROFILE - FALL HARM RISK - UNIVERSAL INTERVENTIONS
Bed in lowest position, wheels locked, appropriate side rails in place/Call bell, personal items and telephone in reach/Instruct patient to call for assistance before getting out of bed or chair/Non-slip footwear when patient is out of bed/[place] to call system/Physically safe environment - no spills, clutter or unnecessary equipment/Purposeful Proactive Rounding/Room/bathroom lighting operational, light cord in reach

## 2023-01-10 NOTE — OB PROVIDER H&P - PROBLEM SELECTOR PLAN 1
-No labor complaints   -Reactive NST, no contractions on toco; continue NST BID   -Last MFM sono (12/22): vertex, anterior placenta, EFW 2268g 77%  -GBS unknown

## 2023-01-10 NOTE — OB PROVIDER H&P - NSHPPHYSICALEXAM_GEN_ALL_CORE
Vital Signs Last 24 Hrs  T(C): 37 (09 Jan 2023 20:08), Max: 37 (09 Jan 2023 20:08)  T(F): 98.6 (09 Jan 2023 20:08), Max: 98.6 (09 Jan 2023 20:08)  HR: 127 (10 Jeromy 2023 00:00) (100 - 141)  BP: 116/61 (09 Jan 2023 23:55) (116/61 - 143/83)  RR: 17 (09 Jan 2023 20:08) (17 - 17)  SpO2: 96% (10 Jeromy 2023 00:00) (93% - 99%)    General: NAD, well-appearing  Heart: RRR  Lungs: CTAB  Abdominal: soft, non-tender, gravid  Ext: non-tender, non-edematous   FHT: baseline 130s, moderate variability, +accels, -decels   Ingenio: no contractions

## 2023-01-10 NOTE — PROGRESS NOTE ADULT - PROBLEM SELECTOR PLAN 5
- pt reports history of hyperlipidemia  - not currently on medications, controlled with diet  - lipid panel fasting pending per cards rec  - continue with DASH diet - history of hypothyroidism  - not currently on medication  - TSH 1.44, free T4 pending

## 2023-01-10 NOTE — PROGRESS NOTE ADULT - SUBJECTIVE AND OBJECTIVE BOX
MELANI GRISSOM  [facility] 4TWR [id number] 01  A 27year old   EDC 23 at 35w5d GA admitted for cardiology workup for sustained maternal sinus tachycardia. States her symptoms are unchanged, and she still feels palpitations and SOB. No Ob complaints this morning, denies loss of fluid, vaginal bleeding, contractions, decreased fetal movement.       Vital Signs:  Vital Signs Last 24 Hrs  T(C): 36.9 (10 Jeromy 2023 04:30), Max: 37 (2023 20:08)  T(F): 98.4 (10 Jeromy 2023 04:30), Max: 98.6 (2023 20:08)  HR: 99 (10 Jeromy 2023 04:30) (83 - 141)  BP: 130/85 (10 Jeromy 2023 04:30) (99/54 - 143/83)  BP(mean): --  RR: 18 (10 Jeromy 2023 04:30) (16 - 18)  SpO2: 98% (10 Jeromy 2023 04:30) (88% - 99%)    Parameters below as of 10 Jeromy 2023 04:30  Patient On (Oxygen Delivery Method): room air    Height (cm): 160 (01-10-23 @ 03:40)  Weight (kg): 83 (01-10-23 @ 03:40)  BMI (kg/m2): 32.4 (01-10-23 @ 03:40)  BSA (m2): 1.86 (01-10-23 @ 03:40)    Physical Exam:  General: Adult female in NAD  Neuro: A&Ox3   Head/Neck: No neck masses, no lymphadenopathy  CVS: RRR, +S1/S2, no murmurs  Lungs: CTAB, no wheezing, rhonchi or rales  Abdomen: soft, non-tender, gravid uterus  Pelvic: Deferred  Ext: No cyanosis, edema or calf tenderness  Skin: No rashes or lesions on exposed skin    Labs:                        12.2   9.58  )-----------( 173      ( 2023 21:22 )             36.3         137  |  104  |  5.6<L>  ----------------------------<  166<H>  4.1   |  21.0<L>  |  0.52    Ca    8.9      2023 21:22    TPro  6.7  /  Alb  3.3  /  TBili  <0.2<L>  /  DBili  x   /  AST  29  /  ALT  14  /  AlkPhos  145<H>      PT/INR - ( 2023 21:22 )   PT: 10.5 sec;   INR: 0.91 ratio         PTT - ( 2023 21:22 )  PTT:23.8 sec    Protein/Creatinine Ratio Calculation: 0.1 Ratio (23 @ 21:22)    MEDICATIONS  (STANDING):  acetaminophen     Tablet .. 975 milliGRAM(s) Oral once  prenatal multivitamin 1 Tablet(s) Oral daily   MELANI GRISSOM  [facility] 4TWR [id number] 01  A 27year old   EDC 23 at 35w5d GA. Pregnancy complicated by history of cardiac arrythmia.  Admitted for cardiology workup for sustained maternal sinus tachycardia. States her symptoms are unchanged, and she still feels palpitations and SOB. History of elevated BP readings as outpatient and on admission. Clinical findings consistent with having gestational hypertension. No Ob complaints this morning, denies loss of fluid, vaginal bleeding, contractions, decreased fetal movement.     Vital Signs:  Vital Signs Last 24 Hrs  T(C): 36.9 (10 Jeromy 2023 04:30), Max: 37 (2023 20:08)  T(F): 98.4 (10 Jeromy 2023 04:30), Max: 98.6 (2023 20:08)  HR: 99 (10 Jeromy 2023 04:30) (83 - 141)  BP: 130/85 (10 Jeromy 2023 04:30) (99/54 - 143/83)  BP(mean): --  RR: 18 (10 Jeromy 2023 04:30) (16 - 18)  SpO2: 98% (10 Jeromy 2023 04:30) (88% - 99%)    Parameters below as of 10 Jeromy 2023 04:30  Patient On (Oxygen Delivery Method): room air    Height (cm): 160 (01-10-23 @ 03:40)  Weight (kg): 83 (01-10-23 @ 03:40)  BMI (kg/m2): 32.4 (01-10-23 @ 03:40)  BSA (m2): 1.86 (01-10-23 @ 03:40)    Physical Exam:  General: Adult female in NAD  Neuro: A&Ox3   Head/Neck: No neck masses, no lymphadenopathy  CVS: RRR, +S1/S2, no murmurs  Lungs: CTAB, no wheezing, rhonchi or rales  Abdomen: soft, non-tender, gravid uterus  Pelvic: Deferred  Ext: No cyanosis, edema or calf tenderness  Skin: No rashes or lesions on exposed skin    Labs:                       12.2   9.58  )-----------( 173      ( 2023 21:22 )             36.3         137  |  104  |  5.6<L>  ----------------------------<  166<H>  4.1   |  21.0<L>  |  0.52    Ca    8.9      2023 21:22    TPro  6.7  /  Alb  3.3  /  TBili  <0.2<L>  /  DBili  x   /  AST  29  /  ALT  14  /  AlkPhos  145<H>      PT/INR - ( 2023 21:22 )   PT: 10.5 sec;   INR: 0.91 ratio       PTT - ( 2023 21:22 )  PTT:23.8 sec    Protein/Creatinine Ratio Calculation: 0.1 Ratio (23 @ 21:22)    MEDICATIONS  (STANDING):  acetaminophen     Tablet .. 975 milliGRAM(s) Oral once  prenatal multivitamin 1 Tablet(s) Oral daily

## 2023-01-10 NOTE — PROGRESS NOTE ADULT - PROBLEM SELECTOR PLAN 4
- history of hypothyroidism  - not currently on medication  - TSH 1.44, free T4 pending - BMI 32.4. Increases risk for stillbirth, hypertension,  deliveries, and operative complications

## 2023-01-10 NOTE — OB PROVIDER H&P - PROBLEM SELECTOR PLAN 2
-Endorses palpitations and SOB   -EKG showing sinus tachycardia, no evidence of right ventricular heart strain   -Hgb 12  -TSH 1.44, Free T4 pending   -Echo in AM   -Cardiology following, recommendations appreciated  -Consider CT Angio if patient become hypoxic -Endorses palpitations and SOB   -EKG showing sinus tachycardia, no evidence of right ventricular heart strain   -Hgb 12  -TSH 1.44, Free T4 pending   -Echo in AM   -Telemetry   -Cardiology following, recommendations appreciated  -Consider CT Angio if patient become hypoxic

## 2023-01-10 NOTE — CONSULT NOTE ADULT - PROBLEM SELECTOR RECOMMENDATION 3
-SCDs while in bed   -Heparin on HD#3  -Regular diet ordered -Headache resolved with Tylenol   -Elevated BPs, not severe range and not within 4 hours  -PIH labs WNL   -P/C 0.1  -Continue to monitor BPs

## 2023-01-10 NOTE — CONSULT NOTE ADULT - SUBJECTIVE AND OBJECTIVE BOX
MELANI GRISSOM is a 27y  at 35w4d GA who presents to L&D for from her prenatal appointment due to elevated BPs of 140s/80s. Patient also reports a headache that started early today. She has not taken any medications for the headache. She reports SOB and palpitations that started today around 2:00PM. She has a history of SVT s/p cardiac ablation in 2017. She does not currently follow with a cardiologist. Pt denies visual disturbances, RUQ pain, epigastric pain and new-onset edema. Pt denies vaginal bleeding, contractions and leakage of fluid. She endorses good fetal movement. She denies any urinary complaints. She denies fevers, chills, nausea, vomiting.     Pregnancy course is significant for:  Syncopal episodes in pregnancy  H/o cardiac ablation s/p SVT     POB: denies  PGYN: -fibroids/-cysts, denied STD hx, denies abnormal PAPs  PMH: Hx of SVT and hypertension in 2017, chondromalacia of left knee, Hx of spinal meningitis at 2weeks old  PSH: cardiac ablation (), left ankle surgery ()  SH: Denies tobacco use, EtOH use and illicit drug use during the pregnancy; Feels safe at home  Meds: PNVs  All: NKDA    Vital Signs Last 24 Hrs  T(C): 37 (2023 20:08), Max: 37 (2023 20:08)  T(F): 98.6 (2023 20:08), Max: 98.6 (2023 20:08)  HR: 127 (10 Jeromy 2023 00:00) (100 - 141)  BP: 116/61 (2023 23:55) (116/61 - 143/83)  RR: 17 (2023 20:08) (17 - 17)  SpO2: 96% (10 Jeromy 2023 00:00) (93% - 99%)    General: NAD, well-appearing  Heart: RRR  Lungs: CTAB  Abdominal: soft, non-tender, gravid  Ext: non-tender, non-edematous   FHT: baseline 130s, moderate variability, +accels, -decels   [place]: no contractions                           12.2   9.58  )-----------( 173      ( 2023 21:22 )             36.3         137  |  104  |  5.6<L>  ----------------------------<  166<H>  4.1   |  21.0<L>  |  0.52    Ca    8.9      2023 21:22    TPro  6.7  /  Alb  3.3  /  TBili  <0.2<L>  /  DBili  x   /  AST  29  /  ALT  14  /  AlkPhos  145<H>      Protein/Creatinine Ratio Calculation: 0.1 Ratio (23 @ 21:22)     MELANI GRISSOM is a 27y  at 35w4d GA who presents to L&D for from her prenatal appointment due to elevated BPs of 140s/80s. Patient also reports a headache that started early today. She has not taken any medications for the headache. She reports SOB and palpitations that started today around 2:00PM. She has a history of SVT s/p cardiac ablation in 2017. She does not currently follow with a cardiologist. Pt denies visual disturbances, RUQ pain, epigastric pain and new-onset edema. Pt denies vaginal bleeding, contractions and leakage of fluid. She endorses good fetal movement. She denies any urinary complaints. She denies fevers, chills, nausea, vomiting.     Pregnancy course is significant for:  1. Syncopal episodes in pregnancy  2. H/o cardiac ablation s/p SVT   3. H/o hypothyroidism   4. S/p ACS - for  contractions     POB: denies  PGYN: -fibroids/-cysts, denied STD hx, denies abnormal PAPs  PMH: Hx of SVT and hypertension in 2017, chondromalacia of left knee, Hx of spinal meningitis at 2weeks old, h/o hypothyroidism   PSH: cardiac ablation (2017), left ankle surgery ()  SH: Denies tobacco use, EtOH use and illicit drug use during the pregnancy; Feels safe at home  Meds: PNVs  All: NKDA    Vital Signs Last 24 Hrs  T(C): 37 (2023 20:08), Max: 37 (2023 20:08)  T(F): 98.6 (2023 20:08), Max: 98.6 (2023 20:08)  HR: 127 (10 Jeromy 2023 00:00) (100 - 141)  BP: 116/61 (2023 23:55) (116/61 - 143/83)  RR: 17 (2023 20:08) (17 - 17)  SpO2: 96% (10 Jeromy 2023 00:00) (93% - 99%)    General: NAD, well-appearing  Heart: RRR  Lungs: CTAB  Abdominal: soft, non-tender, gravid  Ext: non-tender, non-edematous   FHT: baseline 130s, moderate variability, +accels, -decels   [place]: no contractions                           12.2   9.58  )-----------( 173      ( 2023 21:22 )             36.3         137  |  104  |  5.6<L>  ----------------------------<  166<H>  4.1   |  21.0<L>  |  0.52    Ca    8.9      2023 21:22    TPro  6.7  /  Alb  3.3  /  TBili  <0.2<L>  /  DBili  x   /  AST  29  /  ALT  14  /  AlkPhos  145<H>      Protein/Creatinine Ratio Calculation: 0.1 Ratio (23 @ 21:22)

## 2023-01-10 NOTE — OB RN PATIENT PROFILE - NSTRANFUSIONOBJECTION_GEN_ALL_CORE_SIUH
Admission medication history interview status for this patient is complete. See [facility] admission navigator for allergy information, prior to admission medications and immunization status.     Medication history interview source(s):Patient  Medication history resources (including written lists, pill bottles, clinic record):None  Primary pharmacy:[facility], [place]    Changes made to PTA medication list:  Added: robitussin, doxycycline, mv, fish oil  Deleted: none  Changed: humalog    Actions taken by pharmacist (provider contacted, etc):None     Additional medication history information:None    Medication reconciliation/reorder completed by provider prior to medication history? No    Do you take OTC medications (eg tylenol, ibuprofen, fish oil, eye/ear drops, etc)? Y(Y/N)    For patients on insulin therapy: Y (Y/N)  Lantus/levemir/NPH/Mix 70/30 dose: Y  (Y/N) (see Med list for doses)   Sliding scale Novolog N  If Yes, do you have a baseline novolog pre-meal dose: 0.5 unit/carb unit with meals  Patients eat three meals a day:   Y    How many episodes of hypoglycemia do you have per week: ___0, but can feel symptomatic in normal range____  How many missed doses do you have per week: __0____  How many times do you check your blood glucose per day: _______  Do you have a Continuous glucose monitor (CGM)   Y/N (remind pt that not approved for hospital use)   Any Barriers to therapy - Be specific :  cost of medications, comfortable with giving injections (if applicable), comfortable and confident with current diabetes regimen: Y/N ______N________      Prior to Admission medications    Medication Sig Last Dose Taking? Auth Provider   aspirin 81 MG tablet Take 81 mg by mouth daily 12/29/2018 at Unknown time Yes Unknown, Entered By History   canaliflozin (INVOKANA) tablet Take 300 mg by mouth every morning (before breakfast) 12/29/2018 at Unknown time Yes Reported, Patient   doxycycline hyclate (VIBRA-TABS) 100  MG tablet Take 100 mg by mouth 2 times daily 12/30/2018 at Unknown time Yes Unknown, Entered By History   guaiFENesin-codeine (ROBITUSSIN AC) 100-10 MG/5ML solution Take 5 mLs by mouth every 6 hours as needed for cough  Yes Unknown, Entered By History   insulin glargine (LANTUS) 100 UNIT/ML injection Inject 60 Units Subcutaneous every morning 12/30/2018 at Unknown time Yes Unknown, Entered By History   insulin lispro (HUMALOG KWIKPEN) 100 UNIT/ML injection Inject Subcutaneous 3 times daily (with meals) 0.5 unit/carb unit 12/30/2018 at Unknown time Yes Unknown, Entered By History   levothyroxine (SYNTHROID/LEVOTHROID) 137 MCG tablet Take 137 mcg by mouth every morning (before breakfast) 12/30/2018 at Unknown time Yes Unknown, Entered By History   Multiple Vitamins-Minerals (MULTIVITAMIN MEN) TABS Take 1 tablet by mouth daily 12/29/2018 at Unknown time Yes Unknown, Entered By History   Omega-3 Fatty Acids (FISH OIL PO) Take 1 capsule by mouth daily 12/29/2018 at Unknown time Yes Unknown, Entered By History            Patient has no objection to blood transfusions.

## 2023-01-10 NOTE — CHART NOTE - NSCHARTNOTEFT_GEN_A_CORE
TRACING NOTE     FHT: baseline 140s, moderate variability, +accels, -decels   [place]: no contractions     Reactive NST. Continue current management.     D/W Dr. Nascimento TRACING NOTE     FHT: baseline 140s, moderate variability, +accels, -decels   [place]: no contractions     Reactive NST. Continue current management.     D/W Dr. Nascimento      I agree with assessment  FHT 140s with moderate variability and +accels, no decels  [place] no contractions noted    Dr Nascimento

## 2023-01-10 NOTE — PROGRESS NOTE ADULT - PROBLEM SELECTOR PLAN 3
- HA resolved with tylenol, no complaints this AM  - currently normotensive  - pt meets criteria for gHTN if she has another moderately elevated blood pressure. PIH labs within normal limits on admission, P/C 0.1  - continue to monitor - Patient reports multiple episodes of BPs >130/90 in the last several weeks  - On admission, BP elevated to 143/82 and 143/83 more than 4 hours apart  - Previously reported HA resolved with tylenol, no complaints this AM  - PIH labs within normal limits on admission, P/C 0.1  - currently normotensive  - Patient counseled extensively on hypertensive disorders of pregnancy including progression to preeclampsia without and with severe features  - Severe symptoms reviewed and patient knows to advise team if any develop  - continue to monitor while inpatient  - On discharge, she will need 2x weekly fetal testing  - Delivery recommended during 37th week of gestation - Patient reports multiple episodes of BPs >130/90 in the last several weeks  - On admission, BP elevated to 143/82 and 143/83 more than 4 hours apart  - Previously reported HA resolved with Tylenol, no complaints this AM  - PIH labs within normal limits on admission, P/C 0.1  - currently normotensive  - Patient counseled extensively on hypertensive disorders of pregnancy including progression to preeclampsia without and with severe features  - Severe symptoms reviewed and patient knows to advise team if any develop  - continue to monitor while inpatient  - On discharge, she will need 2x weekly fetal testing  - Delivery recommended during 37th week of gestation

## 2023-01-10 NOTE — CONSULT NOTE ADULT - PROBLEM SELECTOR RECOMMENDATION 2
-Endorses palpitations and SOB   -EKG showing sinus tachycardia, no evidence of right ventricular heart strain   -Hgb 12  -TSH 1.44, Free T4 pending   -Echo in AM   -Telemetry   -Cardiology following, recommendations appreciated  -Consider CT Angio if O2 saturation becomes less than 94%

## 2023-01-10 NOTE — PROGRESS NOTE ADULT - PROBLEM SELECTOR PLAN 2
- pt with history of SVT and a-fib s/p ablation 2018  - still endorsing palpitations and SOB since yesterday afternoon, denies fever, chills, congestions  - EKG w sinus tachycardia, w/o evidence of right ventricular heart strain  - telemonitor overnight, HR normal at time of AM rounds  - Hgb 12.2  - TSH 1.44, free T4 pending  - cardiology consult appreciated - if pt becomes tachycardic, try vagal maneuvers followed by 5mg metoprolol IVP for symptomatic tachycardia sustaining >120 BMP; trend CE, trend troponins x3 q6h, serial EKGs, labs to r/o comorbidities, echo to assess structural and functional status, maintain electrolytes, DASH diet  - consider CT angio if O2 saturation <94%

## 2023-01-10 NOTE — CONSULT NOTE ADULT - PROBLEM SELECTOR RECOMMENDATION 9
-No labor complaints   -Reactive NST, no contractions on toco; continue NST BID   -Last MFM sono (): vertex, anterior placenta, EFW 2268g 77%  -GBS unknown  -ACS not indicated at this time as patient is not at high risk of  delivery
Pt is 35 wks pregnant who presented to ED from her prenatal appointment due to elevated /80. BP decreased  w/o any tx, now 129/65. Pt also co palpitations and SOB since yesterday afternoon. Pt with Hx of SVT and a-fib (sp ablation 2018)  ECG: Sinus tachycardia (124 BPM)  HR normal at this time (87 BPM)  Telemonitor  If patient become tachycardic again, try vagal maneuvers first, if unsuccessful may give 5 mg Metoprolol IVP x symptomatic tachycardia sustaining > 120 BPM   cc Bolus x 1   O2 NC PRN x SOB  Trend CE. Trend trops x 3 q6. Serial EKGs  A1C, lipid panel fasting, TFTs, sed rate to ro comorbidities   Echo to assess structural and functional status  Maintain K+~4 and mag~2  DASH diet

## 2023-01-10 NOTE — CONSULT NOTE ADULT - PROBLEM SELECTOR RECOMMENDATION 3
Lipid panel fasting  No taking meds. Controlled with diet.    Continue DASH diet    Further recommendations base on above findings

## 2023-01-10 NOTE — PROGRESS NOTE ADULT - ASSESSMENT
27year old   EDC 23 at 35w5d GA admitted for cardiology workup for sustained maternal sinus tachycardia. HD#1 27year old   EDC 23 at 35w5d GA. Pregnancy complicated by sinus tachycardia, gestational hypertension and other comorbidities.  Admitted for cardiology workup for sustained maternal sinus tachycardia. HD#1

## 2023-01-10 NOTE — OB PROVIDER H&P - NSHPLABSRESULTS_GEN_ALL_CORE
12.2   9.58  )-----------( 173      ( 09 Jan 2023 21:22 )             36.3     01-09    137  |  104  |  5.6<L>  ----------------------------<  166<H>  4.1   |  21.0<L>  |  0.52    Ca    8.9      09 Jan 2023 21:22    TPro  6.7  /  Alb  3.3  /  TBili  <0.2<L>  /  DBili  x   /  AST  29  /  ALT  14  /  AlkPhos  145<H>  01-09    Thyroid Stimulating Hormone, Serum: 1.44 uIU/mL (01.09.23 @ 21:22)

## 2023-01-10 NOTE — CONSULT NOTE ADULT - PROBLEM SELECTOR RECOMMENDATION 5
-SCDs while in bed   -Heparin on HD#3  -Regular diet ordered -SCDs while in bed   -Heparin on HD#3  -DASH diet ordered

## 2023-01-10 NOTE — CONSULT NOTE ADULT - NS ATTEND AMEND GEN_ALL_CORE FT
Patient seen and examined by me.  Patient is feeling fine, no complaints  Patient seen around 5 pm today  Chaperone: Pascale Gilbert RN    T(C): 36.8 (01-10-23 @ 16:51), Max: 37 (01-09-23 @ 20:08)  HR: 101 (01-10-23 @ 16:51) (83 - 141)  BP: 122/61 (01-10-23 @ 16:51) (99/54 - 143/83)  RR: 18 (01-10-23 @ 16:51) (16 - 18)  SpO2: 98% (01-10-23 @ 16:51) (88% - 99%)  Patient alert and awake.  Chest- Bilateral Clear BS  Cardiac- S1 and S2  Abdomen- Soft, 36 weeks pregnant    Assessment:  1. Sinus tachycardia  2. 35 Weeks pregnant    Recommendations:  Patients tele shows sinus tachycardia  Normal TSH  Patients BP has improved    No other recommendations from a cardiology perspective    acetaminophen     Tablet .. 975 milliGRAM(s) Oral once  prenatal multivitamin 1 Tablet(s) Oral daily      I have discussed my recommendation with the PA which are outlined above.  Will sign off

## 2023-01-11 ENCOUNTER — TRANSCRIPTION ENCOUNTER (OUTPATIENT)
Age: 28
End: 2023-01-11

## 2023-01-11 VITALS
TEMPERATURE: 98 F | OXYGEN SATURATION: 97 % | SYSTOLIC BLOOD PRESSURE: 118 MMHG | DIASTOLIC BLOOD PRESSURE: 67 MMHG | HEART RATE: 104 BPM | RESPIRATION RATE: 17 BRPM

## 2023-01-11 PROCEDURE — 76820 UMBILICAL ARTERY ECHO: CPT | Mod: 26,59

## 2023-01-11 PROCEDURE — 99232 SBSQ HOSP IP/OBS MODERATE 35: CPT | Mod: GC

## 2023-01-11 PROCEDURE — 76816 OB US FOLLOW-UP PER FETUS: CPT | Mod: 26

## 2023-01-11 NOTE — CHART NOTE - NSCHARTNOTEFT_GEN_A_CORE
MFM sonogram 1/11 cephalic, BPP 8/8 with PEARL 22, EFW 2749g, evaluated by Dr. Massey  NST 1/11 reactive w baseline 145, moderate variability, +accels, -decels, approved by Dr. Basia Larios for discharge home, return precautions reviewed.   Outpatient follow up scheduled for this Friday, cardio Ob referral submitted.   IOL 2/2 gHTN for next week Friday 1/20.

## 2023-01-11 NOTE — PROGRESS NOTE ADULT - PROBLEM SELECTOR PLAN 1
- dated by [facility] inconsistent with LMP  - NST yesterday reactive, perform q shift  - MFM sonogram : vtx, anterior placenta, EFW 2268g 77%ile  - GBS unknown  - continue daily pnv  - ACS not indicated currently as  delivery is not anticipated
- dated by CRL inconsistent with LMP  - NST last night reactive, perform q shift  - MFM sonogram : vtx, anterior placenta, EFW 2268g 77%ile; repeat today  - GBS unknown, pt to follow up with Dr. Santillan in office   - continue daily pnv  - ACS not indicated currently as  delivery is not anticipated.

## 2023-01-11 NOTE — DISCHARGE NOTE ANTEPARTUM - CARE PLAN
Principal Discharge DX:	Gestational hypertension, antepartum  Assessment and plan of treatment:	Follow up with your provider in 2-3 days for blood pressure check. Monitor blood pressures at home. Come to L&D if you begin experiencing headaches not relieved by pain meds, nausea or vomiting, shortness of breath, or vision changes.     Attend your biweekly fetal monitoring scheduled for this upcoming Friday and Tuesday.     Your IOL has been scheduled for Friday, 1/20 at 8 am.  Secondary Diagnosis:	Sinus tachycardia  Assessment and plan of treatment:	Inpatient cardiology workup has been negative for any pathology. Will refer to Cardio Ob program for outpatient follow up.   1

## 2023-01-11 NOTE — PROGRESS NOTE ADULT - ATTENDING COMMENTS
MFM - IUP at 35w6d admitted for cardiac monitoring secondary to maternal sinus tachycardia.  Has history of SVT s/p ablation without need for cardiac intervention or follow up.  Evaluation to date has shown episodes of sinus tachycardia with no structural cardiac abnormality on echocardiogram and normal EF.  Normotensive with normal HELLP labs. This am, patient feels well and reports no chest pain, dyspnea, or other constitutional complaint.  Bedside BPP 8/8 with AGA fetus and normal PEARL.  NST pending. Patient candidate for outpatient management.  Will refer to CardioOB to coordinate outpatient cardiac follow up.  Continue home BP monitoring and preeclampsia precautions reviewed.  Scheduled for 2x/week fetal testing and IOL at 37 weeks for gestational hypertension.  Routine discharge instructions reviewed and all questions answered.   Cherie Massey MD  Maternal Fetal Medicine
Pregnancy complicated by sinus tachycardia, gestational hypertension and other comorbidities. Continue cardiology evaluation. Delivery at 37 weeks gestation for gestational hypertension or sooner pending maternal and fetal conditions.

## 2023-01-11 NOTE — CHART NOTE - NSCHARTNOTEFT_GEN_A_CORE
Pt evaluated at bedside with Dr. Massey    Reports feeling better than when she was first admitted. Has no Ob complaints. Understands that the cardiologist's workup was negative and she feels comfortable being discharged and following up outpatient. Reviewed the diagnosis of gestational hypertension, precautions, and need for twice weekly fetal monitoring. Bedside BPP today was 8/8, NST pending. Scheduled for BPP/NST this upcoming Friday and Tuesday. Scheduled for IOL 1/20 at 8 am. Pt evaluated at bedside with Dr. Massey    Reports feeling better than when she was first admitted. Has no OB complaints. Understands that the cardiologist's workup was reassuring and she feels comfortable being discharged and following up outpatient. Reviewed the diagnosis of gestational hypertension, precautions, and need for twice weekly fetal monitoring. Bedside BPP today was 8/8, NST pending. Scheduled for BPP/NST this upcoming Friday and Tuesday. Scheduled for IOL 1/20 at 8 am.

## 2023-01-11 NOTE — PROGRESS NOTE ADULT - PROBLEM SELECTOR PLAN 7
- SCDs while in bed  - T&S q3 days  - subcutaneous heparin on HD#3  - NST qshift   - DASH diet  - continue with daily pnv  - ACS for fetal lung maturity not indicated currently as  delivery is not anticipated  - likely discharge today pending attending approval, as cleared by cardiology and w/o Ob complaints  - schedule twice weekly follow up with YAZM
- SCDs while in bed  - T&S q3 days  - subcutaneous heparin on HD#3  - NST qshift   - DASH diet  - continue with daily pnv  - ACS for fetal lung maturity not indicated currently as  delivery is not anticipated

## 2023-01-11 NOTE — DISCHARGE NOTE ANTEPARTUM - PATIENT PORTAL LINK FT
You can access the FollowMyHealth Patient Portal offered by [facility] by registering at the following website: [email or web address]. By joining [employer]’s FollowMyHealth portal, you will also be able to view your health information using other applications (apps) compatible with our system.

## 2023-01-11 NOTE — PROGRESS NOTE ADULT - PROBLEM SELECTOR PLAN 2
- pt with history of SVT and a-fib s/p ablation 2018  - endorsing improvement, now with palpitations and intermittent difficulty catching her breath  - EKG w sinus tachycardia, w/o evidence of right ventricular heart strain, unremarkable echo  - telemonitor overnight with one episode of tachycardia in the 130s around 6 am. HR upper limit of normal at time of AM rounds  - Hgb 10.5  - TSH 1.44, free T4 1.1  - cardiology consult appreciated - sinus tachycardia, negative workup, signed off

## 2023-01-11 NOTE — CHART NOTE - NSCHARTNOTEFT_GEN_A_CORE
Provider informed that pt has runs of tachycardia to the 130s, lasting <20 seconds. Pulse-ox within normal limits during episodes. S/p negative cardiology workup. Referred to cardio Ob for outpatient follow up. Scheduled for twice weekly fetal monitoring until delivery. Clear for discharge pending reactive NST.    discussed with Dr. Massey

## 2023-01-11 NOTE — PROGRESS NOTE ADULT - PROBLEM SELECTOR PLAN 3
- diagnosed on admission after having moderately elevated BPs >4 hours apart  - no complaints of HA, vision changes, CP, RUQ/epigastric pain; endorses intermittent SOB, likely related to advanced gestation, lungs clear to auscultation bilaterally  - PIH labs within normal limits   - currently normotensive, continue to monitor while inpatient  - pt counseled extensively on PIH including progression to preeclampsia with and w/o severe features  - symptoms related to severe features reviewed and pt understands to inform provider if any develop  - plan for twice weekly fetal testing on discharge  - recommend delivery at 37w GA

## 2023-01-11 NOTE — CHART NOTE - NSCHARTNOTESELECT_GEN_ALL_CORE
MFM Attending Rounds/Event Note
Tachycardia/Event Note
Fetal Monitoring/Event Note
PM NST/Event Note

## 2023-01-11 NOTE — DISCHARGE NOTE ANTEPARTUM - CARE PROVIDER_API CALL
Amena Santillan)  Obstetrics and Gynecology  [address] [place] [address]  Phone: [phone]  Fax: [phone]  Follow Up Time:     Cherie Massey)  MaternalFetal Medicine; Obstetrics and Gynecology  [address] [place] [address]  Phone: [phone]  Fax: [phone]  Scheduled Appointment: 01/13/2023 10:30 AM    Quin Ortega)  Obstetrics and Gynecology  [address] [place] [address]  Phone: [phone]  Fax: [phone]  Scheduled Appointment: 01/20/2023 08:00 AM

## 2023-01-11 NOTE — DISCHARGE NOTE ANTEPARTUM - MEDICATION SUMMARY - MEDICATIONS TO TAKE
I will START or STAY ON the medications listed below when I get home from the hospital:    Prenatal Multivitamins with Folic Acid 1 mg oral tablet  -- 1 tab(s) by mouth once a day  -- Indication: For 35 weeks gestation of pregnancy

## 2023-01-11 NOTE — DISCHARGE NOTE ANTEPARTUM - HOSPITAL COURSE
Pt admitted for cardiac monitoring secondary to maternal sinus tachycardia with history of SVT s/p ablation. Inpatient workup was negative for any cardiac pathology. Patient found to have multiple moderately elevated BPs, diagnosed with gestational hypertension. Labs within normal limits. Scheduled for fetal monitoring this upcoming Friday and Tuesday and induction of labor at [facility] on 1/20 at 8 am.

## 2023-01-11 NOTE — DISCHARGE NOTE ANTEPARTUM - PROVIDER TOKENS
PROVIDER:[TOKEN:[[id number]]],PROVIDER:[TOKEN:[[id number]],SCHEDULEDAPPT:[01/13/2023],SCHEDULEDAPPTTIME:[10:30 AM]],PROVIDER:[TOKEN:[[id number]],SCHEDULEDAPPT:[01/20/2023],SCHEDULEDAPPTTIME:[08:00 AM]]

## 2023-01-11 NOTE — PROGRESS NOTE ADULT - ASSESSMENT
27year old   EDC 23 at 35w6d GA. Pregnancy complicated by sinus tachycardia, gestational hypertension and other comorbidities.  Admitted for cardiology workup for sustained maternal sinus tachycardia, cleared by cardiologist after negative workup. HD#2

## 2023-01-11 NOTE — DISCHARGE NOTE NURSING/CASE MANAGEMENT/SOCIAL WORK - PATIENT PORTAL LINK FT
You can access the FollowMyHealth Patient Portal offered by [facility] by registering at the following website: [email or web address]. By joining [employer]’s FollowMyHealth portal, you will also be able to view your health information using other applications (apps) compatible with our system.

## 2023-01-11 NOTE — DISCHARGE NOTE ANTEPARTUM - PLAN OF CARE
Follow up with your provider in 2-3 days for blood pressure check. Monitor blood pressures at home. Come to L&D if you begin experiencing headaches not relieved by pain meds, nausea or vomiting, shortness of breath, or vision changes.     Attend your biweekly fetal monitoring scheduled for this upcoming Friday and Tuesday.     Your IOL has been scheduled for Friday, 1/20 at 8 am. Inpatient cardiology workup has been negative for any pathology. Will refer to Cardio Ob program for outpatient follow up.

## 2023-01-11 NOTE — PROGRESS NOTE ADULT - PROBLEM SELECTOR PLAN 5
- BMI 32.4  - Increases risk for stillbirth, hypertension,  deliveries, and operative complications.

## 2023-01-13 ENCOUNTER — APPOINTMENT (OUTPATIENT)
Dept: OBGYN | Facility: CLINIC | Age: 28
End: 2023-01-13
Payer: COMMERCIAL

## 2023-01-13 ENCOUNTER — APPOINTMENT (OUTPATIENT)
Dept: ANTEPARTUM | Facility: CLINIC | Age: 28
End: 2023-01-13
Payer: COMMERCIAL

## 2023-01-13 ENCOUNTER — NON-APPOINTMENT (OUTPATIENT)
Age: 28
End: 2023-01-13

## 2023-01-13 ENCOUNTER — ASOB RESULT (OUTPATIENT)
Age: 28
End: 2023-01-13

## 2023-01-13 VITALS
HEART RATE: 101 BPM | SYSTOLIC BLOOD PRESSURE: 128 MMHG | BODY MASS INDEX: 32.96 KG/M2 | WEIGHT: 186 LBS | DIASTOLIC BLOOD PRESSURE: 86 MMHG | HEIGHT: 63 IN

## 2023-01-13 LAB
BILIRUB UR QL STRIP: NORMAL
GLUCOSE UR-MCNC: NEGATIVE
HCG UR QL: 0.2 EU/DL
HGB UR QL STRIP.AUTO: NEGATIVE
KETONES UR-MCNC: NEGATIVE
LEUKOCYTE ESTERASE UR QL STRIP: NORMAL
NITRITE UR QL STRIP: NEGATIVE
PH UR STRIP: 6.5
PROT UR STRIP-MCNC: 30
SP GR UR STRIP: 1.02

## 2023-01-13 PROCEDURE — 93976 VASCULAR STUDY: CPT

## 2023-01-13 PROCEDURE — 76818 FETAL BIOPHYS PROFILE W/NST: CPT

## 2023-01-13 PROCEDURE — 76820 UMBILICAL ARTERY ECHO: CPT

## 2023-01-13 PROCEDURE — 0502F SUBSEQUENT PRENATAL CARE: CPT

## 2023-01-13 PROCEDURE — ZZZZZ: CPT

## 2023-01-13 PROCEDURE — 81002 URINALYSIS NONAUTO W/O SCOPE: CPT

## 2023-01-15 LAB
GP B STREP DNA SPEC QL NAA+PROBE: NOT DETECTED
SOURCE GBS: NORMAL

## 2023-01-17 ENCOUNTER — APPOINTMENT (OUTPATIENT)
Dept: ANTEPARTUM | Facility: CLINIC | Age: 28
End: 2023-01-17
Payer: COMMERCIAL

## 2023-01-17 ENCOUNTER — APPOINTMENT (OUTPATIENT)
Dept: OBGYN | Facility: CLINIC | Age: 28
End: 2023-01-17
Payer: COMMERCIAL

## 2023-01-17 ENCOUNTER — ASOB RESULT (OUTPATIENT)
Age: 28
End: 2023-01-17

## 2023-01-17 VITALS
BODY MASS INDEX: 32.78 KG/M2 | HEIGHT: 63 IN | WEIGHT: 185 LBS | SYSTOLIC BLOOD PRESSURE: 123 MMHG | DIASTOLIC BLOOD PRESSURE: 82 MMHG | HEART RATE: 101 BPM

## 2023-01-17 PROCEDURE — 93976 VASCULAR STUDY: CPT

## 2023-01-17 PROCEDURE — 76820 UMBILICAL ARTERY ECHO: CPT

## 2023-01-17 PROCEDURE — ZZZZZ: CPT

## 2023-01-17 PROCEDURE — 81002 URINALYSIS NONAUTO W/O SCOPE: CPT

## 2023-01-17 PROCEDURE — 0502F SUBSEQUENT PRENATAL CARE: CPT

## 2023-01-17 PROCEDURE — 76818 FETAL BIOPHYS PROFILE W/NST: CPT

## 2023-01-17 RX ORDER — OXYTOCIN 10 UNIT/ML
333.33 VIAL (ML) INJECTION
Qty: 20 | Refills: 0 | Status: DISCONTINUED | OUTPATIENT
Start: 2023-01-20 | End: 2023-01-22

## 2023-01-17 RX ORDER — CITRIC ACID/SODIUM CITRATE 300-500 MG
30 SOLUTION, ORAL ORAL ONCE
Refills: 0 | Status: COMPLETED | OUTPATIENT
Start: 2023-01-20 | End: 2023-01-20

## 2023-01-17 RX ORDER — CHLORHEXIDINE GLUCONATE 213 G/1000ML
1 SOLUTION TOPICAL ONCE
Refills: 0 | Status: DISCONTINUED | OUTPATIENT
Start: 2023-01-20 | End: 2023-01-21

## 2023-01-17 RX ORDER — SODIUM CHLORIDE 9 MG/ML
1000 INJECTION, SOLUTION INTRAVENOUS
Refills: 0 | Status: DISCONTINUED | OUTPATIENT
Start: 2023-01-20 | End: 2023-01-21

## 2023-01-18 LAB
BILIRUB UR QL STRIP: NEGATIVE
CLARITY UR: CLEAR
COLLECTION METHOD: NORMAL
GLUCOSE UR-MCNC: NEGATIVE
HCG UR QL: 0.2 EU/DL
HGB UR QL STRIP.AUTO: NEGATIVE
KETONES UR-MCNC: NEGATIVE
LEUKOCYTE ESTERASE UR QL STRIP: NORMAL
NITRITE UR QL STRIP: NEGATIVE
PH UR STRIP: 7
PROT UR STRIP-MCNC: NORMAL
SP GR UR STRIP: 1.02

## 2023-01-19 ENCOUNTER — TRANSCRIPTION ENCOUNTER (OUTPATIENT)
Age: 28
End: 2023-01-19

## 2023-01-20 ENCOUNTER — INPATIENT (INPATIENT)
Facility: HOSPITAL | Age: 28
LOS: 1 days | Discharge: HOME | End: 2023-01-22
Attending: OBSTETRICS & GYNECOLOGY | Admitting: OBSTETRICS & GYNECOLOGY
Payer: COMMERCIAL

## 2023-01-20 ENCOUNTER — RESULT REVIEW (OUTPATIENT)
Age: 28
End: 2023-01-20

## 2023-01-20 ENCOUNTER — APPOINTMENT (OUTPATIENT)
Dept: OBGYN | Facility: HOSPITAL | Age: 28
End: 2023-01-20

## 2023-01-20 ENCOUNTER — NON-APPOINTMENT (OUTPATIENT)
Age: 28
End: 2023-01-20

## 2023-01-20 ENCOUNTER — TRANSCRIPTION ENCOUNTER (OUTPATIENT)
Age: 28
End: 2023-01-20

## 2023-01-20 VITALS
HEIGHT: 63 IN | RESPIRATION RATE: 17 BRPM | DIASTOLIC BLOOD PRESSURE: 89 MMHG | WEIGHT: 184.97 LBS | HEART RATE: 92 BPM | TEMPERATURE: 97 F | SYSTOLIC BLOOD PRESSURE: 151 MMHG

## 2023-01-20 DIAGNOSIS — Z98.890 OTHER SPECIFIED POSTPROCEDURAL STATES: Chronic | ICD-10-CM

## 2023-01-20 LAB
ALBUMIN SERPL ELPH-MCNC: 3.6 G/DL — SIGNIFICANT CHANGE UP (ref 3.3–5.2)
ALP SERPL-CCNC: 178 U/L — HIGH (ref 40–120)
ALT FLD-CCNC: 14 U/L — SIGNIFICANT CHANGE UP
ANION GAP SERPL CALC-SCNC: 14 MMOL/L — SIGNIFICANT CHANGE UP (ref 5–17)
APPEARANCE UR: CLEAR — SIGNIFICANT CHANGE UP
APTT BLD: 26.5 SEC — LOW (ref 27.5–35.5)
AST SERPL-CCNC: 23 U/L — SIGNIFICANT CHANGE UP
BACTERIA # UR AUTO: ABNORMAL
BASOPHILS # BLD AUTO: 0.03 K/UL — SIGNIFICANT CHANGE UP (ref 0–0.2)
BASOPHILS NFR BLD AUTO: 0.3 % — SIGNIFICANT CHANGE UP (ref 0–2)
BILIRUB SERPL-MCNC: 0.3 MG/DL — LOW (ref 0.4–2)
BILIRUB UR-MCNC: NEGATIVE — SIGNIFICANT CHANGE UP
BLD GP AB SCN SERPL QL: SIGNIFICANT CHANGE UP
BUN SERPL-MCNC: 7.1 MG/DL — LOW (ref 8–20)
CALCIUM SERPL-MCNC: 9.5 MG/DL — SIGNIFICANT CHANGE UP (ref 8.4–10.5)
CHLORIDE SERPL-SCNC: 100 MMOL/L — SIGNIFICANT CHANGE UP (ref 96–108)
CO2 SERPL-SCNC: 21 MMOL/L — LOW (ref 22–29)
COLOR SPEC: YELLOW — SIGNIFICANT CHANGE UP
COVID-19 SPIKE DOMAIN AB INTERP: POSITIVE
COVID-19 SPIKE DOMAIN ANTIBODY RESULT: >250 U/ML — HIGH
CREAT ?TM UR-MCNC: 78 MG/DL — SIGNIFICANT CHANGE UP
CREAT SERPL-MCNC: 0.47 MG/DL — LOW (ref 0.5–1.3)
DIFF PNL FLD: NEGATIVE — SIGNIFICANT CHANGE UP
EGFR: 134 ML/MIN/1.73M2 — SIGNIFICANT CHANGE UP
EOSINOPHIL # BLD AUTO: 0.08 K/UL — SIGNIFICANT CHANGE UP (ref 0–0.5)
EOSINOPHIL NFR BLD AUTO: 0.8 % — SIGNIFICANT CHANGE UP (ref 0–6)
EPI CELLS # UR: ABNORMAL
FIBRINOGEN PPP-MCNC: 948 MG/DL — CRITICAL HIGH (ref 330–520)
GLUCOSE SERPL-MCNC: 90 MG/DL — SIGNIFICANT CHANGE UP (ref 70–99)
GLUCOSE UR QL: NEGATIVE MG/DL — SIGNIFICANT CHANGE UP
HCT VFR BLD CALC: 36.9 % — SIGNIFICANT CHANGE UP (ref 34.5–45)
HCT VFR BLD CALC: 37.6 % — SIGNIFICANT CHANGE UP (ref 34.5–45)
HGB BLD-MCNC: 12.2 G/DL — SIGNIFICANT CHANGE UP (ref 11.5–15.5)
HGB BLD-MCNC: 12.8 G/DL — SIGNIFICANT CHANGE UP (ref 11.5–15.5)
IMM GRANULOCYTES NFR BLD AUTO: 0.4 % — SIGNIFICANT CHANGE UP (ref 0–0.9)
INR BLD: 0.95 RATIO — SIGNIFICANT CHANGE UP (ref 0.88–1.16)
KETONES UR-MCNC: ABNORMAL
LDH SERPL L TO P-CCNC: 205 U/L — HIGH (ref 98–192)
LEUKOCYTE ESTERASE UR-ACNC: ABNORMAL
LYMPHOCYTES # BLD AUTO: 2.1 K/UL — SIGNIFICANT CHANGE UP (ref 1–3.3)
LYMPHOCYTES # BLD AUTO: 20.3 % — SIGNIFICANT CHANGE UP (ref 13–44)
MCHC RBC-ENTMCNC: 30.3 PG — SIGNIFICANT CHANGE UP (ref 27–34)
MCHC RBC-ENTMCNC: 30.4 PG — SIGNIFICANT CHANGE UP (ref 27–34)
MCHC RBC-ENTMCNC: 33.1 GM/DL — SIGNIFICANT CHANGE UP (ref 32–36)
MCHC RBC-ENTMCNC: 34 GM/DL — SIGNIFICANT CHANGE UP (ref 32–36)
MCV RBC AUTO: 89.3 FL — SIGNIFICANT CHANGE UP (ref 80–100)
MCV RBC AUTO: 91.6 FL — SIGNIFICANT CHANGE UP (ref 80–100)
MONOCYTES # BLD AUTO: 0.69 K/UL — SIGNIFICANT CHANGE UP (ref 0–0.9)
MONOCYTES NFR BLD AUTO: 6.7 % — SIGNIFICANT CHANGE UP (ref 2–14)
NEUTROPHILS # BLD AUTO: 7.42 K/UL — HIGH (ref 1.8–7.4)
NEUTROPHILS NFR BLD AUTO: 71.5 % — SIGNIFICANT CHANGE UP (ref 43–77)
NITRITE UR-MCNC: NEGATIVE — SIGNIFICANT CHANGE UP
PH UR: 8 — SIGNIFICANT CHANGE UP (ref 5–8)
PLATELET # BLD AUTO: 192 K/UL — SIGNIFICANT CHANGE UP (ref 150–400)
PLATELET # BLD AUTO: 205 K/UL — SIGNIFICANT CHANGE UP (ref 150–400)
POTASSIUM SERPL-MCNC: 3.9 MMOL/L — SIGNIFICANT CHANGE UP (ref 3.5–5.3)
POTASSIUM SERPL-SCNC: 3.9 MMOL/L — SIGNIFICANT CHANGE UP (ref 3.5–5.3)
PROT ?TM UR-MCNC: 15 MG/DL — HIGH (ref 0–12)
PROT SERPL-MCNC: 7.1 G/DL — SIGNIFICANT CHANGE UP (ref 6.6–8.7)
PROT UR-MCNC: NEGATIVE — SIGNIFICANT CHANGE UP
PROT/CREAT UR-RTO: 0.2 RATIO — SIGNIFICANT CHANGE UP
PROTHROM AB SERPL-ACNC: 11 SEC — SIGNIFICANT CHANGE UP (ref 10.5–13.4)
RBC # BLD: 4.03 M/UL — SIGNIFICANT CHANGE UP (ref 3.8–5.2)
RBC # BLD: 4.21 M/UL — SIGNIFICANT CHANGE UP (ref 3.8–5.2)
RBC # FLD: 13.3 % — SIGNIFICANT CHANGE UP (ref 10.3–14.5)
RBC # FLD: 13.5 % — SIGNIFICANT CHANGE UP (ref 10.3–14.5)
RBC CASTS # UR COMP ASSIST: SIGNIFICANT CHANGE UP /HPF (ref 0–4)
SARS-COV-2 IGG+IGM SERPL QL IA: >250 U/ML — HIGH
SARS-COV-2 IGG+IGM SERPL QL IA: POSITIVE
SARS-COV-2 RNA SPEC QL NAA+PROBE: SIGNIFICANT CHANGE UP
SODIUM SERPL-SCNC: 135 MMOL/L — SIGNIFICANT CHANGE UP (ref 135–145)
SP GR SPEC: 1.01 — SIGNIFICANT CHANGE UP (ref 1.01–1.02)
T PALLIDUM AB TITR SER: NEGATIVE — SIGNIFICANT CHANGE UP
URATE SERPL-MCNC: 3.6 MG/DL — SIGNIFICANT CHANGE UP (ref 2.4–5.7)
UROBILINOGEN FLD QL: NEGATIVE MG/DL — SIGNIFICANT CHANGE UP
WBC # BLD: 10.36 K/UL — SIGNIFICANT CHANGE UP (ref 3.8–10.5)
WBC # BLD: 18.56 K/UL — HIGH (ref 3.8–10.5)
WBC # FLD AUTO: 10.36 K/UL — SIGNIFICANT CHANGE UP (ref 3.8–10.5)
WBC # FLD AUTO: 18.56 K/UL — HIGH (ref 3.8–10.5)
WBC UR QL: SIGNIFICANT CHANGE UP /HPF (ref 0–5)

## 2023-01-20 PROCEDURE — 93010 ELECTROCARDIOGRAM REPORT: CPT

## 2023-01-20 PROCEDURE — 59510 CESAREAN DELIVERY: CPT

## 2023-01-20 PROCEDURE — 88305 TISSUE EXAM BY PATHOLOGIST: CPT | Mod: 26

## 2023-01-20 RX ORDER — CITRIC ACID/SODIUM CITRATE 300-500 MG
30 SOLUTION, ORAL ORAL ONCE
Refills: 0 | Status: COMPLETED | OUTPATIENT
Start: 2023-01-20 | End: 2023-01-20

## 2023-01-20 RX ORDER — IBUPROFEN 200 MG
600 TABLET ORAL EVERY 6 HOURS
Refills: 0 | Status: COMPLETED | OUTPATIENT
Start: 2023-01-20 | End: 2023-12-19

## 2023-01-20 RX ORDER — FOLIC ACID 0.8 MG
1 TABLET ORAL DAILY
Refills: 0 | Status: DISCONTINUED | OUTPATIENT
Start: 2023-01-20 | End: 2023-01-22

## 2023-01-20 RX ORDER — ACETAMINOPHEN 500 MG
1000 TABLET ORAL ONCE
Refills: 0 | Status: COMPLETED | OUTPATIENT
Start: 2023-01-20 | End: 2023-01-20

## 2023-01-20 RX ORDER — SODIUM CHLORIDE 9 MG/ML
500 INJECTION, SOLUTION INTRAVENOUS ONCE
Refills: 0 | Status: COMPLETED | OUTPATIENT
Start: 2023-01-20 | End: 2023-01-20

## 2023-01-20 RX ORDER — AMPICILLIN TRIHYDRATE 250 MG
2 CAPSULE ORAL EVERY 6 HOURS
Refills: 0 | Status: DISCONTINUED | OUTPATIENT
Start: 2023-01-21 | End: 2023-01-21

## 2023-01-20 RX ORDER — DIPHENHYDRAMINE HCL 50 MG
25 CAPSULE ORAL EVERY 6 HOURS
Refills: 0 | Status: DISCONTINUED | OUTPATIENT
Start: 2023-01-20 | End: 2023-01-22

## 2023-01-20 RX ORDER — ASCORBIC ACID 60 MG
500 TABLET,CHEWABLE ORAL DAILY
Refills: 0 | Status: DISCONTINUED | OUTPATIENT
Start: 2023-01-20 | End: 2023-01-22

## 2023-01-20 RX ORDER — FAMOTIDINE 10 MG/ML
20 INJECTION INTRAVENOUS ONCE
Refills: 0 | Status: COMPLETED | OUTPATIENT
Start: 2023-01-20 | End: 2023-01-20

## 2023-01-20 RX ORDER — OXYTOCIN 10 UNIT/ML
333.33 VIAL (ML) INJECTION
Qty: 20 | Refills: 0 | Status: DISCONTINUED | OUTPATIENT
Start: 2023-01-20 | End: 2023-01-22

## 2023-01-20 RX ORDER — FERROUS SULFATE 325(65) MG
325 TABLET ORAL DAILY
Refills: 0 | Status: DISCONTINUED | OUTPATIENT
Start: 2023-01-20 | End: 2023-01-22

## 2023-01-20 RX ORDER — MAGNESIUM HYDROXIDE 400 MG/1
30 TABLET, CHEWABLE ORAL
Refills: 0 | Status: DISCONTINUED | OUTPATIENT
Start: 2023-01-20 | End: 2023-01-22

## 2023-01-20 RX ORDER — SIMETHICONE 80 MG/1
80 TABLET, CHEWABLE ORAL EVERY 4 HOURS
Refills: 0 | Status: DISCONTINUED | OUTPATIENT
Start: 2023-01-20 | End: 2023-01-22

## 2023-01-20 RX ORDER — SODIUM CHLORIDE 9 MG/ML
1000 INJECTION, SOLUTION INTRAVENOUS
Refills: 0 | Status: DISCONTINUED | OUTPATIENT
Start: 2023-01-20 | End: 2023-01-22

## 2023-01-20 RX ORDER — TETANUS TOXOID, REDUCED DIPHTHERIA TOXOID AND ACELLULAR PERTUSSIS VACCINE, ADSORBED 5; 2.5; 8; 8; 2.5 [IU]/.5ML; [IU]/.5ML; UG/.5ML; UG/.5ML; UG/.5ML
0.5 SUSPENSION INTRAMUSCULAR ONCE
Refills: 0 | Status: COMPLETED | OUTPATIENT
Start: 2023-01-20

## 2023-01-20 RX ORDER — SODIUM CHLORIDE 9 MG/ML
1000 INJECTION, SOLUTION INTRAVENOUS ONCE
Refills: 0 | Status: COMPLETED | OUTPATIENT
Start: 2023-01-20 | End: 2023-01-20

## 2023-01-20 RX ORDER — ACETAMINOPHEN 500 MG
975 TABLET ORAL
Refills: 0 | Status: DISCONTINUED | OUTPATIENT
Start: 2023-01-20 | End: 2023-01-22

## 2023-01-20 RX ORDER — OXYTOCIN 10 UNIT/ML
VIAL (ML) INJECTION
Qty: 30 | Refills: 0 | Status: DISCONTINUED | OUTPATIENT
Start: 2023-01-20 | End: 2023-01-21

## 2023-01-20 RX ORDER — AMPICILLIN TRIHYDRATE 250 MG
2 CAPSULE ORAL ONCE
Refills: 0 | Status: COMPLETED | OUTPATIENT
Start: 2023-01-20 | End: 2023-01-20

## 2023-01-20 RX ORDER — GENTAMICIN SULFATE 40 MG/ML
260 VIAL (ML) INJECTION ONCE
Refills: 0 | Status: DISCONTINUED | OUTPATIENT
Start: 2023-01-20 | End: 2023-01-20

## 2023-01-20 RX ORDER — OXYCODONE HYDROCHLORIDE 5 MG/1
5 TABLET ORAL ONCE
Refills: 0 | Status: DISCONTINUED | OUTPATIENT
Start: 2023-01-20 | End: 2023-01-22

## 2023-01-20 RX ORDER — OXYCODONE HYDROCHLORIDE 5 MG/1
5 TABLET ORAL
Refills: 0 | Status: DISCONTINUED | OUTPATIENT
Start: 2023-01-20 | End: 2023-01-22

## 2023-01-20 RX ORDER — AMPICILLIN TRIHYDRATE 250 MG
CAPSULE ORAL
Refills: 0 | Status: DISCONTINUED | OUTPATIENT
Start: 2023-01-20 | End: 2023-01-21

## 2023-01-20 RX ORDER — KETOROLAC TROMETHAMINE 30 MG/ML
30 SYRINGE (ML) INJECTION EVERY 6 HOURS
Refills: 0 | Status: DISCONTINUED | OUTPATIENT
Start: 2023-01-20 | End: 2023-01-21

## 2023-01-20 RX ORDER — LANOLIN
1 OINTMENT (GRAM) TOPICAL EVERY 6 HOURS
Refills: 0 | Status: DISCONTINUED | OUTPATIENT
Start: 2023-01-20 | End: 2023-01-22

## 2023-01-20 RX ORDER — GENTAMICIN SULFATE 40 MG/ML
320 VIAL (ML) INJECTION ONCE
Refills: 0 | Status: COMPLETED | OUTPATIENT
Start: 2023-01-20 | End: 2023-01-20

## 2023-01-20 RX ADMIN — Medication 200 MILLIGRAM(S): at 20:38

## 2023-01-20 RX ADMIN — Medication 100 MILLIGRAM(S): at 22:00

## 2023-01-20 RX ADMIN — SODIUM CHLORIDE 500 MILLILITER(S): 9 INJECTION, SOLUTION INTRAVENOUS at 19:08

## 2023-01-20 RX ADMIN — Medication 200 GRAM(S): at 19:37

## 2023-01-20 RX ADMIN — Medication 30 MILLILITER(S): at 15:06

## 2023-01-20 RX ADMIN — SODIUM CHLORIDE 125 MILLILITER(S): 9 INJECTION, SOLUTION INTRAVENOUS at 09:44

## 2023-01-20 RX ADMIN — Medication 400 MILLIGRAM(S): at 19:09

## 2023-01-20 RX ADMIN — FAMOTIDINE 20 MILLIGRAM(S): 10 INJECTION INTRAVENOUS at 20:52

## 2023-01-20 RX ADMIN — Medication 1000 MILLIGRAM(S): at 19:43

## 2023-01-20 RX ADMIN — Medication 2 MILLIUNIT(S)/MIN: at 09:55

## 2023-01-20 RX ADMIN — SODIUM CHLORIDE 1000 MILLILITER(S): 9 INJECTION, SOLUTION INTRAVENOUS at 15:01

## 2023-01-20 NOTE — OB PROVIDER DELIVERY SUMMARY - NSLOWPPHRISK_OBGYN_A_OB
No previous uterine incision/Yates Pregnancy/No known bleeding disorder/No history of postpartum hemorrhage/No other PPH risks indicated

## 2023-01-20 NOTE — OB RN INTRAOPERATIVE NOTE - NS_ADD OB DELIVERY PROCEDURE_OBGYN_ALL_OB
Patient started Augmentin 875mg BID yesterday, took one dose yesterday, will increase to BID today. Has f/u appt on 1/3 with Dr. Maldonado, spoke with patient, she agrees to lab INR on 1/3/19, order placed.  
Click here

## 2023-01-20 NOTE — OB RN INTRAOPERATIVE NOTE - NSSELHIDDEN_OBGYN_ALL_OB_FT
[NS_DeliveryAttending1_OBGYN_ALL_OB_FT:[id number]==],[NS_DeliveryAttending2_OBGYN_ALL_OB_FT:[id number]==],[NS_DeliveryAssist1_OBGYN_ALL_OB_FT:[id number]=],[NS_DeliveryRN_OBGYN_ALL_OB_FT:[id number]==]

## 2023-01-20 NOTE — OB PROVIDER LABOR PROGRESS NOTE - ASSESSMENT
Pt admitted for induction of labor 2/2 [facility].   -Febrile and fetal tachycardia, now chorioamnionitis suspected   -Amp, gent ordered   -S/p LR bolus and ofirmev   -Continue pitocin   
Plan  -EKG
Pt admitted for induction of labor secondary to gHTN now with chorioamnionitis.   -VSS   -Category 2 tracing   -Petros regularly     Patient counseled on the R/B/A to pCS in the setting of category 2 tracing remote from delivery versus continued induction of labor. Patient counseled on risks of injury to surrounding organs, pain, infection and bleeding. Patient will accept a blood transfusion in the event of an emergency. Patient desires to move forward with pCS at this time. All questions answered to patient's satisfaction.     Dr. Santillan at bedside.

## 2023-01-20 NOTE — OB RN DELIVERY SUMMARY - NSSELHIDDEN_OBGYN_ALL_OB_FT
[NS_DeliveryAttending1_OBGYN_ALL_OB_FT:[id number]==],[NS_DeliveryAttending2_OBGYN_ALL_OB_FT:[id number]==],[NS_DeliveryAssist1_OBGYN_ALL_OB_FT:[id number]] [NS_DeliveryAttending1_OBGYN_ALL_OB_FT:[id number]==],[NS_DeliveryAttending2_OBGYN_ALL_OB_FT:[id number]==],[NS_DeliveryAssist1_OBGYN_ALL_OB_FT:[id number]=],[NS_DeliveryRN_OBGYN_ALL_OB_FT:[id number]==]

## 2023-01-20 NOTE — OB PROVIDER LABOR PROGRESS NOTE - NS_OBIHIFHRDETAILS_OBGYN_ALL_OB_FT
baseline 170s, moderate variability, +accels, -decels
baseline 180s, moderate variability, +accels, -decels

## 2023-01-20 NOTE — OB PROVIDER H&P - PRO PRENATAL LABS ORI SOURCE BLOOD TYPE
SCM Complex Repair And Single Advancement Flap Text: The defect edges were debeveled with a #15 scalpel blade.  The primary defect was closed partially with a complex linear closure.  Given the location of the remaining defect, shape of the defect and the proximity to free margins a single advancement flap was deemed most appropriate for complete closure of the defect.  Using a sterile surgical marker, an appropriate advancement flap was drawn incorporating the defect and placing the expected incisions within the relaxed skin tension lines where possible.    The area thus outlined was incised deep to adipose tissue with a #15 scalpel blade.  The skin margins were undermined to an appropriate distance in all directions utilizing iris scissors.

## 2023-01-20 NOTE — OB RN PATIENT PROFILE - FALL HARM RISK - UNIVERSAL INTERVENTIONS
Bed in lowest position, wheels locked, appropriate side rails in place/Call bell, personal items and telephone in reach/Instruct patient to call for assistance before getting out of bed or chair/Non-slip footwear when patient is out of bed/[place] to call system/Physically safe environment - no spills, clutter or unnecessary equipment/Purposeful Proactive Rounding/Room/bathroom lighting operational, light cord in reach

## 2023-01-20 NOTE — CHART NOTE - NSCHARTNOTEFT_GEN_A_CORE
[facility]                                                         CARDIOLOGY AT [address], [place] [address]                                                         Telephone: [phone]. Fax:[phone]                                                                            CHART NOTE        ECG: ST rate 122, NSST-T changes.    < from: TTE Echo Complete w/o Contrast w/ Doppler (01.10.23 @ 17:59) >    PHYSICIAN INTERPRETATION:  Left Ventricle: The left ventricular internal cavity size is normal. Left   ventricular wall thickness is normal.  Global LV systolic function was normal. Left ventricular ejection   fraction, by visual estimation, is 55 to 60%. The left ventricular   diastolic function could not be assessed in this study.  Right Ventricle: The right ventricular size is normal. RV systolic   function is normal.  Left Atrium: The left atrium is normal in size.  Right Atrium: The right atrium is normal in size.  Pericardium: There is no evidence of pericardial effusion.  Mitral Valve: The mitral valve is normal in structure. Mitral leaflet   mobility is normal. Trace mitral valve regurgitation is seen.  Tricuspid Valve: The tricuspid valve is normal in structure. Trivial   tricuspid regurgitation is visualized. Adequate TR velocity was not   obtained to accurately assess RVSP.  Aortic Valve: The aortic valve is trileaflet. No evidence of aortic   stenosis. No evidence of aortic valve regurgitation is seen.  Pulmonic Valve: The pulmonic valve is normal. No indication of pulmonic   valve regurgitation.  Aorta: The aortic root is normal in size and structure.  Pulmonary Artery: The pulmonary artery is of normal size and origin.  In comparison to the previous echocardiogram(s): Thereare no prior   studies on this patient for comparison purposes.      Summary:   1. Left ventricular ejection fraction, by visual estimation, is 55 to   60%.   2. Normal global left ventricular systolic function.   3. The left ventricular diastolic function could not be assessed in this   study.   4. Trace mitral valve regurgitation.   5. Adequate TR velocity was not obtained to accurately assess RVSP.    Elier Ortiz MD Electronically signed on 1/10/2023 at 8:22:03 PM      < end of copied text >        history of SVT      Cardiology called to evaluate EKG  ST, NSST T abnl. No ischemia, no infarct.   ECHO from 1/10, EF 60-65%.   No cardiac intervention needed at this time, pt in Labor and delivery Pictocin infusing.     discussed with Dr. Ramirez

## 2023-01-20 NOTE — OB PROVIDER H&P - NSHPPHYSICALEXAM_GEN_ALL_CORE
Vital Signs Last 24 Hrs  T(C): 36.2 (20 Jan 2023 08:57), Max: 36.2 (20 Jan 2023 08:02)  T(F): 97.16 (20 Jan 2023 08:57), Max: 97.2 (20 Jan 2023 08:02)  HR: 101 (20 Jan 2023 08:50) (92 - 101)  BP: 143/87 (20 Jan 2023 08:50) (143/87 - 151/89)  RR: 17 (20 Jan 2023 08:57) (17 - 17)    Gen: well-appearing, NAD  Neuro: A&Ox3  Cards: RRR  Pulm: CTAB  Abd: nontender, gravid  VE: 2/50/-3  Bedside sono: cephalic  FHT: baseline 130, mod variability, +accels, -decels  TOco: contractions q2m, not feeling them

## 2023-01-20 NOTE — OB NEONATOLOGY/PEDIATRICIAN DELIVERY SUMMARY - NSPEDSNEONOTESA_OBGYN_ALL_OB_FT
Requested by DR Ewing to attend a PC/S of a 28y/o  at 37.1 weeks GA secondary to maternal chorio.......  She had + PNC, is blood type A pos, HIV neg, HBsAg neg, RPR NR, Rubella Imm, GBS neg, COVID19 neg...  L&D:  ROM .....  Baby born .... with good cry, transferred to warmer, orally suctioned, dried, and examined. Infant showed to father and then transferred to mother for STS.  A/P:  37.1 week GA female  Transition to Regular Nursery under Peds Hospitalist care. Requested by DR Ewing to attend a PC/S of a 26y/o  at 37.1 weeks GA secondary to maternal chorio.......  She had + PNC, is blood type A pos, HIV neg, HBsAg neg, RPR NR, Rubella Imm, GBS neg, COVID19 neg...  L&D:  Mother admitted to L&D for IOL for GHTN.  AROM aprox 8 hrs PTD with clear fluids.  Mother developed a fever T max 38C, + fetal tachycardia (170's), she received Amp & Clinda PTD., Baby born vertex with vacuum assist., weak cry, transferred to warmer, orally suctioned, dried, and examined. APGAR Score: 9/9. Infant showed to father and then transferred to mother for STS.  BW: 3400g  EOS: 0.96  A/P:  37.1 week GA female  Transition to Regular Nursery under Peds Hospitalist care. Requested by DR Ewing to attend a PC/S of a 26y/o  at 37.1 weeks GA secondary to maternal chorio.......  She had + PNC, is blood type A pos, HIV neg, HBsAg neg, RPR NR, Rubella Imm, GBS neg, COVID19 neg, hx of SVT (S/P ablation in 2017)..  L&D:  Mother admitted to L&D for IOL for GHTN.  AROM aprox 8 hrs PTD with clear fluids.  Mother developed a fever T max 38C, + fetal tachycardia (170's), she received Amp & Clinda PTD., Baby born vertex with vacuum assist., weak cry, transferred to warmer, orally suctioned, dried, and examined. APGAR Score: 9/9. Infant showed to father and then transferred to mother for STS.  BW: 3400g  EOS: 0.96  A/P:  37.1 week GA female  Transition to Regular Nursery under Peds Hospitalist care.

## 2023-01-20 NOTE — OB RN PATIENT PROFILE - PARENTS VERBALIZED UNDERSTANDING OF THE SAFE SKIN TO SKIN POSITIONING OF THE NEWBORN.
Statement Selected

## 2023-01-20 NOTE — OB RN DELIVERY SUMMARY - NS_SEPSISRSKCALC_OBGYN_ALL_OB_FT
EOS calculated successfully. EOS Risk Factor: 0.5/1000 live births ([facility] national incidence); GA=37w1d; Temp=100.4; ROM=7.717; GBS='Negative'; Antibiotics='No antibiotics or any antibiotics < 2 hrs prior to birth'

## 2023-01-20 NOTE — OB PROVIDER LABOR PROGRESS NOTE - NS_SUBJECTIVE/OBJECTIVE_OBGYN_ALL_OB_FT
Patient is having iol for gestational hypertension  FHR: baseline 120bpm, mod variability, +accels, -deccels  [place]: contractions q1-2 minutes    Plan  - will continue pitocin  - aromed, clear fluid
Patient resting comfortably s/p epidural. HR increased to 130-150s s/p epidural persistently for about an hour. She has a history of SVT s/p ablation in 2017 and had HR in 130s during those episodes. She currently denies any chest pain, palpitations. She reports feeling her heart racing.    Gen: NAD, resting comfortably  Car: Regular rhythm, HR 130s on auscultation, S1, S2, no murmurs, no gallops, no rubs
Pt seen and examined for cat 2 tracing. Patient febrile to 38C.
Patient is having iol for gestational hypertension  FHR: baseline 145 bpm, mod variability, +accels, -deccels  [place]: contractions q2-4 minutes    Plan  - will continue pitocin
Pt seen and evaluated at bedside.   Vital Signs Last 24 Hrs  T(C): 38.0 (20 Jan 2023 19:25), Max: 38.0 (20 Jan 2023 19:25)  T(F): 100.4 (20 Jan 2023 19:25), Max: 100.4 (20 Jan 2023 19:25)  HR: 157 (20 Jan 2023 20:40) (92 - 157)  BP: 141/86 (20 Jan 2023 20:40) (119/79 - 151/89)  RR: 18 (20 Jan 2023 19:25) (17 - 18)  SpO2: 98% (20 Jan 2023 20:39) (97% - 100%)

## 2023-01-20 NOTE — OB PROVIDER DELIVERY SUMMARY - NSPROVIDERDELIVERYNOTE_OBGYN_ALL_OB_FT
Viable Female @ 21:30  apgar 9/9  weight  3400 gm  7 lbs  8oz.  Vacuum assisted   One pull  No Pop offs  Placenta to path   cultures  fetal / maternal  Baby assessed by Neonatologist   cc  Maternal  SVT hx ,,,,s/p cardiac ablation  patient stable to RR  Dr DUSTIN Mcmanus  present  Dr. ARMAND Lopez  present

## 2023-01-20 NOTE — OB PROVIDER H&P - ASSESSMENT
Patient is a 27year old  at 37w1d admitted for IOL 2/2 gHTN.  -Admit to L&D  -Consent  -Admission labs, PIH labs  -IV fluids  -Fetus: Cat 1 tracing. Continuous toco and fetal monitoring.   -GBS: Negative, no GBS ppx required   -Analgesia: epi prn  -Induction method: pitocin when contractions space out. plan to AROM    Discussed with Dr. Santillan

## 2023-01-20 NOTE — OB PROVIDER DELIVERY SUMMARY - NSSELHIDDEN_OBGYN_ALL_OB_FT
[NS_DeliveryAttending1_OBGYN_ALL_OB_FT:[id number]==],[NS_DeliveryAttending2_OBGYN_ALL_OB_FT:[id number]==],[NS_DeliveryAssist1_OBGYN_ALL_OB_FT:[id number]],[NS_DeliveryRN_OBGYN_ALL_OB_FT:[id number]==]

## 2023-01-20 NOTE — OB PROVIDER H&P - HISTORY OF PRESENT ILLNESS
Patient is a 27year old  at 37w1d by CRL inconsistent with LMP who presents to L&D for IOL 2/2 gHTN. Denies HA, CP, SOB, RUQ/epigastric pain. Denies contractions, loss of fluid, vaginal bleeding, decreased fetal movement. No complaints.    XOCHITL: 23 by CRL   LMP: 22     Pregnancy course is significant for:   Syncopal episodes in pregnancy   contractions s/p betamethasone   Cardiac arrhythmia s/p neg cardiac workup  gHTN     POB: denies   PGYN: -fibroids/-cysts, denied STD hx, denies abnormal PAPs   PMH: Hx of SVT and hypertension in 2017, chondromalacia of left knee, Hx of spinal meningitis at 2weeks old   PSH: cardiac ablation (), left ankle surgery ()   SH: Denies tobacco use, EtOH use and illicit drug use during the pregnancy; Feels safe at home   Meds: PNVs   All: NKDA     BMI: 32.59   Ultrasound:  cephalic, anterior placenta   EFW:  2268g

## 2023-01-21 ENCOUNTER — NON-APPOINTMENT (OUTPATIENT)
Age: 28
End: 2023-01-21

## 2023-01-21 LAB
BASOPHILS # BLD AUTO: 0.04 K/UL — SIGNIFICANT CHANGE UP (ref 0–0.2)
BASOPHILS NFR BLD AUTO: 0.2 % — SIGNIFICANT CHANGE UP (ref 0–2)
EOSINOPHIL # BLD AUTO: 0 K/UL — SIGNIFICANT CHANGE UP (ref 0–0.5)
EOSINOPHIL NFR BLD AUTO: 0 % — SIGNIFICANT CHANGE UP (ref 0–6)
HCT VFR BLD CALC: 30.5 % — LOW (ref 34.5–45)
HGB BLD-MCNC: 10.1 G/DL — LOW (ref 11.5–15.5)
IMM GRANULOCYTES NFR BLD AUTO: 0.6 % — SIGNIFICANT CHANGE UP (ref 0–0.9)
LYMPHOCYTES # BLD AUTO: 1.56 K/UL — SIGNIFICANT CHANGE UP (ref 1–3.3)
LYMPHOCYTES # BLD AUTO: 7.2 % — LOW (ref 13–44)
MCHC RBC-ENTMCNC: 30.3 PG — SIGNIFICANT CHANGE UP (ref 27–34)
MCHC RBC-ENTMCNC: 33.1 GM/DL — SIGNIFICANT CHANGE UP (ref 32–36)
MCV RBC AUTO: 91.6 FL — SIGNIFICANT CHANGE UP (ref 80–100)
MONOCYTES # BLD AUTO: 1.22 K/UL — HIGH (ref 0–0.9)
MONOCYTES NFR BLD AUTO: 5.6 % — SIGNIFICANT CHANGE UP (ref 2–14)
NEUTROPHILS # BLD AUTO: 18.77 K/UL — HIGH (ref 1.8–7.4)
NEUTROPHILS NFR BLD AUTO: 86.4 % — HIGH (ref 43–77)
PLATELET # BLD AUTO: 181 K/UL — SIGNIFICANT CHANGE UP (ref 150–400)
RBC # BLD: 3.33 M/UL — LOW (ref 3.8–5.2)
RBC # FLD: 13.4 % — SIGNIFICANT CHANGE UP (ref 10.3–14.5)
WBC # BLD: 21.71 K/UL — HIGH (ref 3.8–10.5)
WBC # FLD AUTO: 21.71 K/UL — HIGH (ref 3.8–10.5)

## 2023-01-21 RX ORDER — ENOXAPARIN SODIUM 100 MG/ML
40 INJECTION SUBCUTANEOUS EVERY 24 HOURS
Refills: 0 | Status: DISCONTINUED | OUTPATIENT
Start: 2023-01-21 | End: 2023-01-22

## 2023-01-21 RX ORDER — INFLUENZA VIRUS VACCINE 15; 15; 15; 15 UG/.5ML; UG/.5ML; UG/.5ML; UG/.5ML
0.5 SUSPENSION INTRAMUSCULAR ONCE
Refills: 0 | Status: COMPLETED | OUTPATIENT
Start: 2023-01-21 | End: 2023-01-21

## 2023-01-21 RX ORDER — TETANUS TOXOID, REDUCED DIPHTHERIA TOXOID AND ACELLULAR PERTUSSIS VACCINE, ADSORBED 5; 2.5; 8; 8; 2.5 [IU]/.5ML; [IU]/.5ML; UG/.5ML; UG/.5ML; UG/.5ML
0.5 SUSPENSION INTRAMUSCULAR ONCE
Refills: 0 | Status: COMPLETED | OUTPATIENT
Start: 2023-01-21 | End: 2023-01-21

## 2023-01-21 RX ORDER — ACETAMINOPHEN 500 MG
2 TABLET ORAL
Qty: 40 | Refills: 0
Start: 2023-01-21 | End: 2023-01-25

## 2023-01-21 RX ORDER — IBUPROFEN 200 MG
1 TABLET ORAL
Qty: 20 | Refills: 0
Start: 2023-01-21 | End: 2023-01-25

## 2023-01-21 RX ADMIN — Medication 500 MILLIGRAM(S): at 12:35

## 2023-01-21 RX ADMIN — Medication 100 MILLIGRAM(S): at 21:14

## 2023-01-21 RX ADMIN — INFLUENZA VIRUS VACCINE 0.5 MILLILITER(S): 15; 15; 15; 15 SUSPENSION INTRAMUSCULAR at 21:15

## 2023-01-21 RX ADMIN — Medication 200 GRAM(S): at 08:18

## 2023-01-21 RX ADMIN — ENOXAPARIN SODIUM 40 MILLIGRAM(S): 100 INJECTION SUBCUTANEOUS at 08:53

## 2023-01-21 RX ADMIN — Medication 30 MILLIGRAM(S): at 02:21

## 2023-01-21 RX ADMIN — Medication 100 MILLIGRAM(S): at 06:10

## 2023-01-21 RX ADMIN — Medication 975 MILLIGRAM(S): at 00:47

## 2023-01-21 RX ADMIN — Medication 100 MILLIGRAM(S): at 14:27

## 2023-01-21 RX ADMIN — Medication 200 GRAM(S): at 02:21

## 2023-01-21 RX ADMIN — Medication 1 MILLIGRAM(S): at 12:35

## 2023-01-21 RX ADMIN — Medication 200 GRAM(S): at 19:51

## 2023-01-21 RX ADMIN — Medication 325 MILLIGRAM(S): at 12:34

## 2023-01-21 RX ADMIN — Medication 30 MILLIGRAM(S): at 15:04

## 2023-01-21 RX ADMIN — Medication 975 MILLIGRAM(S): at 12:35

## 2023-01-21 RX ADMIN — Medication 975 MILLIGRAM(S): at 00:17

## 2023-01-21 RX ADMIN — Medication 1 TABLET(S): at 12:35

## 2023-01-21 RX ADMIN — Medication 975 MILLIGRAM(S): at 06:10

## 2023-01-21 RX ADMIN — Medication 30 MILLIGRAM(S): at 08:53

## 2023-01-21 RX ADMIN — Medication 975 MILLIGRAM(S): at 17:27

## 2023-01-21 RX ADMIN — Medication 30 MILLIGRAM(S): at 21:14

## 2023-01-21 RX ADMIN — TETANUS TOXOID, REDUCED DIPHTHERIA TOXOID AND ACELLULAR PERTUSSIS VACCINE, ADSORBED 0.5 MILLILITER(S): 5; 2.5; 8; 8; 2.5 SUSPENSION INTRAMUSCULAR at 21:14

## 2023-01-21 RX ADMIN — Medication 200 GRAM(S): at 13:37

## 2023-01-21 NOTE — DISCHARGE NOTE OB - CARE PLAN
1 Principal Discharge DX:	S/P primary low transverse   Assessment and plan of treatment:	Please call your provider to schedule postoperative wound check visit in 2 weeks. Take medications as directed, regular diet, activity as tolerated. Exclusive breast feeding for the first 6 months is recommended. Nothing per vagina for 6 weeks (incl. sex, douching, etc). If you have additional concerns, please inform your provider.

## 2023-01-21 NOTE — DISCHARGE NOTE OB - CARE PROVIDER_API CALL
Amena Santillan)  Obstetrics and Gynecology  [address] [place] [address]  Phone: [phone]  Fax: [phone]  Follow Up Time: 2 weeks

## 2023-01-21 NOTE — DISCHARGE NOTE OB - PATIENT PORTAL LINK FT
You can access the FollowMyHealth Patient Portal offered by [facility] by registering at the following website: [email or web address]. By joining [email or web address]’s FollowMyHealth portal, you will also be able to view your health information using other applications (apps) compatible with our system.

## 2023-01-21 NOTE — PROGRESS NOTE ADULT - ASSESSMENT
INTERVAL HPI/OVERNIGHT EVENTS:  27y Female s/p c section under epidural anesthesia with duramorph for post op analgesia on 1/20/23    Vital Signs Last 24 Hrs  T(C): 37.1 (21 Jan 2023 05:30), Max: 38.0 (20 Jan 2023 19:25)  T(F): 98.7 (21 Jan 2023 05:30), Max: 100.4 (20 Jan 2023 19:25)  HR: 93 (21 Jan 2023 05:30) (93 - 157)  BP: 110/62 (21 Jan 2023 05:30) (110/62 - 144/76)  BP(mean): 87 (20 Jan 2023 23:20) (87 - 87)  RR: 18 (21 Jan 2023 05:30) (16 - 20)  SpO2: 96% (21 Jan 2023 05:30) (96% - 100%)    Parameters below as of 21 Jan 2023 05:30  Patient On (Oxygen Delivery Method): room air            Patient's overall anesthesia satisfaction: Positive    Patients pain is well controlled with IT duramorph    No respiratory events overnight    No pruritis at this time    Patient seen and doing well     No headache      No residual numbness or weakness, sensory and motor function intact.    No anesthetic complications or complaints noted or reported          .

## 2023-01-21 NOTE — DISCHARGE NOTE OB - NS MD DC FALL RISK RISK
For information on Fall & Injury Prevention, visit: [email or web address] OR  [email or web address] OR  https://www.cdc.gov/steadi/patient.html

## 2023-01-21 NOTE — PROGRESS NOTE ADULT - ASSESSMENT
MELANI GRISSOM is a 27y  now POD#1 s/p primary  section at 37w1d gestation due to persistent category two tracing remote from delivery. Labor complicated by chorioamnionitis. Patient currently on ampicillin, gentamycin, and Clindamycin.    A/P:    -Vital signs stable  -Hgb: 12.2 -> AM labs pending   -Voiding, tolerating PO, bowel function nml   -Advance care as tolerated   -Continue routine postpartum and postoperative care and education  -Healthy female infant  -Dispo: Continue to monitor post partum progress MELANI GRISSOM is a 27y  now POD#1 s/p primary  section at 37w1d gestation due to persistent category two tracing remote from delivery. Labor complicated by chorioamnionitis. Patient currently on ampicillin, gentamycin, and Clindamycin.    A/P:    -Vital signs stable  -Hgb: 12.2 -> AM labs pending   -Tolerating PO, bowel function nml   -Alberts in place, Will be removed this morning followed by TOV  -Advance care as tolerated   -Continue routine postpartum and postoperative care and education  -Healthy female infant  -Dispo: Continue to monitor post partum progress

## 2023-01-21 NOTE — DISCHARGE NOTE OB - HOSPITAL COURSE
Primary  at 37w1d secondary to category II tracing remote from delivery complicated by gHTN and chorioamnionitis s/p ampicillin, gentamycin, and clindamycin. Postpartum pain is well controlled with PRN medication. She has no difficulty with ambulation, voiding or PO intake. Lab values and vital signs are within normal limits prior to discharge.

## 2023-01-21 NOTE — PROGRESS NOTE ADULT - SUBJECTIVE AND OBJECTIVE BOX
POD # 1    Patient was seen this AM ~~ 11:00  Patient was resting in  bed in NAD  Afebrile VSS  Abdomen  soft  not tender  Fundus  Firm  Extrem  No Homans    Not voided yet     Admission  Labs    WBC  10.36    H / H 12.8 / 37.6    Platel  205     VDRL Neg    A +    COVID  Neg  Ab +    19:55      WBC 18.56    H / H 12.2 / 36.9    Platel  191      1/21  06:33    WBC  21.71    H / H  10.1 / 30.5    Platel   181    Patient s/p C/s for Class 2 tracing and developing chorio  Patient with elevated WBC --- on Ab Tx  repeat CBC in AM  maternal Heart rate within nl range

## 2023-01-21 NOTE — PROGRESS NOTE ADULT - SUBJECTIVE AND OBJECTIVE BOX
MELANI GRISSOM is a 27y  now POD#1 s/p primary  section at 37w1d gestation due to persistent category two tracing remote from delivery. Labor complicated by chorioamnionitis. Patient currently on ampicillin, gentamycin, and Clindamycin.    S:    No acute events overnight.   The patient has no complaints.  Pain controlled with current treatment regimen.   She is ambulating without difficulty and tolerating PO.   + flatus/-BM/+ voiding   She endorses appropriate lochia, which is decreasing.   She is breastfeeding without difficulty.   She denies fevers, chills, nausea and vomiting.   She denies lightheadedness, dizziness, palpitations, chest pain, SOB, RUQ pain, blurry vision, new-onset swelling, and urinary symptoms.    O:    T(C): 37.1 (23 @ 05:30), Max: 38.0 (23 @ 19:25)  HR: 93 (23 @ 05:30) (92 - 157)  BP: 110/62 (23 @ 05:30) (110/62 - 151/89)  RR: 18 (23 @ 05:30) (16 - 20)  SpO2: 96% (23 @ 05:30) (96% - 100%)    Gen: NAD, AOx3  CV: RRR, S1/S2 present  Pulm: CTAB  Breast: Nontender, non-engorged   Abdomen:  Soft, non-tender, non-distended, +bowel sounds  Incision: Clean/dry/intact with mepilex in place   Uterus:  Fundus firm below umbilicus  VE:  Expected lochia  Ext:  Non-tender and non-edematous                          12.2   18.56 )-----------( 192      ( 2023 19:55 )             36.9         135  |  100  |  7.1<L>  ----------------------------<  90  3.9   |  21.0<L>  |  0.47<L>    Ca    9.5      2023 08:57    TPro  7.1  /  Alb  3.6  /  TBili  0.3<L>  /  DBili  x   /  AST  23  /  ALT  14  /  AlkPhos  178<H>     MELANI GRISSOM is a 27y  now POD#1 s/p primary  section at 37w1d gestation due to persistent category two tracing remote from delivery. Labor complicated by chorioamnionitis. Patient currently on ampicillin, gentamycin, and Clindamycin.    S:    No acute events overnight.   The patient has no complaints.  Pain controlled with current treatment regimen.   She is ambulating without difficulty and tolerating PO.   + flatus/-BM  She endorses appropriate lochia, which is decreasing.   She is breastfeeding without difficulty.   She denies fevers, chills, nausea and vomiting.   She denies lightheadedness, dizziness, palpitations, chest pain, SOB, RUQ pain, blurry vision, new-onset swelling, and urinary symptoms.    O:    T(C): 37.1 (23 @ 05:30), Max: 38.0 (23 @ 19:25)  HR: 93 (23 @ 05:30) (92 - 157)  BP: 110/62 (23 @ 05:30) (110/62 - 151/89)  RR: 18 (23 @ 05:30) (16 - 20)  SpO2: 96% (23 @ 05:30) (96% - 100%)    Gen: NAD, AOx3  CV: RRR, S1/S2 present  Pulm: CTAB  Breast: Nontender, non-engorged   Abdomen:  Soft, non-tender, non-distended, +bowel sounds  Incision: Clean/dry/intact with mepilex in place   Uterus:  Fundus firm below umbilicus  VE:  Expected lochia  Ext:  Non-tender and non-edematous  : chaney in place with clear yellow urine                          12.2   18.56 )-----------( 192      ( 2023 19:55 )             36.9     -    135  |  100  |  7.1<L>  ----------------------------<  90  3.9   |  21.0<L>  |  0.47<L>    Ca    9.5      2023 08:57    TPro  7.1  /  Alb  3.6  /  TBili  0.3<L>  /  DBili  x   /  AST  23  /  ALT  14  /  AlkPhos  178<H>  -20

## 2023-01-22 VITALS
OXYGEN SATURATION: 98 % | HEART RATE: 88 BPM | SYSTOLIC BLOOD PRESSURE: 136 MMHG | DIASTOLIC BLOOD PRESSURE: 85 MMHG | RESPIRATION RATE: 16 BRPM | TEMPERATURE: 98 F

## 2023-01-22 LAB
HCT VFR BLD CALC: 31.9 % — LOW (ref 34.5–45)
HGB BLD-MCNC: 10.5 G/DL — LOW (ref 11.5–15.5)
MCHC RBC-ENTMCNC: 30.1 PG — SIGNIFICANT CHANGE UP (ref 27–34)
MCHC RBC-ENTMCNC: 32.9 GM/DL — SIGNIFICANT CHANGE UP (ref 32–36)
MCV RBC AUTO: 91.4 FL — SIGNIFICANT CHANGE UP (ref 80–100)
PLATELET # BLD AUTO: 188 K/UL — SIGNIFICANT CHANGE UP (ref 150–400)
RBC # BLD: 3.49 M/UL — LOW (ref 3.8–5.2)
RBC # FLD: 13.8 % — SIGNIFICANT CHANGE UP (ref 10.3–14.5)
WBC # BLD: 15.89 K/UL — HIGH (ref 3.8–10.5)
WBC # FLD AUTO: 15.89 K/UL — HIGH (ref 3.8–10.5)

## 2023-01-22 PROCEDURE — 83615 LACTATE (LD) (LDH) ENZYME: CPT

## 2023-01-22 PROCEDURE — 81001 URINALYSIS AUTO W/SCOPE: CPT

## 2023-01-22 PROCEDURE — 93005 ELECTROCARDIOGRAM TRACING: CPT

## 2023-01-22 PROCEDURE — 36415 COLL VENOUS BLD VENIPUNCTURE: CPT

## 2023-01-22 PROCEDURE — 88305 TISSUE EXAM BY PATHOLOGIST: CPT

## 2023-01-22 PROCEDURE — 86769 SARS-COV-2 COVID-19 ANTIBODY: CPT

## 2023-01-22 PROCEDURE — 86850 RBC ANTIBODY SCREEN: CPT

## 2023-01-22 PROCEDURE — 85027 COMPLETE CBC AUTOMATED: CPT

## 2023-01-22 PROCEDURE — 90715 TDAP VACCINE 7 YRS/> IM: CPT

## 2023-01-22 PROCEDURE — 87070 CULTURE OTHR SPECIMN AEROBIC: CPT

## 2023-01-22 PROCEDURE — 85730 THROMBOPLASTIN TIME PARTIAL: CPT

## 2023-01-22 PROCEDURE — 84550 ASSAY OF BLOOD/URIC ACID: CPT

## 2023-01-22 PROCEDURE — U0005: CPT

## 2023-01-22 PROCEDURE — 82570 ASSAY OF URINE CREATININE: CPT

## 2023-01-22 PROCEDURE — U0003: CPT

## 2023-01-22 PROCEDURE — 90686 IIV4 VACC NO PRSV 0.5 ML IM: CPT

## 2023-01-22 PROCEDURE — 86901 BLOOD TYPING SEROLOGIC RH(D): CPT

## 2023-01-22 PROCEDURE — 85025 COMPLETE CBC W/AUTO DIFF WBC: CPT

## 2023-01-22 PROCEDURE — 80053 COMPREHEN METABOLIC PANEL: CPT

## 2023-01-22 PROCEDURE — 85384 FIBRINOGEN ACTIVITY: CPT

## 2023-01-22 PROCEDURE — 87077 CULTURE AEROBIC IDENTIFY: CPT

## 2023-01-22 PROCEDURE — 86780 TREPONEMA PALLIDUM: CPT

## 2023-01-22 PROCEDURE — 85610 PROTHROMBIN TIME: CPT

## 2023-01-22 PROCEDURE — 86900 BLOOD TYPING SEROLOGIC ABO: CPT

## 2023-01-22 PROCEDURE — 87075 CULTR BACTERIA EXCEPT BLOOD: CPT

## 2023-01-22 PROCEDURE — 84156 ASSAY OF PROTEIN URINE: CPT

## 2023-01-22 RX ORDER — IBUPROFEN 200 MG
600 TABLET ORAL EVERY 6 HOURS
Refills: 0 | Status: DISCONTINUED | OUTPATIENT
Start: 2023-01-22 | End: 2023-01-22

## 2023-01-22 RX ORDER — CEPHALEXIN 500 MG
1 CAPSULE ORAL
Qty: 28 | Refills: 0
Start: 2023-01-22 | End: 2023-01-28

## 2023-01-22 RX ORDER — CEPHALEXIN 500 MG
250 CAPSULE ORAL EVERY 6 HOURS
Refills: 0 | Status: DISCONTINUED | OUTPATIENT
Start: 2023-01-22 | End: 2023-01-22

## 2023-01-22 RX ADMIN — Medication 975 MILLIGRAM(S): at 00:00

## 2023-01-22 RX ADMIN — Medication 1 MILLIGRAM(S): at 11:30

## 2023-01-22 RX ADMIN — Medication 325 MILLIGRAM(S): at 11:30

## 2023-01-22 RX ADMIN — Medication 600 MILLIGRAM(S): at 02:02

## 2023-01-22 RX ADMIN — ENOXAPARIN SODIUM 40 MILLIGRAM(S): 100 INJECTION SUBCUTANEOUS at 08:43

## 2023-01-22 RX ADMIN — Medication 500 MILLIGRAM(S): at 11:30

## 2023-01-22 RX ADMIN — Medication 975 MILLIGRAM(S): at 05:31

## 2023-01-22 RX ADMIN — Medication 600 MILLIGRAM(S): at 08:43

## 2023-01-22 RX ADMIN — Medication 1 TABLET(S): at 11:29

## 2023-01-22 RX ADMIN — Medication 975 MILLIGRAM(S): at 11:31

## 2023-01-22 NOTE — PROGRESS NOTE ADULT - ASSESSMENT
MELANI GRISSOM is a 27y  now POD#2 s/p primary  section at 37w1d gestation due to persistent category two tracing remote from delivery. Labor complicated by chorioamnionitis. S/p ampicillin, gentamycin, and Clindamycin.    -Vital signs stable  -Hgb:12.8>101  -Voiding, tolerating PO, bowel function nml   -Advance care as tolerated   -Continue routine postpartum and postoperative care and education  -Healthy female infant  -Dispo: Pt is stable, doing well and meeting all postpartum and postoperative milestones. Possible discharge to home today pending attending approval.

## 2023-01-22 NOTE — PROGRESS NOTE ADULT - SUBJECTIVE AND OBJECTIVE BOX
patient resting comfortably in chair  Afebrile VSS  Abdomen   soft  not tender  Fundus  firm  Incision  dressing in place  dry  Extrem  No Homans  Lochia  NL    Flatus +  Voiding +    WBC  15.89  H / H  10.5 / 31.9  Platel  188    Patient stable and doing well   Responded well to Ab Tx      Cpntinue po x 7 days   F/U office 2 days for dressing  Instruction given

## 2023-01-22 NOTE — PROGRESS NOTE ADULT - SUBJECTIVE AND OBJECTIVE BOX
MELANI GRISSOM is a 27y  now POD#2 s/p primary  section at 37w1d gestation due to persistent category two tracing remote from delivery. Labor complicated by chorioamnionitis. S/p ampicillin, gentamycin, and Clindamycin.    S:    No acute events overnight.   The patient has no complaints.  Pain controlled with current treatment regimen.   She is ambulating without difficulty and tolerating PO.   + flatus/-BM/+ voiding   She endorses appropriate lochia, which is decreasing.   She is breastfeeding without difficulty.   She denies fevers, chills, nausea and vomiting.   She denies lightheadedness, dizziness, palpitations, chest pain and SOB.     O:    T(C): 36.7 (23 @ 20:25), Max: 37.2 (23 @ 08:30)  HR: 104 (23 @ 20:25) (89 - 104)  BP: 98/60 (23 @ 20:25) (98/60 - 123/79)  RR: 17 (23 @ 20:25) (17 - 18)  SpO2: 96% (23 @ 20:25) (96% - 98%)    Gen: NAD, AOx3  CV: RRR, S1/S2 present  Pulm: CTAB  Breast: Nontender, non-engorged   Abdomen:  Soft, non-tender, non-distended  Incision: Clean/dry/intact with mepilex in place   Uterus:  Fundus firm below umbilicus  VE:  Expectant lochia  Ext:  Non-tender and non-edematous

## 2023-01-22 NOTE — PROGRESS NOTE ADULT - ATTENDING COMMENTS
I agree with the documentation above.  In short, patient is a 27y  now POD#2 s/p primary  section at 37w1d gestation due to persistent category two tracing remote from delivery. Labor complicated by chorioamnionitis. S/p ampicillin, gentamycin, and Clindamycin.  She is doing well this morning with no specific concerns. She remains afebrile.  Continue routine postpartum care.    Madhav Porter MD

## 2023-01-24 ENCOUNTER — APPOINTMENT (OUTPATIENT)
Dept: OBGYN | Facility: CLINIC | Age: 28
End: 2023-01-24
Payer: COMMERCIAL

## 2023-01-24 VITALS
BODY MASS INDEX: 31.54 KG/M2 | SYSTOLIC BLOOD PRESSURE: 133 MMHG | HEIGHT: 63 IN | HEART RATE: 96 BPM | WEIGHT: 178 LBS | DIASTOLIC BLOOD PRESSURE: 85 MMHG

## 2023-01-24 PROCEDURE — 0503F POSTPARTUM CARE VISIT: CPT

## 2023-01-24 NOTE — HISTORY OF PRESENT ILLNESS
[Postpartum Follow Up] : postpartum follow up [Complications:___] : no complications [Delivery Date: ___] : on [unfilled] [Primary C/S] : delivered by  section [Female] : Delivery History: baby girl [Wt. ___] : weighing [unfilled] [Breastfeeding] : not currently nursing [Clean/Dry/Intact] : clean, dry and intact [Erythema] : not erythematous [Swelling] : not swollen [Dehiscence] : not dehisced [Healed] : healed [None] : no vaginal bleeding [Cervix Sample Taken] : cervical sample not taken for a Pap smear [Not Done] : Examination of breasts not done [Doing Well] : is doing well [No Sign of Infection] : is showing no signs of infection [Excellent Pain Control] : has excellent pain control [de-identified] : Mepilex dressing removed,, follow-up 2 weeks [FreeTextEntry1] : Patient is a 27-year-old  1 para 1 status post primary  section on 2023 with delivery of a viable female infant Apgars 9 and 9 weighing 7 pounds 8 ounces\par Patient developed choriomeningitis during induction with elevated fetal heart rate and elevated maternal WBC\par Patient presents for follow-up and dressing removal\par Patient states that she stopped breast-feeding\par Patient denies any problems with passing flatus, voiding, or bowel movements,\par Mepilex dressing removed,,, incision clean dry and intact\par Follow-up 2 weeks

## 2023-01-26 LAB
CULTURE RESULTS: SIGNIFICANT CHANGE UP
CULTURE RESULTS: SIGNIFICANT CHANGE UP
SPECIMEN SOURCE: SIGNIFICANT CHANGE UP
SPECIMEN SOURCE: SIGNIFICANT CHANGE UP

## 2023-01-27 LAB — SURGICAL PATHOLOGY STUDY: SIGNIFICANT CHANGE UP

## 2023-01-30 ENCOUNTER — APPOINTMENT (OUTPATIENT)
Dept: ANTEPARTUM | Facility: CLINIC | Age: 28
End: 2023-01-30

## 2023-02-07 ENCOUNTER — APPOINTMENT (OUTPATIENT)
Dept: OBGYN | Facility: CLINIC | Age: 28
End: 2023-02-07
Payer: COMMERCIAL

## 2023-02-07 VITALS
DIASTOLIC BLOOD PRESSURE: 76 MMHG | SYSTOLIC BLOOD PRESSURE: 120 MMHG | HEART RATE: 76 BPM | HEIGHT: 63 IN | BODY MASS INDEX: 29.41 KG/M2 | WEIGHT: 166 LBS

## 2023-02-07 PROCEDURE — 0503F POSTPARTUM CARE VISIT: CPT

## 2023-02-07 NOTE — HISTORY OF PRESENT ILLNESS
[Postpartum Follow Up] : postpartum follow up [Complications:___] : no complications [Delivery Date: ___] : on [unfilled] [Primary C/S] : delivered by  section [Female] : Delivery History: baby girl [Wt. ___] : weighing [unfilled] [Breastfeeding] : not currently nursing [None] : No associated symptoms are reported [Clean/Dry/Intact] : clean, dry and intact [Erythema] : not erythematous [Swelling] : not swollen [Dehiscence] : not dehisced [Healed] : healed [Cervix Sample Taken] : cervical sample not taken for a Pap smear [Not Done] : Examination of breasts not done [de-identified] : benign [FreeTextEntry1] : Patient's status post  section on 2023 for a viable female weighing 7 pounds 8 ounces\par Patient denies breast-feeding\par Patient denies any difficulty voiding, passing flatus, or bowel movements,\par Decision clean dry and intact well-healed\par No complaints\par Follow-up in 4 weeks\par We will discuss in the future possible third Provera injection

## 2023-03-07 ENCOUNTER — APPOINTMENT (OUTPATIENT)
Dept: OBGYN | Facility: CLINIC | Age: 28
End: 2023-03-07
Payer: COMMERCIAL

## 2023-03-07 VITALS
SYSTOLIC BLOOD PRESSURE: 132 MMHG | HEART RATE: 72 BPM | DIASTOLIC BLOOD PRESSURE: 89 MMHG | BODY MASS INDEX: 30.29 KG/M2 | WEIGHT: 171 LBS

## 2023-03-07 PROCEDURE — 0503F POSTPARTUM CARE VISIT: CPT

## 2023-03-07 NOTE — HISTORY OF PRESENT ILLNESS
[Delivery Date: ___] : on [unfilled] [Primary C/S] : delivered by  section [Female] : Delivery History: baby girl [Wt. ___] : weighing [unfilled] [Breastfeeding] : not currently nursing [Clean/Dry/Intact] : clean, dry and intact [Erythema] : not erythematous [Swelling] : not swollen [Dehiscence] : not dehisced [Healed] : healed [Back to Normal] : is back to normal in size [None] : no vaginal bleeding [Normal] : the vagina was normal [Cervix Sample Taken] : cervical sample not taken for a Pap smear [Not Done] : Examination of breasts not done [Doing Well] : is doing well [No Sign of Infection] : is showing no signs of infection [Excellent Pain Control] : has excellent pain control [de-identified] : Benign [FreeTextEntry1] : Patient is a 27-year-old  1 para 1 status post  section on 2023\par Patient delivered a female infant weighing 7 pounds 9 ounces\par Patient states she is not breast-feeding\par Patient denies any difficulty voiding, passing flatus, or bowel movements,\par Patient does not desire any contraception at this point\par Pelvic exam shows normal female external genitalia, vagina no lesions, cervix appropriate size nontender, uterus anteverted normal size nontender, adnexa no masses\par Essentially benign exam\par Follow-up 6 months or prior to that as needed

## 2023-03-30 ENCOUNTER — APPOINTMENT (OUTPATIENT)
Dept: OBGYN | Facility: CLINIC | Age: 28
End: 2023-03-30
Payer: COMMERCIAL

## 2023-03-30 ENCOUNTER — ASOB RESULT (OUTPATIENT)
Age: 28
End: 2023-03-30

## 2023-03-30 ENCOUNTER — TRANSCRIPTION ENCOUNTER (OUTPATIENT)
Age: 28
End: 2023-03-30

## 2023-03-30 VITALS
DIASTOLIC BLOOD PRESSURE: 85 MMHG | WEIGHT: 173 LBS | SYSTOLIC BLOOD PRESSURE: 137 MMHG | HEART RATE: 73 BPM | BODY MASS INDEX: 30.65 KG/M2

## 2023-03-30 DIAGNOSIS — O35.1XX0 MATERNAL CARE FOR (SUSPECTED) CHROMOSOMAL ABNORMALITY IN FETUS, NOT APPLICABLE OR UNSPECIFIED: ICD-10-CM

## 2023-03-30 DIAGNOSIS — O36.8390 MATERNAL CARE FOR ABNORMALITIES OF THE FETAL HEART RATE OR RHYTHM, UNSPECIFIED TRIMESTER, NOT APPLICABLE OR UNSPECIFIED: ICD-10-CM

## 2023-03-30 DIAGNOSIS — O13.3 GESTATIONAL [PREGNANCY-INDUCED] HYPERTENSION W/OUT SIGNIFICANT PROTEINURIA, THIRD TRIMESTER: ICD-10-CM

## 2023-03-30 PROCEDURE — 76856 US EXAM PELVIC COMPLETE: CPT | Mod: 59

## 2023-03-30 PROCEDURE — 76830 TRANSVAGINAL US NON-OB: CPT

## 2023-03-30 PROCEDURE — 99214 OFFICE O/P EST MOD 30 MIN: CPT

## 2023-03-30 NOTE — HISTORY OF PRESENT ILLNESS
[Postpartum Follow Up] : postpartum follow up [FreeTextEntry1] : Patient is a 27-year-old  1 para 1 status post  section on 2023 for a at 37 to 1-day for history of induction PIH history of SVT and fetal tachycardia with a category 2 tracing\par Patient presents complaining of vaginal bleeding with increased flow and some bright red blood flow over the past 24 hours\par Patient underwent a pelvic sonogram\par Uterus 7.71 x 5.91 x 4.45\par Cervical length 2.61 cm\par Endometrial thickness 2.54 cm\par Right ovary two 3.28 x 1.64 x 2.07\par Left ovary 2.56 x 1.5 x 1.83\par Both ovaries appear to be normal\par Endometrial lining with heterogeneous and hyperechoic hypervascular structure with complex axis within the cavity suggestive of possible retained products conception\par Ovaries appear to be normal\par No adnexal masses seen\par Results discussed with patient\par Patient will be admitted for endometrial suction curettage through the emergency room due to this is most rapid course to treat the patient appropriately\par

## 2023-03-31 ENCOUNTER — INPATIENT (INPATIENT)
Facility: HOSPITAL | Age: 28
LOS: 0 days | Discharge: HOME | DRG: 769 | End: 2023-03-31
Attending: STUDENT IN AN ORGANIZED HEALTH CARE EDUCATION/TRAINING PROGRAM | Admitting: OBSTETRICS & GYNECOLOGY
Payer: COMMERCIAL

## 2023-03-31 ENCOUNTER — TRANSCRIPTION ENCOUNTER (OUTPATIENT)
Age: 28
End: 2023-03-31

## 2023-03-31 ENCOUNTER — RESULT REVIEW (OUTPATIENT)
Age: 28
End: 2023-03-31

## 2023-03-31 VITALS
HEART RATE: 65 BPM | OXYGEN SATURATION: 100 % | HEIGHT: 63 IN | SYSTOLIC BLOOD PRESSURE: 133 MMHG | DIASTOLIC BLOOD PRESSURE: 74 MMHG | RESPIRATION RATE: 16 BRPM | WEIGHT: 169.98 LBS | TEMPERATURE: 98 F

## 2023-03-31 DIAGNOSIS — Z98.890 OTHER SPECIFIED POSTPROCEDURAL STATES: Chronic | ICD-10-CM

## 2023-03-31 DIAGNOSIS — N93.9 ABNORMAL UTERINE AND VAGINAL BLEEDING, UNSPECIFIED: ICD-10-CM

## 2023-03-31 DIAGNOSIS — Z98.891 HISTORY OF UTERINE SCAR FROM PREVIOUS SURGERY: Chronic | ICD-10-CM

## 2023-03-31 LAB
ANION GAP SERPL CALC-SCNC: 11 MMOL/L — SIGNIFICANT CHANGE UP (ref 5–17)
APTT BLD: 32.9 SEC — SIGNIFICANT CHANGE UP (ref 27.5–35.5)
BLD GP AB SCN SERPL QL: SIGNIFICANT CHANGE UP
BUN SERPL-MCNC: 18.3 MG/DL — SIGNIFICANT CHANGE UP (ref 8–20)
CALCIUM SERPL-MCNC: 9.4 MG/DL — SIGNIFICANT CHANGE UP (ref 8.4–10.5)
CHLORIDE SERPL-SCNC: 106 MMOL/L — SIGNIFICANT CHANGE UP (ref 96–108)
CO2 SERPL-SCNC: 24 MMOL/L — SIGNIFICANT CHANGE UP (ref 22–29)
CREAT SERPL-MCNC: 0.69 MG/DL — SIGNIFICANT CHANGE UP (ref 0.5–1.3)
EGFR: 122 ML/MIN/1.73M2 — SIGNIFICANT CHANGE UP
FLUAV AG NPH QL: SIGNIFICANT CHANGE UP
FLUBV AG NPH QL: SIGNIFICANT CHANGE UP
GLUCOSE SERPL-MCNC: 112 MG/DL — HIGH (ref 70–99)
HCG SERPL-ACNC: 9.7 MIU/ML — HIGH
HCT VFR BLD CALC: 40.6 % — SIGNIFICANT CHANGE UP (ref 34.5–45)
HGB BLD-MCNC: 13.5 G/DL — SIGNIFICANT CHANGE UP (ref 11.5–15.5)
INR BLD: 0.97 RATIO — SIGNIFICANT CHANGE UP (ref 0.88–1.16)
MCHC RBC-ENTMCNC: 30 PG — SIGNIFICANT CHANGE UP (ref 27–34)
MCHC RBC-ENTMCNC: 33.3 GM/DL — SIGNIFICANT CHANGE UP (ref 32–36)
MCV RBC AUTO: 90.2 FL — SIGNIFICANT CHANGE UP (ref 80–100)
PLATELET # BLD AUTO: 261 K/UL — SIGNIFICANT CHANGE UP (ref 150–400)
POTASSIUM SERPL-MCNC: 4.5 MMOL/L — SIGNIFICANT CHANGE UP (ref 3.5–5.3)
POTASSIUM SERPL-SCNC: 4.5 MMOL/L — SIGNIFICANT CHANGE UP (ref 3.5–5.3)
PROTHROM AB SERPL-ACNC: 11.2 SEC — SIGNIFICANT CHANGE UP (ref 10.5–13.4)
RBC # BLD: 4.5 M/UL — SIGNIFICANT CHANGE UP (ref 3.8–5.2)
RBC # FLD: 12.3 % — SIGNIFICANT CHANGE UP (ref 10.3–14.5)
RSV RNA NPH QL NAA+NON-PROBE: SIGNIFICANT CHANGE UP
SARS-COV-2 RNA SPEC QL NAA+PROBE: SIGNIFICANT CHANGE UP
SODIUM SERPL-SCNC: 141 MMOL/L — SIGNIFICANT CHANGE UP (ref 135–145)
WBC # BLD: 7.38 K/UL — SIGNIFICANT CHANGE UP (ref 3.8–10.5)
WBC # FLD AUTO: 7.38 K/UL — SIGNIFICANT CHANGE UP (ref 3.8–10.5)

## 2023-03-31 PROCEDURE — 59812 TREATMENT OF MISCARRIAGE: CPT

## 2023-03-31 PROCEDURE — 88305 TISSUE EXAM BY PATHOLOGIST: CPT

## 2023-03-31 PROCEDURE — 85610 PROTHROMBIN TIME: CPT

## 2023-03-31 PROCEDURE — 99285 EMERGENCY DEPT VISIT HI MDM: CPT

## 2023-03-31 PROCEDURE — 86900 BLOOD TYPING SEROLOGIC ABO: CPT

## 2023-03-31 PROCEDURE — 88305 TISSUE EXAM BY PATHOLOGIST: CPT | Mod: 26

## 2023-03-31 PROCEDURE — 86901 BLOOD TYPING SEROLOGIC RH(D): CPT

## 2023-03-31 PROCEDURE — 85730 THROMBOPLASTIN TIME PARTIAL: CPT

## 2023-03-31 PROCEDURE — 85027 COMPLETE CBC AUTOMATED: CPT

## 2023-03-31 PROCEDURE — 84702 CHORIONIC GONADOTROPIN TEST: CPT

## 2023-03-31 PROCEDURE — 87637 SARSCOV2&INF A&B&RSV AMP PRB: CPT

## 2023-03-31 PROCEDURE — 86850 RBC ANTIBODY SCREEN: CPT

## 2023-03-31 PROCEDURE — 99285 EMERGENCY DEPT VISIT HI MDM: CPT | Mod: 25

## 2023-03-31 PROCEDURE — 36415 COLL VENOUS BLD VENIPUNCTURE: CPT

## 2023-03-31 PROCEDURE — 80048 BASIC METABOLIC PNL TOTAL CA: CPT

## 2023-03-31 RX ORDER — ACETAMINOPHEN 500 MG
2 TABLET ORAL
Qty: 40 | Refills: 0
Start: 2023-03-31 | End: 2023-04-04

## 2023-03-31 RX ORDER — FENTANYL CITRATE 50 UG/ML
25 INJECTION INTRAVENOUS
Refills: 0 | Status: DISCONTINUED | OUTPATIENT
Start: 2023-03-31 | End: 2023-03-31

## 2023-03-31 RX ORDER — KETOROLAC TROMETHAMINE 30 MG/ML
15 SYRINGE (ML) INJECTION ONCE
Refills: 0 | Status: DISCONTINUED | OUTPATIENT
Start: 2023-03-31 | End: 2023-03-31

## 2023-03-31 RX ORDER — ACETAMINOPHEN 500 MG
1000 TABLET ORAL ONCE
Refills: 0 | Status: DISCONTINUED | OUTPATIENT
Start: 2023-03-31 | End: 2023-03-31

## 2023-03-31 RX ORDER — SODIUM CHLORIDE 9 MG/ML
1000 INJECTION, SOLUTION INTRAVENOUS
Refills: 0 | Status: DISCONTINUED | OUTPATIENT
Start: 2023-03-31 | End: 2023-03-31

## 2023-03-31 RX ORDER — IBUPROFEN 200 MG
1 TABLET ORAL
Qty: 20 | Refills: 0
Start: 2023-03-31 | End: 2023-04-04

## 2023-03-31 RX ORDER — METRONIDAZOLE 500 MG
1 TABLET ORAL
Qty: 10 | Refills: 0
Start: 2023-03-31 | End: 2023-04-04

## 2023-03-31 RX ORDER — CEFAZOLIN SODIUM 1 G
2000 VIAL (EA) INJECTION ONCE
Refills: 0 | Status: DISCONTINUED | OUTPATIENT
Start: 2023-03-31 | End: 2023-03-31

## 2023-03-31 RX ORDER — TRANEXAMIC ACID 100 MG/ML
1000 INJECTION, SOLUTION INTRAVENOUS ONCE
Refills: 0 | Status: DISCONTINUED | OUTPATIENT
Start: 2023-03-31 | End: 2023-03-31

## 2023-03-31 RX ADMIN — SODIUM CHLORIDE 125 MILLILITER(S): 9 INJECTION, SOLUTION INTRAVENOUS at 13:11

## 2023-03-31 NOTE — BRIEF OPERATIVE NOTE - NSICDXBRIEFPREOP_GEN_ALL_CORE_FT
PRE-OP DIAGNOSIS:  Menorrhagia with regular cycle 31-Mar-2023 21:27:00  Amena Santillan  Retained portions of placenta or membranes 31-Mar-2023 21:27:47  Amena Santillan

## 2023-03-31 NOTE — H&P ADULT - NSHPLABSRESULTS_GEN_ALL_CORE
LABS:                        13.5   7.38  )-----------( 261      ( 31 Mar 2023 08:45 )             40.6     03-31    141  |  106  |  18.3  ----------------------------<  112<H>  4.5   |  24.0  |  0.69    Ca    9.4      31 Mar 2023 08:45        HCG Quantitative, Serum: 9.7 mIU/mL (03-31-23 @ 08:45)    ABO RH Interpretation: A POS (03-31-23 @ 08:45)      RADIOLOGY STUDIES:  In office ultrasound- 2.5cm endometrial thickness with clots LABS:                        13.5   7.38  )-----------( 261      ( 31 Mar 2023 08:45 )             40.6     03-31    141  |  106  |  18.3  ----------------------------<  112<H>  4.5   |  24.0  |  0.69    Ca    9.4      31 Mar 2023 08:45        HCG Quantitative, Serum: 9.7 mIU/mL (03-31-23 @ 08:45)    ABO RH Interpretation: A POS (03-31-23 @ 08:45)      RADIOLOGY STUDIES:  In office ultrasound- 2.5cm endometrial thickness; heterogenous with increased vascularity suspicious for retained products of conception. (report in [employer] )

## 2023-03-31 NOTE — BRIEF OPERATIVE NOTE - NSICDXBRIEFPOSTOP_GEN_ALL_CORE_FT
POST-OP DIAGNOSIS:  Fibroids, submucosal 31-Mar-2023 21:28:27  Amena Santillan  Retained placenta, postpartum condition 31-Mar-2023 21:29:20  Amena Santillan

## 2023-03-31 NOTE — ED PROVIDER NOTE - CLINICAL SUMMARY MEDICAL DECISION MAKING FREE TEXT BOX
ASSESSMENT:   MELANI GRISSOM is a 28yo F who was sent in by OB/GYN for vaginal bleeding. Vitals stable. Physical w/o significant findings. Plan for OR today. Pre-op labs ordered.       PLAN: Pre-op labs, admit to ob/gyn service.

## 2023-03-31 NOTE — ED PROVIDER NOTE - OBJECTIVE STATEMENT
MELANI GRISSOM is a 28yo Female with PMH HLD, afib s/p ablation c/o vaginal bleeding. PT sent in by ob/gyn for D&C in OR this afternoon. Pt endorses nausea but denies pain. Attributes nausea to not eating in preparation for OR this afternoon.  PT denies any symptoms of cp/sob, fevers, chills, vomiting, abdominal pain, urinary symptoms, weakness, confusion.

## 2023-03-31 NOTE — ED PROVIDER NOTE - IV ALTEPLASE EXCL ABS HIDDEN
Fax received Medication to go through [facility]. Rx resent to [facility]    show

## 2023-03-31 NOTE — ASU PREOP CHECKLIST - HEART RATE (BEATS/MIN)
CM note: covid not tested. Met with patient for transition of care planning, pt lives with good friend, is independent with ADLs, still drives, has a walker and hx of [facility]. No anticipated discharge needs. 88

## 2023-03-31 NOTE — H&P ADULT - NSICDXPASTSURGICALHX_GEN_ALL_CORE_FT
PAST SURGICAL HISTORY:  H/O  section     H/O prior ablation treatment     History of ankle surgery

## 2023-03-31 NOTE — ED PROVIDER NOTE - PHYSICAL EXAMINATION
VITAL SIGNS: I have reviewed vital signs  CONSTITUTIONAL:  in no acute distress.  SKIN: Warm, dry, no rash.  HEAD: Normocephalic, atraumatic.  EYES: PERRL, conjunctiva and sclera clear.  ENT: pink & moist mucosa, no pharyngeal erythema  NECK: Supple, non tender. No cervical lymphadenopathy.  CARD: Regular rate and rhythm. No murmurs.   RESP: No wheezes, rales or rhonchi.   ABD:  soft, non-distended, non-tender.   : Deferred.   MSK:  Good ROM in upper/lower extremities w/o pain.   NEURO: Alert, oriented. Grossly unremarkable. No focal deficits.   PSYCH: Cooperative, alert & oriented x3

## 2023-03-31 NOTE — H&P ADULT - NSHPREVIEWOFSYSTEMS_GEN_ALL_CORE
ROS:  CONSTITUTIONAL: No fever, weight loss, or fatigue  BREASTS: No pain, masses, or nipple discharge  RESPIRATORY: No shortness of breath  CARDIOVASCULAR: No chest pain, palpitations, dizziness, or leg swelling  GASTROINTESTINAL: As per HPI  GENITOURINARY: No dysuria or incontinence  SKIN: No itching, burning, rashes, or lesions   ENDOCRINE: No heat or cold intolerance; No hair loss  PSYCHIATRIC: No depression, anxiety, mood swings, or difficulty sleeping  HEME/LYMPH: No easy bruising, or bleeding gums

## 2023-03-31 NOTE — H&P ADULT - ASSESSMENT
27y  10wks s/p pCS at 37wks admitted for suction D&C under ultrasound guidance for suspected retained products of conception    - patient hemodynamically stable, afebrile  - persistent vaginal bleeding x 10 weeks with thickened endometrium on office sono suspicious for retained products of conception  - Admit to gyn service under Dr. Santillan  - NPO since 8pm yesterday, continue NPO status  - Patient added on to OR schedule for suction D&C under ultrasound guidance  - Patient typed and crossed for 2u pRBCs which are on hold for OR  - Will give cytotec, TXA and doxycycline pre-operatively  - gyn oncology team on standby in case of severe hemorrhage  - Plan to have Bakri balloon and LUCHO device in the OR in case of hemorrhage    Discussed with Dr. Santillan

## 2023-03-31 NOTE — H&P ADULT - HISTORY OF PRESENT ILLNESS
Name: MELANI GRISSOM  MRN: [id number]    MELANI GRISSOM is a 27y  10wks s/p pCS at 37wks during IOL for gHTN 2/2 chorioamnionitis and persistent cat 2 tracing sent from her doctor's office for persistent vaginal bleeding.    Patient states she has had continued daily vaginal bleeding since her delivery. She states that it is sometime heavy with clots and other times it is less heavy. She states she has not had a single day without vaginal bleeding. She denies fever, chills, nausea and vomiting. She is exclusively bottle feeding. She reports occasional cramping and some deep abdominal tenderness, but denies problems with  scar or healing     PAST OBSTETRICAL HISTORY: s/p pCS 23, complicated by gHTN    PAST GYN HISTORY: Denies history of abnormal paps, STDs, ovarian cysts, or uterine fibroids.     PAST MEDICAL HISTORY:  H/O hypercholesterolemia  Hx of viral meningitis (as an infant)  Hypothyroid  Atrial fibrillation s/p ablation      PAST SURGICAL HISTORY:  H/O prior cardiac ablation treatment for a.fib  left ankle surgery  CS x 1      Home Medications:  None      SOCIAL:  Denies tobacco or drug use. Social drinker. Feels safe at home.     HOSPITAL MEDS:  lactated ringers. 1000 milliLiter(s) IV Continuous <Continuous>  misoprostol 400 MICROGram(s) Buccal once    No Known Allergies

## 2023-03-31 NOTE — H&P ADULT - NSHPPHYSICALEXAM_GEN_ALL_CORE
Vital Signs Last 24 Hrs  T(C): 36.4 (31 Mar 2023 11:09), Max: 36.6 (31 Mar 2023 07:59)  T(F): 97.5 (31 Mar 2023 11:09), Max: 97.9 (31 Mar 2023 07:59)  HR: 64 (31 Mar 2023 11:09) (64 - 65)  BP: 133/74 (31 Mar 2023 11:09) (133/74 - 133/74)  RR: 16 (31 Mar 2023 11:09) (16 - 16)  SpO2: 97% (31 Mar 2023 11:09) (97% - 100%)  Parameters below as of 31 Mar 2023 11:09  Patient On (Oxygen Delivery Method): room air    PHYSICAL EXAM:  GEN: NAD, AOx3  CV: S1S2, RRR.  Pulm: CTABL, no WRR. Speaking in full sentences without shortness of breath.  Abd: Soft, tender to deep palpation in lower abdomen in midline, Nondistended. No rebound tenderness or guarding. Bowel sounds present  PELVIC:        EXTERNAL GENITALIA: atraumatic, no lesions        VAGINA: small amount of dark red blood in vault, and some at cervical os, no active bleeding        CERVIX: Pink, closed        UTERUS: approximately 12 weeks in size, fundus tender, no cervical motion tenderness        ADNEXA: not palpable Vital Signs Last 24 Hrs  T(C): 36.4 (31 Mar 2023 11:09), Max: 36.6 (31 Mar 2023 07:59)  T(F): 97.5 (31 Mar 2023 11:09), Max: 97.9 (31 Mar 2023 07:59)  HR: 64 (31 Mar 2023 11:09) (64 - 65)  BP: 133/74 (31 Mar 2023 11:09) (133/74 - 133/74)  RR: 16 (31 Mar 2023 11:09) (16 - 16)  SpO2: 97% (31 Mar 2023 11:09) (97% - 100%)  Parameters below as of 31 Mar 2023 11:09  Patient On (Oxygen Delivery Method): room air    PHYSICAL EXAM:  GEN: NAD, AOx3  CV: S1S2, RRR.  Pulm: CTABL, no WRR. Speaking in full sentences without shortness of breath.  Abd: Soft, tender to deep palpation in lower abdomen in midline, Nondistended. No rebound tenderness or guarding. Bowel sounds present  PELVIC:        EXTERNAL GENITALIA: atraumatic, no lesions        VAGINA: small amount of dark red blood in vault, and some at cervical os, no active bleeding        CERVIX: Pink, closed        UTERUS: retroverted, approximately 8 weeks in size, fundus tender, no cervical motion tenderness        ADNEXA: not palpable

## 2023-03-31 NOTE — ASU DISCHARGE PLAN (ADULT/PEDIATRIC) - ASU DC SPECIAL INSTRUCTIONSFT
-Take metronidazole 500mg twice a day.  -Take ibuprofen 600mg every 6 hours and acetaminophen 1000mg every 6 hours.

## 2023-03-31 NOTE — ED STATDOCS - PROGRESS NOTE DETAILS
Here to be admitted under Dr Luna for D and C . On call to OR later this afternoon. As per OBGYN, please do pre op labs:cbc, bmp, pt/ptt/inr, type and screen, type andd cross x 2 units, and give 200 mg cytotec po  to main for admission
No

## 2023-03-31 NOTE — ED ADULT NURSE NOTE - OBJECTIVE STATEMENT
Assumed care at 0845 pt co vaginal bleeding with clots, pt had csection 9 weeks ago, went to her OB and was told to come to the ED for a D& C. pt denies any fevers, chills.

## 2023-03-31 NOTE — ED PROVIDER NOTE - NS ED ROS FT
Review of Systems  CONSTITUTIONAL: afebrile w/no diaphoresis or weight changes  SKIN: warm, dry w/ no rash or bleeding  EYES: no changes to vision  ENT: no changes in hearing, no sore throat  RESPIRATORY: no cough or SOB  CARDIAC: no chest pain & no palpitations  GI: no abd pain, vomiting, diarrhea, constipation, or blood in stool/porfirio blood +NAUSEA  GENITO-URINARY: no discharge, dysuria or hematuria,   MUSCULOSKELETAL:  no joint pain, swelling or redness  NEUROLOGIC: no weakness, headache, anesthesia or paresthesias  PSYCH: no anxiety, non suicidal, non homicidal, without hallucinations or depression

## 2023-03-31 NOTE — ASU DISCHARGE PLAN (ADULT/PEDIATRIC) - PAIN MANAGEMENT
Take ibuprofen 600mg q6h and acetaminophen 1000mg q6h/Prescriptions electronically submitted to pharmacy from [facility]

## 2023-03-31 NOTE — ED ADULT NURSE REASSESSMENT NOTE - NS ED NURSE REASSESS COMMENT FT1
Pt AOX3, denies any pain resting comfortably with VSS, no complaints at this time. Will continue to monitor.

## 2023-04-01 VITALS
HEART RATE: 70 BPM | RESPIRATION RATE: 17 BRPM | OXYGEN SATURATION: 99 % | SYSTOLIC BLOOD PRESSURE: 120 MMHG | DIASTOLIC BLOOD PRESSURE: 76 MMHG

## 2023-04-05 LAB — SURGICAL PATHOLOGY STUDY: SIGNIFICANT CHANGE UP

## 2023-04-06 ENCOUNTER — APPOINTMENT (OUTPATIENT)
Dept: OBGYN | Facility: CLINIC | Age: 28
End: 2023-04-06
Payer: COMMERCIAL

## 2023-04-06 VITALS
WEIGHT: 172 LBS | HEART RATE: 121 BPM | HEIGHT: 63 IN | BODY MASS INDEX: 30.48 KG/M2 | DIASTOLIC BLOOD PRESSURE: 92 MMHG | SYSTOLIC BLOOD PRESSURE: 130 MMHG

## 2023-04-06 DIAGNOSIS — N71.9 INFLAMMATORY DISEASE OF UTERUS, UNSPECIFIED: ICD-10-CM

## 2023-04-06 PROCEDURE — 99024 POSTOP FOLLOW-UP VISIT: CPT

## 2023-04-06 PROCEDURE — 36415 COLL VENOUS BLD VENIPUNCTURE: CPT

## 2023-04-06 NOTE — HISTORY OF PRESENT ILLNESS
[Pain is well-controlled] : pain is well-controlled [Fever] : no fever [Chills] : no chills [Nausea] : no nausea [Vomiting] : no vomiting [Diarrhea] : diarrhea [Vaginal Bleeding] : vaginal bleeding [Pelvic Pressure] : no pelvic pressure [Dysuria] : no dysuria [Vaginal Discharge] : no vaginal discharge [Constipation] : no constipation [Mild] : mild vaginal bleeding [de-identified] : 7 [de-identified] : suction and endometrial curettage [de-identified] : Retained fragment of placenta [de-identified] : benign sof abdomen  Uterine moderate tenderness

## 2023-04-06 NOTE — PLAN
[FreeTextEntry1] : Patient is a 27-year-old status post  section on 2023 who presented with vaginal bleeding and sonogram valuation of possible retained placenta patient underwent a suction and endometrial curettage on  at the sono guidance with final pathology showing necrotic focally calcified placental tissue and fragments of inactive endometrium\par Results discussed with patient\par Patient is experiencing some lower pelvic pain but no discomfort in the upper abdomen or acute abdomen\par Patient denies any fever or chills\par Patient had been treated on Flagyl twice a day for the past 7 days\par Patient does state she has occasional diarrhea movements\par Essentially benign postop exam considering the endometrial curettage and discomfort of the uterus at this point\par Patient will be followed up in 4 days for further evaluation and possible need for any pelvic sonogram or CAT scan at that point\par CBC was drawn during this visit

## 2023-04-10 ENCOUNTER — APPOINTMENT (OUTPATIENT)
Dept: OBGYN | Facility: CLINIC | Age: 28
End: 2023-04-10
Payer: COMMERCIAL

## 2023-04-10 ENCOUNTER — ASOB RESULT (OUTPATIENT)
Age: 28
End: 2023-04-10

## 2023-04-10 VITALS
BODY MASS INDEX: 30.65 KG/M2 | SYSTOLIC BLOOD PRESSURE: 169 MMHG | HEART RATE: 96 BPM | WEIGHT: 173 LBS | HEIGHT: 63 IN | DIASTOLIC BLOOD PRESSURE: 143 MMHG

## 2023-04-10 LAB
BASOPHILS # BLD AUTO: 0.05 K/UL
BASOPHILS NFR BLD AUTO: 0.6 %
EOSINOPHIL # BLD AUTO: 0.1 K/UL
EOSINOPHIL NFR BLD AUTO: 1.2 %
HCT VFR BLD CALC: 44.3 %
HGB BLD-MCNC: 14.1 G/DL
IMM GRANULOCYTES NFR BLD AUTO: 0.2 %
LYMPHOCYTES # BLD AUTO: 3.18 K/UL
LYMPHOCYTES NFR BLD AUTO: 37.1 %
MAN DIFF?: NORMAL
MCHC RBC-ENTMCNC: 29.1 PG
MCHC RBC-ENTMCNC: 31.8 GM/DL
MCV RBC AUTO: 91.3 FL
MONOCYTES # BLD AUTO: 0.46 K/UL
MONOCYTES NFR BLD AUTO: 5.4 %
NEUTROPHILS # BLD AUTO: 4.77 K/UL
NEUTROPHILS NFR BLD AUTO: 55.5 %
PLATELET # BLD AUTO: 329 K/UL
RBC # BLD: 4.85 M/UL
RBC # FLD: 12.6 %
WBC # FLD AUTO: 8.58 K/UL

## 2023-04-10 PROCEDURE — 76830 TRANSVAGINAL US NON-OB: CPT

## 2023-04-10 PROCEDURE — 99213 OFFICE O/P EST LOW 20 MIN: CPT

## 2023-04-10 RX ORDER — METRONIDAZOLE 500 MG/1
500 TABLET ORAL TWICE DAILY
Qty: 20 | Refills: 2 | Status: ACTIVE | COMMUNITY
Start: 2023-04-10 | End: 1900-01-01

## 2023-04-10 RX ORDER — DOXYCYCLINE HYCLATE 100 MG/1
100 TABLET ORAL TWICE DAILY
Qty: 20 | Refills: 0 | Status: ACTIVE | COMMUNITY
Start: 2023-04-10 | End: 1900-01-01

## 2023-04-10 NOTE — HISTORY OF PRESENT ILLNESS
[Postpartum Follow Up] : postpartum follow up [FreeTextEntry1] : Patient is a 27-year-old  1 para 1 status post  section delivery on 2023\par Patient presents for follow-up\par Patient had been diagnosed of retained proximal conception portion of placenta underwent a suction endometrial curettage pathology report showed calcified portion of placenta\par Patient had been treated postoperatively with Flagyl 500 twice a day for 7 days\par Patient underwent a CBC during her last visit, which showed a white count of 8.58,, H&H of 14.1 and 44.3, and platelets of 329\par Patient was placed in the exam room and pelvic exam shows normal female external genitalia, vagina no lesions, cervix appropriate size no cervical motion tenderness, uterus anteverted normal size nontender, adnexa no mass nontender.\par Patient underwent pelvic sonogram for further valuation\par Uterus 4.9 x 5.32 x 3.99\par Cervical length 3.03 cm\par Endometrial thickness 11.9 with 2 small areas of calcifications measuring 0.24 x 0.30 x 0.25 and the second one 0.35 x 0.28 x 0.32,, this most likely represents small nodules of calcified placental tissue\par Right ovary 3 to 3.34 x 2.75 x 1.9 with a small follicle measuring 1.99 x 1.94 x 1.51\par Left ovary 2.63 x 2.25 x 1.49\par Color Doppler flow appears to be normal on the right ovary\par No adnexal masses seen\par No free fluid seen\par Findings consistent with normal anatomy\par Patient still persists of experiencing pelvic pain patient will be initiated on a second course of antibiotic therapy empiric with double antibiotics Flagyl and doxycycline\par Patient states she is not breast-feeding

## 2023-04-14 ENCOUNTER — APPOINTMENT (OUTPATIENT)
Dept: OBGYN | Facility: CLINIC | Age: 28
End: 2023-04-14

## 2023-04-24 ENCOUNTER — APPOINTMENT (OUTPATIENT)
Dept: OBGYN | Facility: CLINIC | Age: 28
End: 2023-04-24
Payer: COMMERCIAL

## 2023-04-24 VITALS
WEIGHT: 174 LBS | SYSTOLIC BLOOD PRESSURE: 131 MMHG | HEART RATE: 91 BPM | BODY MASS INDEX: 30.83 KG/M2 | DIASTOLIC BLOOD PRESSURE: 80 MMHG | HEIGHT: 63 IN

## 2023-04-24 DIAGNOSIS — N92.6 IRREGULAR MENSTRUATION, UNSPECIFIED: ICD-10-CM

## 2023-04-24 DIAGNOSIS — Z87.42 PERSONAL HISTORY OF OTHER DISEASES OF THE FEMALE GENITAL TRACT: ICD-10-CM

## 2023-04-24 PROCEDURE — 99213 OFFICE O/P EST LOW 20 MIN: CPT | Mod: 24

## 2023-04-24 RX ORDER — NORETHINDRONE ACETATE AND ETHINYL ESTRADIOL, ETHINYL ESTRADIOL AND FERROUS FUMARATE 1MG-10(24)
1 MG-10 MCG / KIT ORAL
Qty: 84 | Refills: 3 | Status: ACTIVE | COMMUNITY
Start: 2023-04-24 | End: 1900-01-01

## 2023-04-24 NOTE — HISTORY OF PRESENT ILLNESS
[Postpartum Follow Up] : postpartum follow up [Delivery Date: ___] : on [unfilled] [Primary C/S] : delivered by  section [Abdominal Pain] : abdominal pain [None] : No associated symptoms are reported [Clean/Dry/Intact] : clean, dry and intact [Healed] : healed [Mild] : mild vaginal bleeding [Not Done] : Examination of breasts not done [Chills] : no chills [Fatigue] : no fatigue [Dysuria] : no dysuria [Fever] : no fever [Headache] : no headache [Nausea] : no nausea [Vomiting] : no vomiting [Erythema] : not erythematous [Swelling] : not swollen [Dehiscence] : not dehisced [de-identified] : Right lower quadrant cramping after urinating on occasion and occasional still passing some blood and clots [FreeTextEntry1] : Patient status post  section \par Patient underwent a dilatation at [facility] and surgical ties on the sono guidance on 2023 for a small piece of retained calcified placenta\par Patient has been treated with antibiotics 2 courses of antibiotics first with just Flagyl Cipro Doxy and Flagyl\par Patient this point states she was still having some passing of blood and clots on occasion and also on occasion has some right lower quadrant pain after voiding\par Patient denies any urinary symptoms\par Incision clean dry and intact,, well-healed\par Patient denies any dysuria, increased frequency, fevers.\par Patient does have soft stools has been advised to pursue a GI evaluation\par Patient will be started on a low-dose birth control pill for 3 months to try to control the endometrial lining\par Follow-up 5-6 weeks

## 2023-04-25 LAB
APPEARANCE: CLEAR
BILIRUBIN URINE: NEGATIVE
BLOOD URINE: NEGATIVE
COLOR: YELLOW
GLUCOSE QUALITATIVE U: NEGATIVE MG/DL
KETONES URINE: NEGATIVE MG/DL
LEUKOCYTE ESTERASE URINE: NEGATIVE
NITRITE URINE: NEGATIVE
PH URINE: 6.5
PROTEIN URINE: NEGATIVE MG/DL
SPECIFIC GRAVITY URINE: 1.01
UROBILINOGEN URINE: 0.2 MG/DL

## 2023-04-26 LAB — BACTERIA UR CULT: NORMAL

## 2023-06-05 ENCOUNTER — NON-APPOINTMENT (OUTPATIENT)
Age: 28
End: 2023-06-05

## 2023-06-05 ENCOUNTER — APPOINTMENT (OUTPATIENT)
Dept: OBGYN | Facility: CLINIC | Age: 28
End: 2023-06-05
Payer: COMMERCIAL

## 2023-06-05 VITALS
HEIGHT: 63 IN | WEIGHT: 178 LBS | SYSTOLIC BLOOD PRESSURE: 127 MMHG | DIASTOLIC BLOOD PRESSURE: 83 MMHG | BODY MASS INDEX: 31.54 KG/M2 | HEART RATE: 84 BPM

## 2023-06-05 PROCEDURE — 0503F POSTPARTUM CARE VISIT: CPT

## 2023-06-05 NOTE — HISTORY OF PRESENT ILLNESS
[Postpartum Follow Up] : postpartum follow up [Delivery Date: ___] : on [unfilled] [Primary C/S] : delivered by  section [Breastfeeding] : currently nursing [None] : No associated symptoms are reported [Clean/Dry/Intact] : clean, dry and intact [Erythema] : not erythematous [Swelling] : not swollen [Dehiscence] : not dehisced [Healed] : healed [Not Done] : Examination of breasts not done [Doing Well] : is doing well [No Sign of Infection] : is showing no signs of infection [Excellent Pain Control] : has excellent pain control [FreeTextEntry1] : Patient a 27-year-old  1 para 1 status post  section on 2023 for a viable female infant weighing 7 pounds 10 ounces\par Patient was diagnosed with retained products of conception small piece of placenta underwent endometrial suction and sharp curettage with recovery of chronic calcified placental tissue\par Patient was followed with last visit being on  which point patient had been prescribed birth control pills\par Patient states she stopped bleeding the following day after her visit and did not initiate birth control pill treatment\par Incision clean, dry, intact, healed well\par Patient states she has had a cycle since then with a normal 4-day flow without difficulty\par At this point patient does not desire contraception\par Follow-up to return to her annual status for Pap smears and exams or to be seen prior to that as needed

## 2023-07-05 NOTE — CDI QUERY NOTE - NSCDIOTHERTXTBX_GEN_ALL_CORE_HH
Per coding Guidelines:  The assignment of a diagnosis code is based on the provider’s diagnostic statement that the condition exists. The provider’s statement that the patient has a particular condition is sufficient. Code assignment is not based on clinical criteria used by the provider to establish the diagnosis. If there is conflicting medical record documentation or clinical indicators a query to the provider may be needed for clarification or validity.    Patient presents with gHTN at 37w1d for IOL.  On 23 the provider notes in the progress notes and Provider Delivery Summary that the patient noted to have "chorio remote from delivery, elevated WBC, and fetal tachycardia" and underwent a  for these reasons.  In addition, "Chorioamnionitis" was noted in the discharge summary and the patient received "ampicillin, gentamycin, and Clindamycin" during the hospitalization.    Please clarify after study, evaluation, review of pathology findings and treatment if Chorioamnionitis....    -Chorioamnionitis Ruled Out  -Chorioamnionitis Ruled In and Remained Differential Diagnosis at Discharge  -Other Please Specify  -Not Clinically Significant      Chart Documentation Location:    23 Discharge Summary:  Hospital Course	  Primary  at 37w1d secondary to category II tracing remote from delivery complicated by gHTN and chorioamnionitis s/p ampicillin, gentamycin, and clindamycin.    23 Progress note:  27y  now POD#2 s/p primary  section at 37w1d gestation due to persistent category two tracing remote from delivery. Labor complicated by chorioamnionitis. S/p ampicillin, gentamycin, and Clindamycin.    Labs:   WBC 10.36 and 18.56   WBC 21.71   WBC 15.89    Medication:  Ampicillin IVPB  Gentamycin IVPB  Clindamycin IVPB    23 Pathology Finding:  Specimen(s) Submitted  1  Placenta    Final Diagnosis  1. Yates placenta, 587gm:  -  Umbilical cord with 3 vessels  - Membranes are unremarkable  - Placental disc with infarct and pervillous fibrin

## 2023-07-06 NOTE — CHART NOTE - NSCHARTNOTEFT_GEN_A_CORE
Patient diagnosed with a suspected chorio intrapartum based on clinical criteria. Final pathology of placenta ruled out chorio. Patient diagnosed with a suspected chorio intrapartum based on clinical criteria. Final pathology of placenta ruled out chorio.    On clinical findings ===Patient had evidence of chorioamnionitis with fever and fetal tachycardia .  Based on this findings patient was treated appropriately.  Pathology findings are only one aspect of diagnosis      DARRON Santillan MD  #[id number]

## 2023-11-10 ENCOUNTER — NON-APPOINTMENT (OUTPATIENT)
Age: 28
End: 2023-11-10

## 2024-03-12 NOTE — OB RN PATIENT PROFILE - VISION (WITH CORRECTIVE LENSES IF THE PATIENT USUALLY WEARS THEM):
[facility] - CARDIOLOGY PROGRESS NOTE  Cristiano Obregon Patient Status:  Inpatient    1968 MRN [id number]   Location [facility] 2W/SW Attending Rick Mendenhall MD   Hosp Day # 7 PCP Isauro Pina MD     Assessment:    NSTEMI, multivessel CAD s/p balloon angioplasty proximal circumflex 3/5/2024  -Severe three-vessel CAD.  - S/p placement of Impella, weaned support 3/11- with unchanged PA / CO indices  - S/p RHC on 3/6/2024 with normal hemodynamics, low to normal filling pressure  - TTE with EF now up to 50 to 55%, no valvular issues  - Aspirin 81 mg per rectum, IV cangrelor, IV heparin  - Still essentially an uric, receiving CRRT.      Plan:    After further discussion will continue impella support until induction and placing on bypass and then reassess in the OR regarding impella support post op vs IABP vs inotrops.    Carlitos Sandoval MD  Interventional Cardiology  [facility]        Subjective:  Intubated and sedated    Objective:  BP 96/67   Pulse 70   Temp 99.6 °F (37.6 °C)   Resp 15   Ht 67\"   Wt 186 lb 4.6 oz (84.5 kg)   SpO2 100%   BMI 29.18 kg/m²     Temp (24hrs), Av °F (37.8 °C), Min:98.6 °F (37 °C), Max:100.7 °F (38.2 °C)      Intake/Output:    Intake/Output Summary (Last 24 hours) at 3/12/2024 1235  Last data filed at 3/12/2024 1200  Gross per 24 hour   Intake 3191.05 ml   Output 3375 ml   Net -183.95 ml       Wt Readings from Last 2 Encounters:   24 186 lb 4.6 oz (84.5 kg)   10/06/21 177 lb 6.4 oz (80.5 kg)       Physical Exam:    General: Alert and oriented x 3,  No apparent distress.  HEENT: No focal deficits.  Neck: No JVD, carotids 2+ no bruits.  Cardiac: Regular rate and rhythm, S1, S2 normal, no murmur  Lungs: Clear without wheezes, rales, rhonchi.  Normal excursions and effort.  Abdomen: Soft, non-tender.   Extremities: Without clubbing,  cyanosis or edema.  Peripheral pulses are 2+.  Skin: Warm and dry.     Labs:  Lab Results   Component Value Date    WBC 8.3 03/12/2024    HGB 8.3 03/12/2024    HCT 25.2 03/12/2024    PLT 71.0 03/12/2024     Lab Results   Component Value Date    INR 1.06 03/07/2024     Lab Results   Component Value Date     03/12/2024     03/12/2024    K 5.0 03/12/2024    K 5.0 03/12/2024     03/12/2024     03/12/2024    CO2 26.0 03/12/2024    CO2 26.0 03/12/2024    BUN 28 03/12/2024    BUN 28 03/12/2024    CREATSERUM 3.06 03/12/2024    CREATSERUM 3.06 03/12/2024     03/12/2024     03/12/2024    MG 1.9 03/12/2024    CA 8.9 03/12/2024    CA 8.9 03/12/2024    ALT 78 03/12/2024     03/12/2024    ALB 3.1 03/12/2024    ALB 3.1 03/12/2024        No results found for: \"TROP\", \"CKMB\"     Medications:     mineral oil-white petrolatum   Both Eyes TID    cefepime  1 g Intravenous Q12H    aspirin  300 mg Rectal Daily    pantoprazole  40 mg Intravenous Daily    chlorhexidine gluconate  15 mL Mouth/Throat BID@0800,2000      NxStage Pure Flow 400 CRRT/PIRRT fluid 5000mL 2.5 L/hr (03/12/24 1056)    adult 3 in 1 TPN      adult 3 in 1 TPN 50 mL/hr at 03/11/24 2140    dextrose 10%      norepinephrine Stopped (03/11/24 0625)    cangrelor (Kengreal) 50 mg in sodium chloride 0.9% 250 mL IVPB - BRIDGING/MAINTENANCE dose 0.75 mcg/kg/min (03/12/24 1128)    sodium bicarbonate 25mEq in dextrose 5% 1000mL IMPELLA purge solution 13 mL/hr (03/12/24 1209)    heparin Stopped (03/10/24 1224)    propofol 50 mcg/kg/min (03/12/24 6055)    fentanyl 75 mcg/hr (03/12/24 4400)            Normal vision: sees adequately in most situations; can see medication labels, newsprint

## 2024-03-27 NOTE — ASU DISCHARGE PLAN (ADULT/PEDIATRIC) - NS MD DC FALL RISK RISK
For information on Fall & Injury Prevention, visit: [email or web address] OR  [email or web address] OR  https://www.cdc.gov/steadi/patient.html
stated

## 2024-06-07 ENCOUNTER — APPOINTMENT (OUTPATIENT)
Dept: OBGYN | Facility: CLINIC | Age: 29
End: 2024-06-07
Payer: COMMERCIAL

## 2024-06-07 VITALS
HEART RATE: 108 BPM | HEIGHT: 63 IN | WEIGHT: 181 LBS | BODY MASS INDEX: 32.07 KG/M2 | DIASTOLIC BLOOD PRESSURE: 83 MMHG | SYSTOLIC BLOOD PRESSURE: 137 MMHG

## 2024-06-07 DIAGNOSIS — Z23 ENCOUNTER FOR IMMUNIZATION: ICD-10-CM

## 2024-06-07 DIAGNOSIS — N91.1 SECONDARY AMENORRHEA: ICD-10-CM

## 2024-06-07 PROCEDURE — 99204 OFFICE O/P NEW MOD 45 MIN: CPT

## 2024-06-07 PROCEDURE — 99459 PELVIC EXAMINATION: CPT

## 2024-06-07 RX ORDER — ONDANSETRON 4 MG/1
4 TABLET, ORALLY DISINTEGRATING ORAL
Qty: 30 | Refills: 1 | Status: ACTIVE | COMMUNITY
Start: 2024-06-07 | End: 1900-01-01

## 2024-06-07 NOTE — PHYSICAL EXAM
[Chaperone Present] : A chaperone was present in the examining room during all aspects of the physical examination [[id number]] : A chaperone was present during the pelvic exam. [FreeTextEntry2] : Karen [Appropriately responsive] : appropriately responsive [Alert] : alert [No Acute Distress] : no acute distress [No Lymphadenopathy] : no lymphadenopathy [Regular Rate Rhythm] : regular rate rhythm [No Murmurs] : no murmurs [Clear to Auscultation B/L] : clear to auscultation bilaterally [Soft] : soft [Non-tender] : non-tender [Non-distended] : non-distended [No HSM] : No HSM [No Lesions] : no lesions [No Mass] : no mass [Oriented x3] : oriented x3 [FreeTextEntry6] : No masses, nontender, no skin changes, no nipple discharge, no adenopathy. [Examination Of The Breasts] : a normal appearance [No Masses] : no breast masses were palpable [Labia Majora] : normal [Labia Minora] : normal [Normal] : normal [Tenderness] : nontender [Anteversion] : anteverted [Mass ___ cm] : no uterine mass was palpated [Uterine Adnexae] : normal

## 2024-06-07 NOTE — PLAN
[FreeTextEntry1] : Patient is a 28-year-old  2 para 1 last menstrual period 2024 Patient presents for evaluation after obtaining home positive pregnancy test Physical exam reveals a well-developed well-nourished female no apparent distress,, BMI 32 Heart regular rhythm and rate, lungs clear, breast no mass nontender no skin change or nipple discharge no adenopathy, abdomen soft nontender no organomegaly Pelvic exam shows normal female external genitalia, vagina no lesions, cervix appropriate size nontender, uterus anteverted normal size nontender, adnexa no mass nontender Pap smear was performed Urine pregnancy test is positive Patient states she has been vitamins and for folic acid supplementation and will be prescribed Zofran Follow-up 3 to 4 weeks for OB dating sonogram  Karen was present as a chaperone for the entire assessment and examination of this patient

## 2024-06-07 NOTE — HISTORY OF PRESENT ILLNESS
[FreeTextEntry1] : Patient 28-year-old  2 para 1 last menstrual period  Patient presents for relation after obtaining a home positive pregnancy test

## 2024-06-13 LAB — CYTOLOGY CVX/VAG DOC THIN PREP: NORMAL

## 2024-06-24 LAB
C TRACH RRNA SPEC QL NAA+PROBE: NOT DETECTED
HPV HIGH+LOW RISK DNA PNL CVX: NOT DETECTED
N GONORRHOEA RRNA SPEC QL NAA+PROBE: NOT DETECTED
SOURCE TP AMPLIFICATION: NORMAL

## 2024-07-02 ENCOUNTER — APPOINTMENT (OUTPATIENT)
Dept: OBGYN | Facility: CLINIC | Age: 29
End: 2024-07-02
Payer: COMMERCIAL

## 2024-07-02 ENCOUNTER — ASOB RESULT (OUTPATIENT)
Age: 29
End: 2024-07-02

## 2024-07-02 VITALS
BODY MASS INDEX: 31.89 KG/M2 | DIASTOLIC BLOOD PRESSURE: 83 MMHG | SYSTOLIC BLOOD PRESSURE: 131 MMHG | HEIGHT: 63 IN | HEART RATE: 103 BPM | WEIGHT: 180 LBS

## 2024-07-02 DIAGNOSIS — Z01.419 ENCOUNTER FOR GYNECOLOGICAL EXAMINATION (GENERAL) (ROUTINE) W/OUT ABNORMAL FINDINGS: ICD-10-CM

## 2024-07-02 DIAGNOSIS — M22.42 CHONDROMALACIA PATELLAE, LEFT KNEE: ICD-10-CM

## 2024-07-02 DIAGNOSIS — G89.29 RIGHT UPPER QUADRANT PAIN: ICD-10-CM

## 2024-07-02 DIAGNOSIS — R79.89 OTHER SPECIFIED ABNORMAL FINDINGS OF BLOOD CHEMISTRY: ICD-10-CM

## 2024-07-02 DIAGNOSIS — Z87.898 PERSONAL HISTORY OF OTHER SPECIFIED CONDITIONS: ICD-10-CM

## 2024-07-02 DIAGNOSIS — S80.02XA CONTUSION OF LEFT KNEE, INITIAL ENCOUNTER: ICD-10-CM

## 2024-07-02 DIAGNOSIS — R10.2 PELVIC AND PERINEAL PAIN: ICD-10-CM

## 2024-07-02 DIAGNOSIS — R10.11 RIGHT UPPER QUADRANT PAIN: ICD-10-CM

## 2024-07-02 DIAGNOSIS — Z12.4 ENCOUNTER FOR SCREENING FOR MALIGNANT NEOPLASM OF CERVIX: ICD-10-CM

## 2024-07-02 DIAGNOSIS — Z11.51 ENCOUNTER FOR SCREENING FOR HUMAN PAPILLOMAVIRUS (HPV): ICD-10-CM

## 2024-07-02 DIAGNOSIS — Z12.39 ENCOUNTER FOR OTHER SCREENING FOR MALIGNANT NEOPLASM OF BREAST: ICD-10-CM

## 2024-07-02 PROCEDURE — 76801 OB US < 14 WKS SINGLE FETUS: CPT

## 2024-07-02 PROCEDURE — 0500F INITIAL PRENATAL CARE VISIT: CPT

## 2024-07-02 PROCEDURE — 76817 TRANSVAGINAL US OBSTETRIC: CPT | Mod: 59

## 2024-07-02 PROCEDURE — 81002 URINALYSIS NONAUTO W/O SCOPE: CPT | Mod: NC

## 2024-07-08 ENCOUNTER — APPOINTMENT (OUTPATIENT)
Dept: MATERNAL FETAL MEDICINE | Facility: CLINIC | Age: 29
End: 2024-07-08
Payer: COMMERCIAL

## 2024-07-08 ENCOUNTER — ASOB RESULT (OUTPATIENT)
Age: 29
End: 2024-07-08

## 2024-07-08 PROCEDURE — 99212 OFFICE O/P EST SF 10 MIN: CPT | Mod: 95

## 2024-07-11 DIAGNOSIS — Z86.79 PERSONAL HISTORY OF OTHER DISEASES OF THE CIRCULATORY SYSTEM: ICD-10-CM

## 2024-07-11 DIAGNOSIS — Z3A.09 9 WEEKS GESTATION OF PREGNANCY: ICD-10-CM

## 2024-07-11 DIAGNOSIS — O09.219 SUPERVISION OF PREGNANCY WITH HISTORY OF PRE-TERM LABOR, UNSPECIFIED TRIMESTER: ICD-10-CM

## 2024-07-11 DIAGNOSIS — O09.299 SUPERVISION OF PREGNANCY WITH OTHER POOR REPRODUCTIVE OR OBSTETRIC HISTORY, UNSPECIFIED TRIMESTER: ICD-10-CM

## 2024-07-11 DIAGNOSIS — Z82.79 FAMILY HISTORY OF OTHER CONGENITAL MALFORMATIONS, DEFORMATIONS AND CHROMOSOMAL ABNORMALITIES: ICD-10-CM

## 2024-07-11 DIAGNOSIS — O09.899 SUPERVISION OF OTHER HIGH RISK PREGNANCIES, UNSPECIFIED TRIMESTER: ICD-10-CM

## 2024-07-11 DIAGNOSIS — R11.0 OTHER SPECIFIED PREGNANCY RELATED CONDITIONS, UNSPECIFIED TRIMESTER: ICD-10-CM

## 2024-07-11 DIAGNOSIS — Z34.90 ENCOUNTER FOR SUPERVISION OF NORMAL PREGNANCY, UNSPECIFIED, UNSPECIFIED TRIMESTER: ICD-10-CM

## 2024-07-11 DIAGNOSIS — Z87.42 PERSONAL HISTORY OF OTHER DISEASES OF THE FEMALE GENITAL TRACT: ICD-10-CM

## 2024-07-11 DIAGNOSIS — Z83.3 FAMILY HISTORY OF DIABETES MELLITUS: ICD-10-CM

## 2024-07-11 DIAGNOSIS — O26.899 OTHER SPECIFIED PREGNANCY RELATED CONDITIONS, UNSPECIFIED TRIMESTER: ICD-10-CM

## 2024-07-11 DIAGNOSIS — Z82.49 FAMILY HISTORY OF ISCHEMIC HEART DISEASE AND OTHER DISEASES OF THE CIRCULATORY SYSTEM: ICD-10-CM

## 2024-07-11 DIAGNOSIS — Z98.891 HISTORY OF UTERINE SCAR FROM PREVIOUS SURGERY: ICD-10-CM

## 2024-07-11 RX ORDER — PRENATAL VIT 49/IRON FUM/FOLIC 6.75-0.2MG
TABLET ORAL
Refills: 0 | Status: ACTIVE | COMMUNITY

## 2024-07-11 RX ORDER — ASPIRIN 81 MG/1
81 TABLET ORAL
Refills: 0 | Status: ACTIVE | COMMUNITY

## 2024-07-13 ENCOUNTER — LABORATORY RESULT (OUTPATIENT)
Age: 29
End: 2024-07-13

## 2024-07-15 LAB
ABO + RH PNL BLD: NORMAL
BASOPHILS # BLD AUTO: 0.05 K/UL
BASOPHILS NFR BLD AUTO: 0.6 %
BLD GP AB SCN SERPL QL: NORMAL
CMV IGG SERPL QL: <0.2 U/ML
CMV IGG SERPL-IMP: NEGATIVE
CMV IGM SERPL QL: <8 AU/ML
CMV IGM SERPL QL: NEGATIVE
EOSINOPHIL # BLD AUTO: 0.07 K/UL
EOSINOPHIL NFR BLD AUTO: 0.8 %
ESTIMATED AVERAGE GLUCOSE: 111 MG/DL
HBA1C MFR BLD HPLC: 5.5 %
HBV SURFACE AG SER QL: NONREACTIVE
HCT VFR BLD CALC: 42.1 %
HCV AB SER QL: NONREACTIVE
HCV S/CO RATIO: 0.1 S/CO
HGB A MFR BLD: 97.6 %
HGB A2 MFR BLD: 2.4 %
HGB BLD-MCNC: 12.9 G/DL
HGB FRACT BLD-IMP: NORMAL
HIV1+2 AB SPEC QL IA.RAPID: NONREACTIVE
IMM GRANULOCYTES NFR BLD AUTO: 0.2 %
LYMPHOCYTES # BLD AUTO: 2.44 K/UL
LYMPHOCYTES NFR BLD AUTO: 27 %
MAN DIFF?: NORMAL
MCHC RBC-ENTMCNC: 28.9 PG
MCHC RBC-ENTMCNC: 30.6 GM/DL
MCV RBC AUTO: 94.2 FL
MEV IGG FLD QL IA: >300 AU/ML
MEV IGG+IGM SER-IMP: POSITIVE
MONOCYTES # BLD AUTO: 0.42 K/UL
MONOCYTES NFR BLD AUTO: 4.7 %
NEUTROPHILS # BLD AUTO: 6.03 K/UL
NEUTROPHILS NFR BLD AUTO: 66.7 %
PLATELET # BLD AUTO: 256 K/UL
RBC # BLD: 4.47 M/UL
RBC # FLD: 13.1 %
RUBV IGG FLD-ACNC: 5.76 INDEX
RUBV IGG SER-IMP: POSITIVE
T GONDII AB SER-IMP: NEGATIVE
T GONDII IGM SER QL: <3 AU/ML
T PALLIDUM AB SER QL IA: NEGATIVE
TSH SERPL-ACNC: 2.09 UIU/ML
VZV AB TITR SER: POSITIVE
VZV IGG SER IF-ACNC: 738.7 INDEX
VZV IGM SER IF-ACNC: <0.91 INDEX
WBC # FLD AUTO: 9.03 K/UL

## 2024-07-16 ENCOUNTER — NON-APPOINTMENT (OUTPATIENT)
Age: 29
End: 2024-07-16

## 2024-07-18 LAB
AR GENE MUT ANL BLD/T: NORMAL
FMR1 GENE MUT ANL BLD/T: NORMAL

## 2024-07-19 ENCOUNTER — APPOINTMENT (OUTPATIENT)
Dept: ANTEPARTUM | Facility: CLINIC | Age: 29
End: 2024-07-19
Payer: COMMERCIAL

## 2024-07-19 LAB
B19V IGG SER QL IA: 7.83 INDEX
B19V IGG+IGM SER-IMP: NORMAL
B19V IGG+IGM SER-IMP: POSITIVE
B19V IGM FLD-ACNC: 0.22 INDEX
B19V IGM SER-ACNC: NEGATIVE

## 2024-07-19 PROCEDURE — 36415 COLL VENOUS BLD VENIPUNCTURE: CPT

## 2024-07-22 ENCOUNTER — NON-APPOINTMENT (OUTPATIENT)
Age: 29
End: 2024-07-22

## 2024-07-22 LAB — CFTR MUT TESTED BLD/T: NEGATIVE

## 2024-07-26 ENCOUNTER — APPOINTMENT (OUTPATIENT)
Dept: ANTEPARTUM | Facility: CLINIC | Age: 29
End: 2024-07-26
Payer: COMMERCIAL

## 2024-07-26 ENCOUNTER — NON-APPOINTMENT (OUTPATIENT)
Age: 29
End: 2024-07-26

## 2024-07-26 ENCOUNTER — APPOINTMENT (OUTPATIENT)
Dept: MATERNAL FETAL MEDICINE | Facility: CLINIC | Age: 29
End: 2024-07-26
Payer: COMMERCIAL

## 2024-07-26 ENCOUNTER — ASOB RESULT (OUTPATIENT)
Age: 29
End: 2024-07-26

## 2024-07-26 VITALS
SYSTOLIC BLOOD PRESSURE: 120 MMHG | HEART RATE: 87 BPM | WEIGHT: 177 LBS | HEIGHT: 63 IN | OXYGEN SATURATION: 97 % | DIASTOLIC BLOOD PRESSURE: 78 MMHG | RESPIRATION RATE: 18 BRPM | BODY MASS INDEX: 31.36 KG/M2

## 2024-07-26 DIAGNOSIS — N91.1 SECONDARY AMENORRHEA: ICD-10-CM

## 2024-07-26 DIAGNOSIS — Z3A.09 9 WEEKS GESTATION OF PREGNANCY: ICD-10-CM

## 2024-07-26 DIAGNOSIS — K82.4 CHOLESTEROLOSIS OF GALLBLADDER: ICD-10-CM

## 2024-07-26 DIAGNOSIS — M24.80 OTHER SPECIFIC JOINT DERANGEMENTS OF UNSPECIFIED JOINT, NOT ELSEWHERE CLASSIFIED: ICD-10-CM

## 2024-07-26 DIAGNOSIS — Z87.898 PERSONAL HISTORY OF OTHER SPECIFIED CONDITIONS: ICD-10-CM

## 2024-07-26 DIAGNOSIS — Z87.42 PERSONAL HISTORY OF OTHER DISEASES OF THE FEMALE GENITAL TRACT: ICD-10-CM

## 2024-07-26 DIAGNOSIS — Z87.19 PERSONAL HISTORY OF OTHER DISEASES OF THE DIGESTIVE SYSTEM: ICD-10-CM

## 2024-07-26 DIAGNOSIS — Z98.891 HISTORY OF UTERINE SCAR FROM PREVIOUS SURGERY: ICD-10-CM

## 2024-07-26 DIAGNOSIS — O09.219 SUPERVISION OF PREGNANCY WITH HISTORY OF PRE-TERM LABOR, UNSPECIFIED TRIMESTER: ICD-10-CM

## 2024-07-26 DIAGNOSIS — O09.899 SUPERVISION OF OTHER HIGH RISK PREGNANCIES, UNSPECIFIED TRIMESTER: ICD-10-CM

## 2024-07-26 DIAGNOSIS — G90.50 COMPLEX REGIONAL PAIN SYNDROME I, UNSPECIFIED: ICD-10-CM

## 2024-07-26 DIAGNOSIS — O09.299 SUPERVISION OF PREGNANCY WITH OTHER POOR REPRODUCTIVE OR OBSTETRIC HISTORY, UNSPECIFIED TRIMESTER: ICD-10-CM

## 2024-07-26 PROBLEM — Z86.79 HISTORY OF SUPRAVENTRICULAR TACHYCARDIA: Status: RESOLVED | Noted: 2022-06-24 | Resolved: 2024-07-26

## 2024-07-26 PROBLEM — Z3A.12 12 WEEKS GESTATION OF PREGNANCY: Status: ACTIVE | Noted: 2024-07-26

## 2024-07-26 PROBLEM — O99.419 SUPRAVENTRICULAR TACHYCARDIA DURING PREGNANCY: Status: ACTIVE | Noted: 2024-07-26

## 2024-07-26 PROCEDURE — 76813 OB US NUCHAL MEAS 1 GEST: CPT

## 2024-07-26 PROCEDURE — 36416 COLLJ CAPILLARY BLOOD SPEC: CPT

## 2024-07-26 PROCEDURE — 99215 OFFICE O/P EST HI 40 MIN: CPT

## 2024-07-26 NOTE — VITALS
[LMP (date): ___] : LMP was on [unfilled] [GA =___ Weeks] : which calculates to a GA of [unfilled] weeks [GA= ___ Days] : and [unfilled] day(s) [XOCHITL by LMP (date): ___] : The calculated XOCHITL by LMP is [unfilled] [By LMP] : this is the final XOCHITL

## 2024-07-26 NOTE — DISCUSSION/SUMMARY
[FreeTextEntry1] : We had the pleasure of seeing your patient for a Maternal-Fetal Medicine consultation today. She was extensively counseled regarding the following issues:  History of preeclampsia: Vladimir is at risk for recurrence of preeclampsia in this pregnancy based on her history. She does not have chronic hypertension. Her blood pressure is normal today. The recurrence risk varies with the severity and time of onset of the initial episode. Women with early-onset, severe preeclampsia are at greatest risk of recurrence whereas patients who have had preeclampsia at term or in the postpartum period are at somewhat lower risk. Blood pressure surveillance recommended in third trimester. Low dose aspirin recommended to reduce the risk of preeclampsia.  Obesity, class 1: Obesity is associated with an increased risk for pregnancy complications such as preeclampsia. Complications from surgery are increased, as well as failure of regional anesthesia. In addition, the fetus is at increased risk for congenital anomalies, most commonly neural tube defects and cardiac anomalies, even in the absence of diabetes.  Malformations are more difficult to assess by ultrasound evaluation due to the decreased sensitivity of ultrasound in women with a high BMI. During pregnancy, optimum weight gain recommended by the [place] of [employer] 2009 Guidelines is 11-20 pounds. Furthermore, pregnant women with obesity are at a greater risk for venous thromboembolism.  History of supraventricular tachycardia s/p cardiac ablation in 2018: No longer followed by cardiologist. She reports occasional missed beats and palpitations but no bothersome symptoms. CardioOB referral recommended as she never had evaluation after last pregnancy which was complicated by severe preeclampsia.  History of retained placenta: suggest post-delivery ultrasound of uterine cavity to confirm empty. Patient understands that there is a risk of recurrence.  Delivery should not occur prior to 39 weeks unless otherwise indicated  Thank you for requesting a consultation on this patient. The total time spent in preparation for this visit, medical history taking, orders, review of records, counseling the patient, and writing this note was 60 minutes.  At the end of our discussion, the patient indicated that her questions were answered and she seemed satisfied with our discussion. Please do not hesitate to contact us with any questions.  Sincerely,   Abdoul Wick MD, [facility] Attending Physician, Maternal-Fetal Medicine

## 2024-07-26 NOTE — FAMILY HISTORY
[Age 35+ During Pregnancy] : not 35 or over during pregnancy [Reported Family History Of Birth Defects] : no congenital heart defects [Jaime-Sachs Carrier] : no Jaime-Sachs [Family History] : no mental retardation/autism [Reported Family History Of Genetic Disease] : no history of child defect in child of baby father

## 2024-07-26 NOTE — OB HISTORY
[Definite:  ___ (Date)] : the last menstrual period was [unfilled] [Pregnancy History] : patient received anesthesia [___] : no pregnancy complications reported [LMP: ___] : LMP: [unfilled] [XOCHITL: ___] : XOCHITL: [unfilled] [EGA: ___ wks] : EGA: [unfilled] wks [Spontaneous] : Spontaneous conception [Sonogram] : sonogram [at ___ wks] : at [unfilled] weeks [FreeTextEntry1] : [facility] to discuss pregnancy complicated by prior OB history. Pt with H.O PEC with PTL, C/S and maternal SVT. Pt  pregnancy delivered at 37+1 for NRFHRT, maternal PEC and chorio, emergency . Pt states she had PTL symptoms at 34 weeks GA and was hospitalized for 3 days and given Beta, pt was discharged home on bedrest, Pt states at 36 weeks she developed PEC and was in hospital until delivery, Pt denies being on BP meds. Pt states she needed a D&C at 9 weeks PP for retained placental products. Pt with H.O SVT and palpitations, S/P cardiac ablation in 2017, pt denies any episodes since and is not followed by cardiology. Pt is obese BMI is 31.3. Pt takes PNV and ASA 81/162. Pt denies VB LOF or CTX, Sono today, report attached.

## 2024-07-29 ENCOUNTER — NON-APPOINTMENT (OUTPATIENT)
Age: 29
End: 2024-07-29

## 2024-07-29 DIAGNOSIS — Z34.90 ENCOUNTER FOR SUPERVISION OF NORMAL PREGNANCY, UNSPECIFIED, UNSPECIFIED TRIMESTER: ICD-10-CM

## 2024-07-29 DIAGNOSIS — O13.3 GESTATIONAL [PREGNANCY-INDUCED] HYPERTENSION W/OUT SIGNIFICANT PROTEINURIA, THIRD TRIMESTER: ICD-10-CM

## 2024-07-29 DIAGNOSIS — R63.8 OTHER SYMPTOMS AND SIGNS CONCERNING FOOD AND FLUID INTAKE: ICD-10-CM

## 2024-07-30 ENCOUNTER — NON-APPOINTMENT (OUTPATIENT)
Age: 29
End: 2024-07-30

## 2024-07-30 ENCOUNTER — APPOINTMENT (OUTPATIENT)
Dept: OBGYN | Facility: CLINIC | Age: 29
End: 2024-07-30
Payer: COMMERCIAL

## 2024-07-30 VITALS
WEIGHT: 175 LBS | SYSTOLIC BLOOD PRESSURE: 133 MMHG | HEIGHT: 63 IN | DIASTOLIC BLOOD PRESSURE: 86 MMHG | HEART RATE: 105 BPM | BODY MASS INDEX: 31.01 KG/M2

## 2024-07-30 DIAGNOSIS — Z3A.13 13 WEEKS GESTATION OF PREGNANCY: ICD-10-CM

## 2024-07-30 DIAGNOSIS — Z3A.12 12 WEEKS GESTATION OF PREGNANCY: ICD-10-CM

## 2024-07-30 LAB
ADDITIONAL US: NORMAL
COMMENTS: AFP: NORMAL
CRL SCAN TWIN B: NORMAL
CRL SCAN: NORMAL
CROWN RUMP LENGTH TWIN B: NORMAL
CROWN RUMP LENGTH: 63.5 MM
DOWN SYNDROME AGE RISK: NORMAL
DOWN SYNDROME INTERPRETATION: NORMAL
DOWN SYNDROME SCREENING RISK: NORMAL
GEST. AGE ON COLLECTION DATE: 12.6 WEEKS
HCG MOM: 0.67
HCG VALUE: 51.9 IU/ML
MATERNAL AGE AT EDD: 29.3 YR
NOTE: AFP: NORMAL
NT MOM TWIN B: NORMAL
NT TWIN B: NORMAL
NUCHAL TRANSLUCENCY (NT): 1.9 MM
NUCHAL TRANSLUCENCY MOM: 1.39
NUMBER OF FETUSES: 1
PAPP-A MOM: 0.69
PAPP-A VALUE: 581 NG/ML
RACE: NORMAL
RESULTS AFP: NORMAL
SONOGRAPHER ID#: NORMAL
SUBMIT PART 2 SAMPLE USING: NORMAL
TEST RESULTS: AFP: NORMAL
TRISOMY 18 AGE RISK: NORMAL
TRISOMY 18 INTERPRETATION: NORMAL
TRISOMY 18 SCREENING RISK: NORMAL
WEIGHT AFP: 177 LBS

## 2024-07-30 PROCEDURE — 0502F SUBSEQUENT PRENATAL CARE: CPT

## 2024-07-30 PROCEDURE — 81002 URINALYSIS NONAUTO W/O SCOPE: CPT | Mod: NC

## 2024-07-31 PROBLEM — Z87.2 HISTORY OF URTICARIA: Status: RESOLVED | Noted: 2024-07-31 | Resolved: 2024-07-31

## 2024-07-31 PROBLEM — Z86.79 HISTORY OF SUPRAVENTRICULAR TACHYCARDIA: Status: RESOLVED | Noted: 2022-06-24 | Resolved: 2024-07-31

## 2024-07-31 PROBLEM — Z86.69 HISTORY OF OBSTRUCTIVE SLEEP APNEA: Status: RESOLVED | Noted: 2024-07-31 | Resolved: 2024-07-31

## 2024-08-01 ENCOUNTER — NON-APPOINTMENT (OUTPATIENT)
Age: 29
End: 2024-08-01

## 2024-08-01 ENCOUNTER — APPOINTMENT (OUTPATIENT)
Dept: CARDIOLOGY | Facility: CLINIC | Age: 29
End: 2024-08-01
Payer: COMMERCIAL

## 2024-08-01 VITALS
DIASTOLIC BLOOD PRESSURE: 60 MMHG | HEART RATE: 98 BPM | WEIGHT: 174 LBS | BODY MASS INDEX: 30.83 KG/M2 | SYSTOLIC BLOOD PRESSURE: 98 MMHG | OXYGEN SATURATION: 100 % | HEIGHT: 63 IN

## 2024-08-01 VITALS — SYSTOLIC BLOOD PRESSURE: 100 MMHG | DIASTOLIC BLOOD PRESSURE: 54 MMHG

## 2024-08-01 DIAGNOSIS — R55 SYNCOPE AND COLLAPSE: ICD-10-CM

## 2024-08-01 DIAGNOSIS — Z86.79 PERSONAL HISTORY OF OTHER DISEASES OF THE CIRCULATORY SYSTEM: ICD-10-CM

## 2024-08-01 DIAGNOSIS — Z86.69 PERSONAL HISTORY OF OTHER DISEASES OF THE NERVOUS SYSTEM AND SENSE ORGANS: ICD-10-CM

## 2024-08-01 DIAGNOSIS — Z87.2 PERSONAL HISTORY OF DISEASES OF THE SKIN AND SUBCUTANEOUS TISSUE: ICD-10-CM

## 2024-08-01 DIAGNOSIS — R00.2 PALPITATIONS: ICD-10-CM

## 2024-08-01 DIAGNOSIS — O99.419 DISEASES OF THE CIRCULATORY SYSTEM COMPLICATING PREGNANCY, UNSPECIFIED TRIMESTER: ICD-10-CM

## 2024-08-01 DIAGNOSIS — I47.10 DISEASES OF THE CIRCULATORY SYSTEM COMPLICATING PREGNANCY, UNSPECIFIED TRIMESTER: ICD-10-CM

## 2024-08-01 DIAGNOSIS — O09.299 SUPERVISION OF PREGNANCY WITH OTHER POOR REPRODUCTIVE OR OBSTETRIC HISTORY, UNSPECIFIED TRIMESTER: ICD-10-CM

## 2024-08-01 PROCEDURE — 99214 OFFICE O/P EST MOD 30 MIN: CPT | Mod: 25

## 2024-08-01 PROCEDURE — 93000 ELECTROCARDIOGRAM COMPLETE: CPT

## 2024-08-01 NOTE — CARDIOLOGY SUMMARY
[de-identified] : ECG today shows sinus rhythm with normal intervals [de-identified] :  Holter 2/8/17 sinus rhythm was the predominant rhythm; HR between 47 to 106 bpm; 14 runs of non-sustained atrial tachycardia; occasional PVCs and PACs; no VT.  [de-identified] : Exercise stress test 2/7/2017 she exercised for 14 min and 26 secs; peak  bpm, peak /86 mm Hg. No ECG evidence of ischemia at a maximal stress level.  [de-identified] : TTE 8/29/2018 Normal bi-ventricular size and function.  No valve disease.  Estimated right ventricular systolic pressure equals 29 mm Hg.  No pericardial effusion.   TTE 1/10/2023 1. Left ventricular ejection fraction, by visual estimation, is 55 to 60%.  2. Normal global left ventricular systolic function.  3. The left ventricular diastolic function could not be assessed in this study.  4. Trace mitral valve regurgitation.  5. Adequate TR velocity was not obtained to accurately assess RVSP [de-identified] : EP study 8/15/2017 dual AV node physiology, no inducible SVT; ablation of the retrograde and antegrade slow pathways [de-identified] : Carotid US 1/20/17 no hemodynamically significant stenosis [de-identified] : Home sleep study 3/3/17 minimal to mild BRENDA; minimal hypoxia

## 2024-08-01 NOTE — PHYSICAL EXAM

## 2024-08-01 NOTE — REASON FOR VISIT
[Arrhythmia/ECG Abnorrmalities] : arrhythmia/ECG abnormalities [FreeTextEntry3] : Amena Santillan MD

## 2024-08-01 NOTE — DISCUSSION/SUMMARY
[EKG obtained to assist in diagnosis and management of assessed problem(s)] : EKG obtained to assist in diagnosis and management of assessed problem(s) [FreeTextEntry1] : Ms. Vladimir Painting is a 28-year-old woman with palpitations, prior vasovagal syncope and reported history of ablation procedure for an accessory pathway in the past. She had an elevated LDL in the past as well. She was referred specifically for arrhythmia management in pregnancy.  She has a history of pregnancy-induced hypertension and is currently on aspirin therapy managed by her OBGYN. She has palpitations that are likely related to pregnancy but not improving. They affect her quality of life. Pregnancy causes an increase in plasma volume, hormonal-mediated changes, and autonomic changes which are all potential mechanisms contributing to an increased propensity for arrhythmias during pregnancy.  Treatment of tachy-arrhythmias needs to be tailored to the individual with consideration of the type of arrhythmia, underlying heart disease, ventricular function, and the severity of symptoms. She had a completely normal echocardiogram from 2018. Supra-ventricular tachycardia (SVT) is the most common arrhythmia in pregnant women and usually occurs in women with structurally normal hearts. Most women with SVT can be treated medically. In women with acute SVT who are hemodynamically stable during pregnancy, vagal maneuvers and adenosine can used to terminate the arrhythmias. For women with a contraindication to adenosine or in whom adenosine is ineffective in pregnancy, intravenous beta blockers such as metoprolol or propranolol can be used and are safe. She is currently not on any anti-arrhythmic or rate controlling medications. I do think it is prudent to order a 3-day Zio monitor to assess for burden of arrhythmia especially given her history of ablation for an arrhythmia in the past and prior syncope (albeit sounds vasovagal). She agreed to undergo testing with a heart monitor.  She has had no recurrence within the past month. There is no reason to suspect a structural cardiac cause of the syncope in the past based on exam and her echo from 2023.  We discussed that she ideally should have a  transport her around. She tells me that she always has a warning sign alert her prior to vasovagal syncope.   I recommend follow up with me in 16 weeks for follow up. I do think it is important to recheck a lipid profile about 6-12 weeks postpartum given the history of elevated LDL in the past. She should continue to follow with her OBGYN. All of her questions were answered to her satisfaction. She verbalized understanding and is agreeable with plan of care.   Thank you for the opportunity to participate in the care of Vladimir. Please do not hesitate to reach out to me with questions or concerns.   Abdoul Diez DO, [facility] Cardio-Obstetrics Cardiologist Adult Congenital Heart Disease Cardiologist [address], [address], [place] [address]

## 2024-08-01 NOTE — PHYSICAL EXAM

## 2024-08-01 NOTE — CARDIOLOGY SUMMARY
[de-identified] : ECG today shows sinus rhythm with normal intervals [de-identified] :  Holter 2/8/17 sinus rhythm was the predominant rhythm; HR between 47 to 106 bpm; 14 runs of non-sustained atrial tachycardia; occasional PVCs and PACs; no VT.  [de-identified] : Exercise stress test 2/7/2017 she exercised for 14 min and 26 secs; peak  bpm, peak /86 mm Hg. No ECG evidence of ischemia at a maximal stress level.  [de-identified] : TTE 8/29/2018 Normal bi-ventricular size and function.  No valve disease.  Estimated right ventricular systolic pressure equals 29 mm Hg.  No pericardial effusion.   TTE 1/10/2023 1. Left ventricular ejection fraction, by visual estimation, is 55 to 60%.  2. Normal global left ventricular systolic function.  3. The left ventricular diastolic function could not be assessed in this study.  4. Trace mitral valve regurgitation.  5. Adequate TR velocity was not obtained to accurately assess RVSP [de-identified] : EP study 8/15/2017 dual AV node physiology, no inducible SVT; ablation of the retrograde and antegrade slow pathways [de-identified] : Carotid US 1/20/17 no hemodynamically significant stenosis [de-identified] : Home sleep study 3/3/17 minimal to mild BRENDA; minimal hypoxia

## 2024-08-01 NOTE — HISTORY OF PRESENT ILLNESS
[FreeTextEntry1] : Dear Colleagues,   Our mutual patient, Ms. Vladimir Painting, was seen at the [facility] located in [place], [place], on 24. She is a 28-year-old woman with palpitations, minimal to mild obstructive sleep apnea based on 2017 home sleep study, prior vasovagal syncope during pregnancy and dual atrioventricular vita physiology-antegrade/retrograde post ablation of antegrade and retrograde slow pathway on 8/15/2017. She had an elevated LDL in the past. She was referred specifically for arrhythmia management in pregnancy. She is currently around 15 weeks pregnant.   I had the opportunity of meeting Vladimir for the first time today in clinic. She was unaccompanied during the visit.  She was seen by Dr. Jaclyn Rosales, cardiologist, on 2018 and most recently evaluated by Dr. Zenon Alfaro, cardiologist, on 2018. Dr. Alfaro recommended an echocardiogram. See results below.    Today, she describes worsening palpitations that have been getting progressively worse. These symptoms are concerning for her and not improving. She had episodes of what appears to be vasovagal syncope earlier in this pregnancy.  She denies near syncope or syncope today. She denies angina. She goes on to mention that she developed palpitations during her first pregnancy at 35 weeks' gestation. She did not require a cardioversion.    She has no personal history of myocardial infarction, coronary artery disease, heart failure, or valvular disease. Her most recent A1c was 5.5% on 7/15/24 and her TSH was 2.09 (normal). Her most recent lipid panel was , HDL 64,  and TG 57 on 3/9/2021. She has prior history of syncope at 15 years old post treatment of left axillary abscess and vasovagal syncope in pregnancy. She had an ablation for dual AV node physiology in 2017. She reportedly has a diagnosis of mild sleep apnea by home test in 2017. Other past medical and surgical history listed below.   Social history: she does not smoke cigarettes, drink alcohol or use illicit drugs. She does not exercise on a routine basis but "chases" her daughter around. She works in the [employer].  Other social history provided below.   Family history: no known family history of premature coronary heart disease, sudden cardiac death, congenital heart disease or device therapy. Other family history listed below.    Pregnancy history includes prior  delivery on 2023 where she delivered a baby girl at 7 lbs 8 oz. She has 1 child. She reportedly had gestational hypertension during that pregnancy toward the time of delivery.

## 2024-08-01 NOTE — HISTORY OF PRESENT ILLNESS
[FreeTextEntry1] : Dear Colleagues,   Our mutual patient, Ms. Vladimir Painting, was seen at the [facility] located in [place], [place], on 24. She is a 28-year-old woman with palpitations, minimal to mild obstructive sleep apnea based on 2017 home sleep study, prior vasovagal syncope during pregnancy and dual atrioventricular vita physiology-antegrade/retrograde post ablation of antegrade and retrograde slow pathway on 8/15/2017. She had an elevated LDL in the past. She was referred specifically for arrhythmia management in pregnancy. She is currently around 15 weeks pregnant.   I had the opportunity of meeting Vladimir for the first time today in clinic. She was unaccompanied during the visit.  She was seen by Dr. Jaclyn Rosales, cardiologist, on 2018 and most recently evaluated by Dr. Zenon Alfaro, cardiologist, on 2018. Dr. Alfaro recommended an echocardiogram. See results below.    Today, she describes worsening palpitations that have been getting progressively worse. These symptoms are concerning for her and not improving. She had episodes of what appears to be vasovagal syncope earlier in this pregnancy.  She denies near syncope or syncope today. She denies angina. She goes on to mention that she developed palpitations during her first pregnancy at 35 weeks' gestation. She did not require a cardioversion.    She has no personal history of myocardial infarction, coronary artery disease, heart failure, or valvular disease. Her most recent A1c was 5.5% on 7/15/24 and her TSH was 2.09 (normal). Her most recent lipid panel was , HDL 64,  and TG 57 on 3/9/2021. She has prior history of syncope at 15 years old post treatment of left axillary abscess and vasovagal syncope in pregnancy. She had an ablation for dual AV node physiology in 2017. She reportedly has a diagnosis of mild sleep apnea by home test in 2017. Other past medical and surgical history listed below.   Social history: she does not smoke cigarettes, drink alcohol or use illicit drugs. She does not exercise on a routine basis but "chases" her daughter around. She works in the [employer].  Other social history provided below.   Family history: no known family history of premature coronary heart disease, sudden cardiac death, congenital heart disease or device therapy. Other family history listed below.    Pregnancy history includes prior  delivery on 2023 where she delivered a baby girl at 7 lbs 8 oz. She has 1 child. She reportedly had gestational hypertension during that pregnancy toward the time of delivery.

## 2024-08-01 NOTE — DISCUSSION/SUMMARY
[EKG obtained to assist in diagnosis and management of assessed problem(s)] : EKG obtained to assist in diagnosis and management of assessed problem(s) [FreeTextEntry1] : Ms. Vladimir Painting is a 28-year-old woman with palpitations, prior vasovagal syncope and reported history of ablation procedure for an accessory pathway in the past. She had an elevated LDL in the past as well. She was referred specifically for arrhythmia management in pregnancy.  She has a history of pregnancy-induced hypertension and is currently on aspirin therapy managed by her OBGYN. She has palpitations that are likely related to pregnancy but not improving. They affect her quality of life. Pregnancy causes an increase in plasma volume, hormonal-mediated changes, and autonomic changes which are all potential mechanisms contributing to an increased propensity for arrhythmias during pregnancy.  Treatment of tachy-arrhythmias needs to be tailored to the individual with consideration of the type of arrhythmia, underlying heart disease, ventricular function, and the severity of symptoms. She had a completely normal echocardiogram from 2018. Supra-ventricular tachycardia (SVT) is the most common arrhythmia in pregnant women and usually occurs in women with structurally normal hearts. Most women with SVT can be treated medically. In women with acute SVT who are hemodynamically stable during pregnancy, vagal maneuvers and adenosine can used to terminate the arrhythmias. For women with a contraindication to adenosine or in whom adenosine is ineffective in pregnancy, intravenous beta blockers such as metoprolol or propranolol can be used and are safe. She is currently not on any anti-arrhythmic or rate controlling medications. I do think it is prudent to order a 3-day Zio monitor to assess for burden of arrhythmia especially given her history of ablation for an arrhythmia in the past and prior syncope (albeit sounds vasovagal). She agreed to undergo testing with a heart monitor.  She has had no recurrence within the past month. There is no reason to suspect a structural cardiac cause of the syncope in the past based on exam and her echo from 2023.  We discussed that she ideally should have a  transport her around. She tells me that she always has a warning sign alert her prior to vasovagal syncope.   I recommend follow up with me in 16 weeks for follow up. I do think it is important to recheck a lipid profile about 6-12 weeks postpartum given the history of elevated LDL in the past. She should continue to follow with her OBGYN. All of her questions were answered to her satisfaction. She verbalized understanding and is agreeable with plan of care.   Thank you for the opportunity to participate in the care of Vladimir. Please do not hesitate to reach out to me with questions or concerns.   Abdoul Diez DO, [facility] Cardiologist Adult Congenital Heart Disease Cardiologist [address], [address], [place] [address]

## 2024-08-20 ENCOUNTER — APPOINTMENT (OUTPATIENT)
Dept: ANTEPARTUM | Facility: CLINIC | Age: 29
End: 2024-08-20
Payer: COMMERCIAL

## 2024-08-20 PROCEDURE — 36415 COLL VENOUS BLD VENIPUNCTURE: CPT

## 2024-08-27 ENCOUNTER — NON-APPOINTMENT (OUTPATIENT)
Age: 29
End: 2024-08-27

## 2024-08-27 ENCOUNTER — APPOINTMENT (OUTPATIENT)
Dept: OBGYN | Facility: CLINIC | Age: 29
End: 2024-08-27
Payer: COMMERCIAL

## 2024-08-27 VITALS
HEART RATE: 91 BPM | HEIGHT: 63 IN | SYSTOLIC BLOOD PRESSURE: 116 MMHG | BODY MASS INDEX: 31.71 KG/M2 | DIASTOLIC BLOOD PRESSURE: 75 MMHG | WEIGHT: 179 LBS

## 2024-08-27 DIAGNOSIS — Z86.61 PERSONAL HISTORY OF INFECTIONS OF THE CENTRAL NERVOUS SYSTEM: ICD-10-CM

## 2024-08-27 DIAGNOSIS — N89.8 OTHER SPECIFIED NONINFLAMMATORY DISORDERS OF VAGINA: ICD-10-CM

## 2024-08-27 DIAGNOSIS — Z11.3 ENCOUNTER FOR SCREENING FOR INFECTIONS WITH A PREDOMINANTLY SEXUAL MODE OF TRANSMISSION: ICD-10-CM

## 2024-08-27 DIAGNOSIS — Z3A.17 17 WEEKS GESTATION OF PREGNANCY: ICD-10-CM

## 2024-08-27 DIAGNOSIS — R55 SYNCOPE AND COLLAPSE: ICD-10-CM

## 2024-08-27 DIAGNOSIS — Z3A.13 13 WEEKS GESTATION OF PREGNANCY: ICD-10-CM

## 2024-08-27 LAB
ADDITIONAL US: NORMAL
AFP MOM: 1.48
AFP VALUE: 42.1 NG/ML
COLLECTED ON 2: NORMAL
COLLECTED ON: NORMAL
CRL SCAN TWIN B: NORMAL
CRL SCAN: NORMAL
CROWN RUMP LENGTH TWIN B: NORMAL
CROWN RUMP LENGTH: 63.5 MM
DIA MOM: 0.72
DIA VALUE: 100.5 PG/ML
DOWN SYNDROME AGE RISK: NORMAL
DOWN SYNDROME INTERPRETATION: NORMAL
DOWN SYNDROME SCREENING RISK: NORMAL
FIRST TRIMESTER SAMPLE: NORMAL
GEST. AGE ON COLLECTION DATE: 12.6 WEEKS
GESTATIONAL AGE: 16.1 WEEKS
HCG MOM: 0.69
HCG VALUE: 20.9 IU/ML
INSULIN DEP DIABETES: NO
MATERNAL AGE AT EDD: 29.3 YR
NT MOM TWIN B: NORMAL
NT TWIN B: NORMAL
NUCHAL TRANSLUCENCY (NT): 1.9 MM
NUCHAL TRANSLUCENCY MOM: 1.39
NUMBER OF FETUSES: 1
OPEN SPINA BIFIDA: NORMAL
OSB INTERPRETATION: NORMAL
PAPP-A MOM: 0.69
PAPP-A VALUE: 581 NG/ML
RACE: NORMAL
SECOND TRIMESTER SAMPLE: NORMAL
SEQUENTIAL 2 COMMENTS: NORMAL
SEQUENTIAL 2 NOTE: NORMAL
SEQUENTIAL 2 RESULTS: NORMAL
SEQUENTIAL 2 TEST RESULTS: NORMAL
SONOGRAPHER ID#: NORMAL
TRISOMY 18 AGE RISK: NORMAL
TRISOMY 18 INTERPRETATION: NORMAL
TRISOMY 18 SCREENING RISK: NORMAL
UE3 MOM: 0.97
UE3 VALUE: 0.91 NG/ML
WEIGHT AFP: 177 LBS
WEIGHT: 180 LBS

## 2024-08-27 PROCEDURE — 81002 URINALYSIS NONAUTO W/O SCOPE: CPT | Mod: NC

## 2024-08-27 PROCEDURE — 0502F SUBSEQUENT PRENATAL CARE: CPT

## 2024-09-02 ENCOUNTER — NON-APPOINTMENT (OUTPATIENT)
Age: 29
End: 2024-09-02

## 2024-09-11 NOTE — OB RN DELIVERY SUMMARY - NSSKINTOSKINA_OBGYN_ALL_OB_END_DATE
Pt is A&Ox4. Appears normally developed with no deformities. May be overweight. Good hygiene, fair grooming (pt's top was not fastened properly). Pt was cooperative and maintained good eye contact. Good relatedness, normal impulse control, normal muscle tone/strength, no abnormal movements. Gait normal, steady. Speech was normal in rate, rhythm, volume, articulation. Mood was normal and affect was euthymic, with full range and congruency. Thinking was linear, with normal associations and unremarkable content. No perceptual abnormalities were noted (denied AH/VH). Normal attention/concentration. Displayed average intelligence and normal fund of knowledge as demonstrated by educational attainment (PHD) and use of vocabulary during conversation. Recent and remote memory were normal. No abnormalities noted to patient language. Good judgement and insight.
20-Jan-2023 23:50

## 2024-09-16 ENCOUNTER — ASOB RESULT (OUTPATIENT)
Age: 29
End: 2024-09-16

## 2024-09-16 ENCOUNTER — APPOINTMENT (OUTPATIENT)
Dept: ANTEPARTUM | Facility: CLINIC | Age: 29
End: 2024-09-16
Payer: COMMERCIAL

## 2024-09-16 PROCEDURE — 76817 TRANSVAGINAL US OBSTETRIC: CPT

## 2024-09-16 PROCEDURE — 76811 OB US DETAILED SNGL FETUS: CPT

## 2024-09-19 ENCOUNTER — NON-APPOINTMENT (OUTPATIENT)
Age: 29
End: 2024-09-19

## 2024-09-19 DIAGNOSIS — Z87.42 PERSONAL HISTORY OF OTHER DISEASES OF THE FEMALE GENITAL TRACT: ICD-10-CM

## 2024-09-19 DIAGNOSIS — Z3A.17 17 WEEKS GESTATION OF PREGNANCY: ICD-10-CM

## 2024-09-19 DIAGNOSIS — O44.40 LOW LYING PLACENTA NOS OR WITHOUT HEMORRHAGE, UNSPECIFIED TRIMESTER: ICD-10-CM

## 2024-09-19 DIAGNOSIS — R11.0 OTHER SPECIFIED PREGNANCY RELATED CONDITIONS, UNSPECIFIED TRIMESTER: ICD-10-CM

## 2024-09-19 DIAGNOSIS — O26.899 OTHER SPECIFIED PREGNANCY RELATED CONDITIONS, UNSPECIFIED TRIMESTER: ICD-10-CM

## 2024-09-19 DIAGNOSIS — Z11.3 ENCOUNTER FOR SCREENING FOR INFECTIONS WITH A PREDOMINANTLY SEXUAL MODE OF TRANSMISSION: ICD-10-CM

## 2024-09-27 ENCOUNTER — NON-APPOINTMENT (OUTPATIENT)
Age: 29
End: 2024-09-27

## 2024-09-27 ENCOUNTER — APPOINTMENT (OUTPATIENT)
Dept: OBGYN | Facility: CLINIC | Age: 29
End: 2024-09-27
Payer: COMMERCIAL

## 2024-09-27 VITALS
SYSTOLIC BLOOD PRESSURE: 151 MMHG | HEIGHT: 63 IN | WEIGHT: 180 LBS | HEART RATE: 116 BPM | BODY MASS INDEX: 31.89 KG/M2 | DIASTOLIC BLOOD PRESSURE: 96 MMHG

## 2024-09-27 PROBLEM — Z3A.21 21 WEEKS GESTATION OF PREGNANCY: Status: ACTIVE | Noted: 2024-09-27

## 2024-09-27 PROCEDURE — 81002 URINALYSIS NONAUTO W/O SCOPE: CPT | Mod: NC

## 2024-09-27 PROCEDURE — 0502F SUBSEQUENT PRENATAL CARE: CPT

## 2024-09-30 ENCOUNTER — ASOB RESULT (OUTPATIENT)
Age: 29
End: 2024-09-30

## 2024-09-30 ENCOUNTER — APPOINTMENT (OUTPATIENT)
Dept: ANTEPARTUM | Facility: CLINIC | Age: 29
End: 2024-09-30
Payer: COMMERCIAL

## 2024-09-30 PROCEDURE — 76816 OB US FOLLOW-UP PER FETUS: CPT

## 2024-10-01 DIAGNOSIS — Z3A.21 21 WEEKS GESTATION OF PREGNANCY: ICD-10-CM

## 2024-10-09 NOTE — ED ADULT TRIAGE NOTE - SOURCE OF INFORMATION
10/09/24 12:30: Chart reviewed. At patient's visit with POOL Hinojosa on 9/18/24, an order was placed for patient to begin work conditioning program.    Attempts have been made by the [facility] rehab department to reach patient, but they have been unable to reach patient to assist with scheduling. (On 9/18, 9/19, and 9/20).    Writer attempted to reach patient today. A detailed message was left for patient asking him to call the [facility] outpatient rehab department to schedule as soon as possible.   [phone]    Writer also provided direct contact number in the event patient wanted to further discuss with writer.        
Patient

## 2024-10-10 NOTE — OB PROVIDER IHI INDUCTION/AUGMENTATION NOTE - LABOR: FETAL STATION
Provided recommendation for PT per patient's request, sent via [email or web address]:    Sofie Barahona PT   [address], [address], [place] [address]  phone: [phone]  fax: [phone]    Marnie Mota DO  Primary Care Sports Medicine Fellow  [facility]  
-3

## 2024-10-25 ENCOUNTER — NON-APPOINTMENT (OUTPATIENT)
Age: 29
End: 2024-10-25

## 2024-10-25 ENCOUNTER — APPOINTMENT (OUTPATIENT)
Dept: OBGYN | Facility: CLINIC | Age: 29
End: 2024-10-25
Payer: COMMERCIAL

## 2024-10-25 VITALS
BODY MASS INDEX: 33.49 KG/M2 | WEIGHT: 189 LBS | HEART RATE: 99 BPM | HEIGHT: 63 IN | DIASTOLIC BLOOD PRESSURE: 82 MMHG | SYSTOLIC BLOOD PRESSURE: 129 MMHG

## 2024-10-25 DIAGNOSIS — Z13.1 ENCOUNTER FOR SCREENING FOR DIABETES MELLITUS: ICD-10-CM

## 2024-10-25 DIAGNOSIS — Z13.0 ENCOUNTER FOR SCREENING FOR DISEASES OF THE BLOOD AND BLOOD-FORMING ORGANS AND CERTAIN DISORDERS INVOLVING THE IMMUNE MECHANISM: ICD-10-CM

## 2024-10-25 DIAGNOSIS — Z3A.25 25 WEEKS GESTATION OF PREGNANCY: ICD-10-CM

## 2024-10-25 PROCEDURE — 0502F SUBSEQUENT PRENATAL CARE: CPT

## 2024-10-25 PROCEDURE — 81002 URINALYSIS NONAUTO W/O SCOPE: CPT | Mod: NC

## 2024-10-28 ENCOUNTER — NON-APPOINTMENT (OUTPATIENT)
Age: 29
End: 2024-10-28

## 2024-10-29 LAB
BASOPHILS # BLD AUTO: 0.04 K/UL
BASOPHILS NFR BLD AUTO: 0.3 %
EOSINOPHIL # BLD AUTO: 0.1 K/UL
EOSINOPHIL NFR BLD AUTO: 0.7 %
GLUCOSE 1H P 50 G GLC PO SERPL-MCNC: 91 MG/DL
HCT VFR BLD CALC: 35.3 %
HGB BLD-MCNC: 11.4 G/DL
IMM GRANULOCYTES NFR BLD AUTO: 0.4 %
LYMPHOCYTES # BLD AUTO: 2.45 K/UL
LYMPHOCYTES NFR BLD AUTO: 18 %
MAN DIFF?: NORMAL
MCHC RBC-ENTMCNC: 30.5 PG
MCHC RBC-ENTMCNC: 32.3 GM/DL
MCV RBC AUTO: 94.4 FL
MONOCYTES # BLD AUTO: 0.71 K/UL
MONOCYTES NFR BLD AUTO: 5.2 %
NEUTROPHILS # BLD AUTO: 10.28 K/UL
NEUTROPHILS NFR BLD AUTO: 75.4 %
PLATELET # BLD AUTO: 200 K/UL
RBC # BLD: 3.74 M/UL
RBC # FLD: 13.1 %
WBC # FLD AUTO: 13.63 K/UL

## 2024-11-11 ENCOUNTER — ASOB RESULT (OUTPATIENT)
Age: 29
End: 2024-11-11

## 2024-11-11 ENCOUNTER — APPOINTMENT (OUTPATIENT)
Dept: ANTEPARTUM | Facility: CLINIC | Age: 29
End: 2024-11-11
Payer: COMMERCIAL

## 2024-11-11 PROCEDURE — 76816 OB US FOLLOW-UP PER FETUS: CPT

## 2024-11-11 PROCEDURE — 76817 TRANSVAGINAL US OBSTETRIC: CPT

## 2024-11-12 ENCOUNTER — NON-APPOINTMENT (OUTPATIENT)
Age: 29
End: 2024-11-12

## 2024-11-12 ENCOUNTER — LABORATORY RESULT (OUTPATIENT)
Age: 29
End: 2024-11-12

## 2024-11-12 ENCOUNTER — APPOINTMENT (OUTPATIENT)
Dept: OBGYN | Facility: CLINIC | Age: 29
End: 2024-11-12
Payer: COMMERCIAL

## 2024-11-12 VITALS
WEIGHT: 185 LBS | HEIGHT: 63 IN | HEART RATE: 112 BPM | SYSTOLIC BLOOD PRESSURE: 124 MMHG | RESPIRATION RATE: 16 BRPM | BODY MASS INDEX: 32.78 KG/M2 | DIASTOLIC BLOOD PRESSURE: 81 MMHG

## 2024-11-12 DIAGNOSIS — R82.90 UNSPECIFIED ABNORMAL FINDINGS IN URINE: ICD-10-CM

## 2024-11-12 DIAGNOSIS — Z13.1 ENCOUNTER FOR SCREENING FOR DIABETES MELLITUS: ICD-10-CM

## 2024-11-12 DIAGNOSIS — Z3A.25 25 WEEKS GESTATION OF PREGNANCY: ICD-10-CM

## 2024-11-12 DIAGNOSIS — Z13.0 ENCOUNTER FOR SCREENING FOR DISEASES OF THE BLOOD AND BLOOD-FORMING ORGANS AND CERTAIN DISORDERS INVOLVING THE IMMUNE MECHANISM: ICD-10-CM

## 2024-11-12 DIAGNOSIS — O12.00 GESTATIONAL EDEMA, UNSPECIFIED TRIMESTER: ICD-10-CM

## 2024-11-12 DIAGNOSIS — Z3A.28 28 WEEKS GESTATION OF PREGNANCY: ICD-10-CM

## 2024-11-12 PROCEDURE — 0502F SUBSEQUENT PRENATAL CARE: CPT

## 2024-11-12 PROCEDURE — 81003 URINALYSIS AUTO W/O SCOPE: CPT | Mod: NC,QW

## 2024-11-13 LAB
APPEARANCE: CLEAR
BILIRUB UR QL STRIP: NORMAL
BILIRUBIN URINE: NEGATIVE
BLOOD URINE: NEGATIVE
CLARITY UR: CLEAR
COLLECTION METHOD: NORMAL
COLOR: YELLOW
GLUCOSE QUALITATIVE U: NEGATIVE MG/DL
GLUCOSE UR-MCNC: NORMAL
HCG UR QL: 0.2 EU/DL
HGB UR QL STRIP.AUTO: NORMAL
KETONES UR-MCNC: NORMAL
KETONES URINE: NEGATIVE MG/DL
LEUKOCYTE ESTERASE UR QL STRIP: NORMAL
LEUKOCYTE ESTERASE URINE: ABNORMAL
NITRITE UR QL STRIP: NORMAL
NITRITE URINE: NEGATIVE
PH UR STRIP: 7.5
PH URINE: 8
PROT UR STRIP-MCNC: NORMAL
PROTEIN URINE: NORMAL MG/DL
SP GR UR STRIP: 1.02
SPECIFIC GRAVITY URINE: 1.02
UROBILINOGEN URINE: 0.2 MG/DL

## 2024-11-14 LAB — BACTERIA UR CULT: NORMAL

## 2024-11-18 ENCOUNTER — NON-APPOINTMENT (OUTPATIENT)
Age: 29
End: 2024-11-18

## 2024-12-03 ENCOUNTER — NON-APPOINTMENT (OUTPATIENT)
Age: 29
End: 2024-12-03

## 2024-12-03 ENCOUNTER — APPOINTMENT (OUTPATIENT)
Dept: OBGYN | Facility: CLINIC | Age: 29
End: 2024-12-03
Payer: COMMERCIAL

## 2024-12-03 VITALS
HEART RATE: 108 BPM | HEIGHT: 63 IN | BODY MASS INDEX: 33.13 KG/M2 | SYSTOLIC BLOOD PRESSURE: 123 MMHG | DIASTOLIC BLOOD PRESSURE: 83 MMHG | WEIGHT: 187 LBS | RESPIRATION RATE: 16 BRPM

## 2024-12-03 DIAGNOSIS — Z3A.31 31 WEEKS GESTATION OF PREGNANCY: ICD-10-CM

## 2024-12-03 DIAGNOSIS — Z11.3 ENCOUNTER FOR SCREENING FOR INFECTIONS WITH A PREDOMINANTLY SEXUAL MODE OF TRANSMISSION: ICD-10-CM

## 2024-12-03 DIAGNOSIS — N89.8 OTHER SPECIFIED NONINFLAMMATORY DISORDERS OF VAGINA: ICD-10-CM

## 2024-12-03 DIAGNOSIS — Z3A.28 28 WEEKS GESTATION OF PREGNANCY: ICD-10-CM

## 2024-12-03 DIAGNOSIS — O44.40 LOW LYING PLACENTA NOS OR WITHOUT HEMORRHAGE, UNSPECIFIED TRIMESTER: ICD-10-CM

## 2024-12-03 PROCEDURE — 0502F SUBSEQUENT PRENATAL CARE: CPT

## 2024-12-03 PROCEDURE — 81003 URINALYSIS AUTO W/O SCOPE: CPT | Mod: NC,QW

## 2024-12-05 ENCOUNTER — NON-APPOINTMENT (OUTPATIENT)
Age: 29
End: 2024-12-05

## 2024-12-05 ENCOUNTER — APPOINTMENT (OUTPATIENT)
Dept: CARDIOLOGY | Facility: CLINIC | Age: 29
End: 2024-12-05
Payer: COMMERCIAL

## 2024-12-05 VITALS
OXYGEN SATURATION: 99 % | DIASTOLIC BLOOD PRESSURE: 74 MMHG | HEIGHT: 63 IN | WEIGHT: 186 LBS | HEART RATE: 101 BPM | SYSTOLIC BLOOD PRESSURE: 119 MMHG | BODY MASS INDEX: 32.96 KG/M2

## 2024-12-05 DIAGNOSIS — I47.10 DISEASES OF THE CIRCULATORY SYSTEM COMPLICATING PREGNANCY, UNSPECIFIED TRIMESTER: ICD-10-CM

## 2024-12-05 DIAGNOSIS — O99.419 DISEASES OF THE CIRCULATORY SYSTEM COMPLICATING PREGNANCY, UNSPECIFIED TRIMESTER: ICD-10-CM

## 2024-12-05 DIAGNOSIS — R55 SYNCOPE AND COLLAPSE: ICD-10-CM

## 2024-12-05 DIAGNOSIS — R00.2 PALPITATIONS: ICD-10-CM

## 2024-12-05 LAB
BILIRUB UR QL STRIP: NORMAL
CLARITY UR: CLEAR
COLLECTION METHOD: NORMAL
GLUCOSE UR-MCNC: NORMAL
HCG UR QL: 0.2 EU/DL
HGB UR QL STRIP.AUTO: NORMAL
KETONES UR-MCNC: NORMAL
LEUKOCYTE ESTERASE UR QL STRIP: NORMAL
NITRITE UR QL STRIP: NORMAL
PH UR STRIP: 7
PROT UR STRIP-MCNC: NORMAL
SP GR UR STRIP: 1.03

## 2024-12-05 PROCEDURE — 93000 ELECTROCARDIOGRAM COMPLETE: CPT

## 2024-12-05 PROCEDURE — 99213 OFFICE O/P EST LOW 20 MIN: CPT | Mod: 25

## 2024-12-09 LAB
A VAGINAE DNA VAG QL NAA+PROBE: NORMAL
BVAB2 DNA VAG QL NAA+PROBE: NORMAL
C KRUSEI DNA VAG QL NAA+PROBE: NEGATIVE
C KRUSEI DNA VAG QL NAA+PROBE: POSITIVE
C TRACH RRNA SPEC QL NAA+PROBE: NEGATIVE
CANDIDA DNA VAG QL NAA+PROBE: NEGATIVE
MEGA1 DNA VAG QL NAA+PROBE: NORMAL
N GONORRHOEA RRNA SPEC QL NAA+PROBE: NEGATIVE
T VAGINALIS RRNA SPEC QL NAA+PROBE: NEGATIVE

## 2024-12-12 ENCOUNTER — APPOINTMENT (OUTPATIENT)
Dept: CARDIOLOGY | Facility: CLINIC | Age: 29
End: 2024-12-12
Payer: COMMERCIAL

## 2024-12-12 PROCEDURE — 93306 TTE W/DOPPLER COMPLETE: CPT

## 2024-12-13 ENCOUNTER — APPOINTMENT (OUTPATIENT)
Dept: CARDIOLOGY | Facility: CLINIC | Age: 29
End: 2024-12-13
Payer: COMMERCIAL

## 2024-12-13 PROCEDURE — 93970 EXTREMITY STUDY: CPT

## 2024-12-17 ENCOUNTER — NON-APPOINTMENT (OUTPATIENT)
Age: 29
End: 2024-12-17

## 2024-12-17 ENCOUNTER — APPOINTMENT (OUTPATIENT)
Dept: OBGYN | Facility: CLINIC | Age: 29
End: 2024-12-17
Payer: COMMERCIAL

## 2024-12-17 VITALS
WEIGHT: 187 LBS | SYSTOLIC BLOOD PRESSURE: 131 MMHG | HEART RATE: 118 BPM | HEIGHT: 63 IN | BODY MASS INDEX: 33.13 KG/M2 | RESPIRATION RATE: 16 BRPM | DIASTOLIC BLOOD PRESSURE: 84 MMHG

## 2024-12-17 DIAGNOSIS — Z87.42 PERSONAL HISTORY OF OTHER DISEASES OF THE FEMALE GENITAL TRACT: ICD-10-CM

## 2024-12-17 DIAGNOSIS — Z11.3 ENCOUNTER FOR SCREENING FOR INFECTIONS WITH A PREDOMINANTLY SEXUAL MODE OF TRANSMISSION: ICD-10-CM

## 2024-12-17 DIAGNOSIS — Z3A.33 33 WEEKS GESTATION OF PREGNANCY: ICD-10-CM

## 2024-12-17 DIAGNOSIS — B37.31 ACUTE CANDIDIASIS OF VULVA AND VAGINA: ICD-10-CM

## 2024-12-17 DIAGNOSIS — Z3A.31 31 WEEKS GESTATION OF PREGNANCY: ICD-10-CM

## 2024-12-17 LAB
BILIRUB UR QL STRIP: NEGATIVE
GLUCOSE UR-MCNC: NEGATIVE
HCG UR QL: 0.2 EU/DL
HGB UR QL STRIP.AUTO: NEGATIVE
KETONES UR-MCNC: NEGATIVE
LEUKOCYTE ESTERASE UR QL STRIP: NORMAL
NITRITE UR QL STRIP: NEGATIVE
PH UR STRIP: 7
PROT UR STRIP-MCNC: NORMAL
SP GR UR STRIP: 1.02

## 2024-12-17 PROCEDURE — 0502F SUBSEQUENT PRENATAL CARE: CPT

## 2024-12-17 PROCEDURE — 81002 URINALYSIS NONAUTO W/O SCOPE: CPT | Mod: NC

## 2024-12-18 ENCOUNTER — APPOINTMENT (OUTPATIENT)
Dept: CARDIOLOGY | Facility: CLINIC | Age: 29
End: 2024-12-18

## 2024-12-24 ENCOUNTER — NON-APPOINTMENT (OUTPATIENT)
Age: 29
End: 2024-12-24

## 2024-12-26 ENCOUNTER — NON-APPOINTMENT (OUTPATIENT)
Age: 29
End: 2024-12-26

## 2024-12-26 ENCOUNTER — APPOINTMENT (OUTPATIENT)
Dept: CARDIOLOGY | Facility: CLINIC | Age: 29
End: 2024-12-26
Payer: COMMERCIAL

## 2024-12-26 VITALS
HEIGHT: 63 IN | HEART RATE: 92 BPM | DIASTOLIC BLOOD PRESSURE: 76 MMHG | WEIGHT: 190 LBS | OXYGEN SATURATION: 100 % | BODY MASS INDEX: 33.66 KG/M2 | SYSTOLIC BLOOD PRESSURE: 126 MMHG

## 2024-12-26 DIAGNOSIS — R23.2 FLUSHING: ICD-10-CM

## 2024-12-26 DIAGNOSIS — R23.1 PALLOR: ICD-10-CM

## 2024-12-26 DIAGNOSIS — R00.2 PALPITATIONS: ICD-10-CM

## 2024-12-26 PROCEDURE — 93000 ELECTROCARDIOGRAM COMPLETE: CPT

## 2024-12-26 PROCEDURE — 99213 OFFICE O/P EST LOW 20 MIN: CPT | Mod: 25

## 2024-12-26 PROCEDURE — 93242 EXT ECG>48HR<7D RECORDING: CPT

## 2024-12-26 RX ORDER — TERCONAZOLE 8 MG/G
0.8 CREAM VAGINAL DAILY
Qty: 1 | Refills: 2 | Status: DISCONTINUED | COMMUNITY
Start: 2024-12-17 | End: 2024-12-26

## 2024-12-30 ENCOUNTER — NON-APPOINTMENT (OUTPATIENT)
Age: 29
End: 2024-12-30

## 2024-12-30 NOTE — ED ADULT NURSE NOTE - CAS DISCH TRANSFER METHOD

## 2024-12-31 ENCOUNTER — APPOINTMENT (OUTPATIENT)
Dept: OBGYN | Facility: CLINIC | Age: 29
End: 2024-12-31
Payer: COMMERCIAL

## 2024-12-31 VITALS
HEART RATE: 103 BPM | WEIGHT: 191 LBS | RESPIRATION RATE: 16 BRPM | SYSTOLIC BLOOD PRESSURE: 129 MMHG | BODY MASS INDEX: 33.84 KG/M2 | HEIGHT: 63 IN | DIASTOLIC BLOOD PRESSURE: 83 MMHG

## 2024-12-31 DIAGNOSIS — Z11.4 ENCOUNTER FOR SCREENING FOR HUMAN IMMUNODEFICIENCY VIRUS [HIV]: ICD-10-CM

## 2024-12-31 DIAGNOSIS — Z3A.33 33 WEEKS GESTATION OF PREGNANCY: ICD-10-CM

## 2024-12-31 DIAGNOSIS — Z13.0 ENCOUNTER FOR SCREENING FOR DISEASES OF THE BLOOD AND BLOOD-FORMING ORGANS AND CERTAIN DISORDERS INVOLVING THE IMMUNE MECHANISM: ICD-10-CM

## 2024-12-31 DIAGNOSIS — Z11.3 ENCOUNTER FOR SCREENING FOR INFECTIONS WITH A PREDOMINANTLY SEXUAL MODE OF TRANSMISSION: ICD-10-CM

## 2024-12-31 PROBLEM — Z3A.35 35 WEEKS GESTATION OF PREGNANCY: Status: ACTIVE | Noted: 2024-12-31

## 2024-12-31 PROBLEM — Z36.85 ENCOUNTER FOR ANTENATAL SCREENING FOR STREPTOCOCCUS B: Status: ACTIVE | Noted: 2024-12-31

## 2024-12-31 PROCEDURE — 81003 URINALYSIS AUTO W/O SCOPE: CPT | Mod: NC,QW

## 2024-12-31 PROCEDURE — 0502F SUBSEQUENT PRENATAL CARE: CPT

## 2025-01-02 LAB
BILIRUB UR QL STRIP: NORMAL
CLARITY UR: CLEAR
COLLECTION METHOD: NORMAL
GLUCOSE UR-MCNC: NORMAL
HCG UR QL: 0.2 EU/DL
HGB UR QL STRIP.AUTO: NORMAL
KETONES UR-MCNC: NORMAL
LEUKOCYTE ESTERASE UR QL STRIP: NORMAL
NITRITE UR QL STRIP: NORMAL
PH UR STRIP: 7.5
PROT UR STRIP-MCNC: NORMAL
SP GR UR STRIP: 1.02

## 2025-01-03 ENCOUNTER — NON-APPOINTMENT (OUTPATIENT)
Age: 30
End: 2025-01-03

## 2025-01-05 LAB — B-HEM STREP SPEC QL CULT: NORMAL

## 2025-01-06 ENCOUNTER — NON-APPOINTMENT (OUTPATIENT)
Age: 30
End: 2025-01-06

## 2025-01-07 ENCOUNTER — NON-APPOINTMENT (OUTPATIENT)
Age: 30
End: 2025-01-07

## 2025-01-07 ENCOUNTER — APPOINTMENT (OUTPATIENT)
Dept: OBGYN | Facility: CLINIC | Age: 30
End: 2025-01-07
Payer: COMMERCIAL

## 2025-01-07 VITALS
RESPIRATION RATE: 16 BRPM | HEIGHT: 63 IN | WEIGHT: 192 LBS | SYSTOLIC BLOOD PRESSURE: 124 MMHG | DIASTOLIC BLOOD PRESSURE: 83 MMHG | HEART RATE: 99 BPM | BODY MASS INDEX: 34.02 KG/M2

## 2025-01-07 DIAGNOSIS — Z3A.35 35 WEEKS GESTATION OF PREGNANCY: ICD-10-CM

## 2025-01-07 DIAGNOSIS — Z36.85 ENCOUNTER FOR ANTENATAL SCREENING FOR STREPTOCOCCUS B: ICD-10-CM

## 2025-01-07 DIAGNOSIS — Z86.19 PERSONAL HISTORY OF OTHER INFECTIOUS AND PARASITIC DISEASES: ICD-10-CM

## 2025-01-07 LAB
BILIRUB UR QL STRIP: NEGATIVE
GLUCOSE UR-MCNC: NEGATIVE
HCG UR QL: 0.2 EU/DL
HGB UR QL STRIP.AUTO: NEGATIVE
KETONES UR-MCNC: NEGATIVE
LEUKOCYTE ESTERASE UR QL STRIP: NEGATIVE
NITRITE UR QL STRIP: NEGATIVE
PH UR STRIP: 7
PROT UR STRIP-MCNC: NORMAL
SP GR UR STRIP: 1.02

## 2025-01-07 PROCEDURE — 81002 URINALYSIS NONAUTO W/O SCOPE: CPT | Mod: NC

## 2025-01-07 PROCEDURE — 0502F SUBSEQUENT PRENATAL CARE: CPT

## 2025-01-08 ENCOUNTER — APPOINTMENT (OUTPATIENT)
Dept: MATERNAL FETAL MEDICINE | Facility: CLINIC | Age: 30
End: 2025-01-08
Payer: COMMERCIAL

## 2025-01-08 ENCOUNTER — APPOINTMENT (OUTPATIENT)
Dept: ANTEPARTUM | Facility: CLINIC | Age: 30
End: 2025-01-08
Payer: COMMERCIAL

## 2025-01-08 ENCOUNTER — ASOB RESULT (OUTPATIENT)
Age: 30
End: 2025-01-08

## 2025-01-08 VITALS
OXYGEN SATURATION: 98 % | BODY MASS INDEX: 33.66 KG/M2 | SYSTOLIC BLOOD PRESSURE: 128 MMHG | RESPIRATION RATE: 18 BRPM | DIASTOLIC BLOOD PRESSURE: 74 MMHG | HEART RATE: 92 BPM | WEIGHT: 190 LBS | HEIGHT: 63 IN

## 2025-01-08 DIAGNOSIS — O09.899 SUPERVISION OF OTHER HIGH RISK PREGNANCIES, UNSPECIFIED TRIMESTER: ICD-10-CM

## 2025-01-08 DIAGNOSIS — Z86.79 PERSONAL HISTORY OF OTHER DISEASES OF THE CIRCULATORY SYSTEM: ICD-10-CM

## 2025-01-08 DIAGNOSIS — Z98.891 HISTORY OF UTERINE SCAR FROM PREVIOUS SURGERY: ICD-10-CM

## 2025-01-08 DIAGNOSIS — O09.219 SUPERVISION OF PREGNANCY WITH HISTORY OF PRE-TERM LABOR, UNSPECIFIED TRIMESTER: ICD-10-CM

## 2025-01-08 DIAGNOSIS — O09.299 SUPERVISION OF PREGNANCY WITH OTHER POOR REPRODUCTIVE OR OBSTETRIC HISTORY, UNSPECIFIED TRIMESTER: ICD-10-CM

## 2025-01-08 PROCEDURE — 76816 OB US FOLLOW-UP PER FETUS: CPT

## 2025-01-08 PROCEDURE — 76819 FETAL BIOPHYS PROFIL W/O NST: CPT

## 2025-01-08 PROCEDURE — 99215 OFFICE O/P EST HI 40 MIN: CPT

## 2025-01-09 ENCOUNTER — NON-APPOINTMENT (OUTPATIENT)
Age: 30
End: 2025-01-09

## 2025-01-09 PROBLEM — Z86.79 HISTORY OF SUPRAVENTRICULAR TACHYCARDIA: Status: RESOLVED | Noted: 2022-06-24 | Resolved: 2025-01-09

## 2025-01-09 PROBLEM — Z3A.36 36 WEEKS GESTATION OF PREGNANCY: Status: ACTIVE | Noted: 2025-01-07

## 2025-01-09 LAB
HCT VFR BLD CALC: 38.1 %
HGB BLD-MCNC: 12.4 G/DL
HIV1+2 AB SPEC QL IA.RAPID: NONREACTIVE
MCHC RBC-ENTMCNC: 30 PG
MCHC RBC-ENTMCNC: 32.5 G/DL
MCV RBC AUTO: 92.3 FL
PLATELET # BLD AUTO: 188 K/UL
RBC # BLD: 4.13 M/UL
RBC # FLD: 13.4 %
T PALLIDUM AB SER QL IA: NEGATIVE
WBC # FLD AUTO: 11.69 K/UL

## 2025-01-10 ENCOUNTER — NON-APPOINTMENT (OUTPATIENT)
Age: 30
End: 2025-01-10

## 2025-01-10 ENCOUNTER — APPOINTMENT (OUTPATIENT)
Dept: OBGYN | Facility: CLINIC | Age: 30
End: 2025-01-10
Payer: COMMERCIAL

## 2025-01-10 ENCOUNTER — LABORATORY RESULT (OUTPATIENT)
Age: 30
End: 2025-01-10

## 2025-01-10 VITALS
DIASTOLIC BLOOD PRESSURE: 84 MMHG | BODY MASS INDEX: 34.02 KG/M2 | HEIGHT: 63 IN | SYSTOLIC BLOOD PRESSURE: 127 MMHG | WEIGHT: 192 LBS | RESPIRATION RATE: 16 BRPM | HEART RATE: 105 BPM

## 2025-01-10 LAB
BILIRUB UR QL STRIP: NEGATIVE
GLUCOSE UR-MCNC: NEGATIVE
HCG UR QL: 0.2 EU/DL
HGB UR QL STRIP.AUTO: NEGATIVE
KETONES UR-MCNC: NEGATIVE
LEUKOCYTE ESTERASE UR QL STRIP: NORMAL
NITRITE UR QL STRIP: NEGATIVE
PH UR STRIP: 7
PROT UR STRIP-MCNC: NEGATIVE
SP GR UR STRIP: 1.02

## 2025-01-10 PROCEDURE — 81002 URINALYSIS NONAUTO W/O SCOPE: CPT | Mod: NC

## 2025-01-10 PROCEDURE — 0502F SUBSEQUENT PRENATAL CARE: CPT

## 2025-01-14 ENCOUNTER — APPOINTMENT (OUTPATIENT)
Dept: MATERNAL FETAL MEDICINE | Facility: CLINIC | Age: 30
End: 2025-01-14

## 2025-01-14 ENCOUNTER — APPOINTMENT (OUTPATIENT)
Dept: ANTEPARTUM | Facility: CLINIC | Age: 30
End: 2025-01-14

## 2025-01-15 LAB
APPEARANCE: ABNORMAL
BACTERIA UR CULT: NORMAL
BILIRUBIN URINE: NEGATIVE
BLOOD URINE: NEGATIVE
COLOR: YELLOW
GLUCOSE QUALITATIVE U: NEGATIVE MG/DL
KETONES URINE: NEGATIVE MG/DL
LEUKOCYTE ESTERASE URINE: ABNORMAL
NITRITE URINE: NEGATIVE
PH URINE: 7
PROTEIN URINE: NORMAL MG/DL
SPECIFIC GRAVITY URINE: 1.02
UROBILINOGEN URINE: 0.2 MG/DL

## 2025-01-17 ENCOUNTER — APPOINTMENT (OUTPATIENT)
Dept: OBGYN | Facility: CLINIC | Age: 30
End: 2025-01-17
Payer: COMMERCIAL

## 2025-01-17 ENCOUNTER — NON-APPOINTMENT (OUTPATIENT)
Age: 30
End: 2025-01-17

## 2025-01-17 VITALS
WEIGHT: 194 LBS | HEIGHT: 63 IN | DIASTOLIC BLOOD PRESSURE: 80 MMHG | BODY MASS INDEX: 34.38 KG/M2 | SYSTOLIC BLOOD PRESSURE: 120 MMHG | HEART RATE: 105 BPM | RESPIRATION RATE: 16 BRPM

## 2025-01-17 DIAGNOSIS — Z11.3 ENCOUNTER FOR SCREENING FOR INFECTIONS WITH A PREDOMINANTLY SEXUAL MODE OF TRANSMISSION: ICD-10-CM

## 2025-01-17 DIAGNOSIS — Z3A.36 36 WEEKS GESTATION OF PREGNANCY: ICD-10-CM

## 2025-01-17 DIAGNOSIS — Z11.4 ENCOUNTER FOR SCREENING FOR HUMAN IMMUNODEFICIENCY VIRUS [HIV]: ICD-10-CM

## 2025-01-17 DIAGNOSIS — Z13.0 ENCOUNTER FOR SCREENING FOR DISEASES OF THE BLOOD AND BLOOD-FORMING ORGANS AND CERTAIN DISORDERS INVOLVING THE IMMUNE MECHANISM: ICD-10-CM

## 2025-01-17 PROBLEM — Z3A.37 37 WEEKS GESTATION OF PREGNANCY: Status: ACTIVE | Noted: 2025-01-17

## 2025-01-17 PROCEDURE — 81002 URINALYSIS NONAUTO W/O SCOPE: CPT | Mod: NC

## 2025-01-17 PROCEDURE — 0502F SUBSEQUENT PRENATAL CARE: CPT

## 2025-01-20 ENCOUNTER — NON-APPOINTMENT (OUTPATIENT)
Age: 30
End: 2025-01-20

## 2025-01-20 ENCOUNTER — APPOINTMENT (OUTPATIENT)
Dept: OBGYN | Facility: CLINIC | Age: 30
End: 2025-01-20
Payer: COMMERCIAL

## 2025-01-20 VITALS
HEART RATE: 96 BPM | BODY MASS INDEX: 34.55 KG/M2 | SYSTOLIC BLOOD PRESSURE: 132 MMHG | WEIGHT: 195 LBS | DIASTOLIC BLOOD PRESSURE: 80 MMHG | HEIGHT: 63 IN

## 2025-01-20 DIAGNOSIS — Z3A.37 37 WEEKS GESTATION OF PREGNANCY: ICD-10-CM

## 2025-01-20 PROBLEM — Z3A.38 38 WEEKS GESTATION OF PREGNANCY: Status: ACTIVE | Noted: 2025-01-20

## 2025-01-20 PROCEDURE — 0502F SUBSEQUENT PRENATAL CARE: CPT

## 2025-01-20 PROCEDURE — 81002 URINALYSIS NONAUTO W/O SCOPE: CPT | Mod: NC

## 2025-01-21 ENCOUNTER — NON-APPOINTMENT (OUTPATIENT)
Age: 30
End: 2025-01-21

## 2025-01-21 RX ORDER — SODIUM CHLORIDE 9 G/ML
1000 INJECTION, SOLUTION INTRAVENOUS
Refills: 0 | Status: DISCONTINUED | OUTPATIENT
Start: 2025-01-26 | End: 2025-01-28

## 2025-01-21 RX ORDER — ANTISEPTIC SURGICAL SCRUB 0.04 MG/ML
1 SOLUTION TOPICAL DAILY
Refills: 0 | Status: DISCONTINUED | OUTPATIENT
Start: 2025-01-26 | End: 2025-01-26

## 2025-01-21 RX ORDER — CEFAZOLIN SODIUM IN 0.9 % NACL 2 G/10 ML
2000 SYRINGE (ML) INTRAVENOUS ONCE
Refills: 0 | Status: COMPLETED | OUTPATIENT
Start: 2025-01-26 | End: 2025-01-26

## 2025-01-21 RX ORDER — SODIUM CHLORIDE 9 G/ML
1000 INJECTION, SOLUTION INTRAVENOUS
Refills: 0 | Status: DISCONTINUED | OUTPATIENT
Start: 2025-01-26 | End: 2025-01-26

## 2025-01-23 ENCOUNTER — APPOINTMENT (OUTPATIENT)
Dept: OBGYN | Facility: CLINIC | Age: 30
End: 2025-01-23

## 2025-01-24 ENCOUNTER — OUTPATIENT (OUTPATIENT)
Dept: OUTPATIENT SERVICES | Facility: HOSPITAL | Age: 30
LOS: 1 days | End: 2025-01-24
Payer: COMMERCIAL

## 2025-01-24 DIAGNOSIS — Z01.812 ENCOUNTER FOR PREPROCEDURAL LABORATORY EXAMINATION: ICD-10-CM

## 2025-01-24 DIAGNOSIS — Z98.890 OTHER SPECIFIED POSTPROCEDURAL STATES: Chronic | ICD-10-CM

## 2025-01-24 DIAGNOSIS — Z98.891 HISTORY OF UTERINE SCAR FROM PREVIOUS SURGERY: Chronic | ICD-10-CM

## 2025-01-24 LAB
BLD GP AB SCN SERPL QL: SIGNIFICANT CHANGE UP
COMMENT - BLOOD BANK: SIGNIFICANT CHANGE UP
HCT VFR BLD CALC: 42 % — SIGNIFICANT CHANGE UP (ref 34.5–45)
HGB BLD-MCNC: 13.5 G/DL — SIGNIFICANT CHANGE UP (ref 11.5–15.5)
MCHC RBC-ENTMCNC: 29.3 PG — SIGNIFICANT CHANGE UP (ref 27–34)
MCHC RBC-ENTMCNC: 32.1 G/DL — SIGNIFICANT CHANGE UP (ref 32–36)
MCV RBC AUTO: 91.3 FL — SIGNIFICANT CHANGE UP (ref 80–100)
PLATELET # BLD AUTO: 193 K/UL — SIGNIFICANT CHANGE UP (ref 150–400)
RBC # BLD: 4.6 M/UL — SIGNIFICANT CHANGE UP (ref 3.8–5.2)
RBC # FLD: 13.2 % — SIGNIFICANT CHANGE UP (ref 10.3–14.5)
WBC # BLD: 11.38 K/UL — HIGH (ref 3.8–10.5)
WBC # FLD AUTO: 11.38 K/UL — HIGH (ref 3.8–10.5)

## 2025-01-24 PROCEDURE — 86900 BLOOD TYPING SEROLOGIC ABO: CPT

## 2025-01-24 PROCEDURE — 86850 RBC ANTIBODY SCREEN: CPT

## 2025-01-24 PROCEDURE — 36415 COLL VENOUS BLD VENIPUNCTURE: CPT

## 2025-01-24 PROCEDURE — 85027 COMPLETE CBC AUTOMATED: CPT

## 2025-01-24 PROCEDURE — 86901 BLOOD TYPING SEROLOGIC RH(D): CPT

## 2025-01-26 ENCOUNTER — INPATIENT (INPATIENT)
Facility: HOSPITAL | Age: 30
LOS: 1 days | Discharge: HOME | DRG: 833 | End: 2025-01-28
Attending: OBSTETRICS & GYNECOLOGY | Admitting: OBSTETRICS & GYNECOLOGY
Payer: COMMERCIAL

## 2025-01-26 ENCOUNTER — TRANSCRIPTION ENCOUNTER (OUTPATIENT)
Age: 30
End: 2025-01-26

## 2025-01-26 VITALS
SYSTOLIC BLOOD PRESSURE: 145 MMHG | HEART RATE: 88 BPM | WEIGHT: 195.11 LBS | RESPIRATION RATE: 18 BRPM | DIASTOLIC BLOOD PRESSURE: 88 MMHG | TEMPERATURE: 98 F | HEIGHT: 63 IN

## 2025-01-26 DIAGNOSIS — O34.219 MATERNAL CARE FOR UNSPECIFIED TYPE SCAR FROM PREVIOUS CESAREAN DELIVERY: ICD-10-CM

## 2025-01-26 DIAGNOSIS — Z98.891 HISTORY OF UTERINE SCAR FROM PREVIOUS SURGERY: Chronic | ICD-10-CM

## 2025-01-26 DIAGNOSIS — Z98.890 OTHER SPECIFIED POSTPROCEDURAL STATES: Chronic | ICD-10-CM

## 2025-01-26 LAB
ALBUMIN SERPL ELPH-MCNC: 3.4 G/DL — SIGNIFICANT CHANGE UP (ref 3.3–5.2)
ALP SERPL-CCNC: 152 U/L — HIGH (ref 40–120)
ALT FLD-CCNC: 9 U/L — SIGNIFICANT CHANGE UP
ANION GAP SERPL CALC-SCNC: 17 MMOL/L — SIGNIFICANT CHANGE UP (ref 5–17)
APPEARANCE UR: CLEAR — SIGNIFICANT CHANGE UP
APTT BLD: 26.4 SEC — SIGNIFICANT CHANGE UP (ref 24.5–35.6)
AST SERPL-CCNC: 19 U/L — SIGNIFICANT CHANGE UP
BACTERIA # UR AUTO: NEGATIVE /HPF — SIGNIFICANT CHANGE UP
BASOPHILS # BLD AUTO: 0.04 K/UL — SIGNIFICANT CHANGE UP (ref 0–0.2)
BASOPHILS NFR BLD AUTO: 0.4 % — SIGNIFICANT CHANGE UP (ref 0–2)
BILIRUB SERPL-MCNC: 0.3 MG/DL — LOW (ref 0.4–2)
BILIRUB UR-MCNC: NEGATIVE — SIGNIFICANT CHANGE UP
BLD GP AB SCN SERPL QL: SIGNIFICANT CHANGE UP
BUN SERPL-MCNC: 13.5 MG/DL — SIGNIFICANT CHANGE UP (ref 8–20)
CALCIUM SERPL-MCNC: 9 MG/DL — SIGNIFICANT CHANGE UP (ref 8.4–10.5)
CAST: 0 /LPF — SIGNIFICANT CHANGE UP (ref 0–4)
CHLORIDE SERPL-SCNC: 100 MMOL/L — SIGNIFICANT CHANGE UP (ref 96–108)
CO2 SERPL-SCNC: 19 MMOL/L — LOW (ref 22–29)
COLOR SPEC: YELLOW — SIGNIFICANT CHANGE UP
CREAT ?TM UR-MCNC: 49 MG/DL — SIGNIFICANT CHANGE UP
CREAT SERPL-MCNC: 0.57 MG/DL — SIGNIFICANT CHANGE UP (ref 0.5–1.3)
DIFF PNL FLD: NEGATIVE — SIGNIFICANT CHANGE UP
EGFR: 126 ML/MIN/1.73M2 — SIGNIFICANT CHANGE UP
EOSINOPHIL # BLD AUTO: 0.08 K/UL — SIGNIFICANT CHANGE UP (ref 0–0.5)
EOSINOPHIL NFR BLD AUTO: 0.7 % — SIGNIFICANT CHANGE UP (ref 0–6)
FIBRINOGEN PPP-MCNC: 597 MG/DL — HIGH (ref 200–450)
GLUCOSE SERPL-MCNC: 138 MG/DL — HIGH (ref 70–99)
GLUCOSE UR QL: NEGATIVE MG/DL — SIGNIFICANT CHANGE UP
HCT VFR BLD CALC: 38.8 % — SIGNIFICANT CHANGE UP (ref 34.5–45)
HGB BLD-MCNC: 12.8 G/DL — SIGNIFICANT CHANGE UP (ref 11.5–15.5)
IMM GRANULOCYTES NFR BLD AUTO: 0.4 % — SIGNIFICANT CHANGE UP (ref 0–0.9)
INR BLD: 0.9 RATIO — SIGNIFICANT CHANGE UP (ref 0.85–1.16)
KETONES UR-MCNC: 15 MG/DL
LDH SERPL L TO P-CCNC: 201 U/L — HIGH (ref 98–192)
LEUKOCYTE ESTERASE UR-ACNC: ABNORMAL
LYMPHOCYTES # BLD AUTO: 2.38 K/UL — SIGNIFICANT CHANGE UP (ref 1–3.3)
LYMPHOCYTES # BLD AUTO: 22.2 % — SIGNIFICANT CHANGE UP (ref 13–44)
MCHC RBC-ENTMCNC: 29.7 PG — SIGNIFICANT CHANGE UP (ref 27–34)
MCHC RBC-ENTMCNC: 33 G/DL — SIGNIFICANT CHANGE UP (ref 32–36)
MCV RBC AUTO: 90 FL — SIGNIFICANT CHANGE UP (ref 80–100)
MONOCYTES # BLD AUTO: 0.67 K/UL — SIGNIFICANT CHANGE UP (ref 0–0.9)
MONOCYTES NFR BLD AUTO: 6.2 % — SIGNIFICANT CHANGE UP (ref 2–14)
NEUTROPHILS # BLD AUTO: 7.52 K/UL — HIGH (ref 1.8–7.4)
NEUTROPHILS NFR BLD AUTO: 70.1 % — SIGNIFICANT CHANGE UP (ref 43–77)
NITRITE UR-MCNC: NEGATIVE — SIGNIFICANT CHANGE UP
PH UR: 6.5 — SIGNIFICANT CHANGE UP (ref 5–8)
PLATELET # BLD AUTO: 191 K/UL — SIGNIFICANT CHANGE UP (ref 150–400)
POTASSIUM SERPL-MCNC: 4 MMOL/L — SIGNIFICANT CHANGE UP (ref 3.5–5.3)
POTASSIUM SERPL-SCNC: 4 MMOL/L — SIGNIFICANT CHANGE UP (ref 3.5–5.3)
PROT ?TM UR-MCNC: 10 MG/DL — SIGNIFICANT CHANGE UP (ref 0–12)
PROT SERPL-MCNC: 7 G/DL — SIGNIFICANT CHANGE UP (ref 6.6–8.7)
PROT UR-MCNC: NEGATIVE MG/DL — SIGNIFICANT CHANGE UP
PROT/CREAT UR-RTO: 0.2 RATIO — SIGNIFICANT CHANGE UP
PROTHROM AB SERPL-ACNC: 10.2 SEC — SIGNIFICANT CHANGE UP (ref 9.9–13.4)
RBC # BLD: 4.31 M/UL — SIGNIFICANT CHANGE UP (ref 3.8–5.2)
RBC # FLD: 13 % — SIGNIFICANT CHANGE UP (ref 10.3–14.5)
RBC CASTS # UR COMP ASSIST: 0 /HPF — SIGNIFICANT CHANGE UP (ref 0–4)
SODIUM SERPL-SCNC: 136 MMOL/L — SIGNIFICANT CHANGE UP (ref 135–145)
SP GR SPEC: 1.01 — SIGNIFICANT CHANGE UP (ref 1–1.03)
SQUAMOUS # UR AUTO: 4 /HPF — SIGNIFICANT CHANGE UP (ref 0–5)
UROBILINOGEN FLD QL: 0.2 MG/DL — SIGNIFICANT CHANGE UP (ref 0.2–1)
WBC # BLD: 10.73 K/UL — HIGH (ref 3.8–10.5)
WBC # FLD AUTO: 10.73 K/UL — HIGH (ref 3.8–10.5)
WBC UR QL: 8 /HPF — HIGH (ref 0–5)

## 2025-01-26 PROCEDURE — 59510 CESAREAN DELIVERY: CPT

## 2025-01-26 PROCEDURE — 59025 FETAL NON-STRESS TEST: CPT | Mod: 26

## 2025-01-26 RX ORDER — ACETAMINOPHEN 160 MG/5ML
3 SUSPENSION ORAL
Qty: 84 | Refills: 0
Start: 2025-01-26 | End: 2025-02-01

## 2025-01-26 RX ORDER — SODIUM CHLORIDE 9 G/ML
1000 INJECTION, SOLUTION INTRAVENOUS
Refills: 0 | Status: DISCONTINUED | OUTPATIENT
Start: 2025-01-26 | End: 2025-01-28

## 2025-01-26 RX ORDER — DIPHENHYDRAMINE HCL 25 MG
12.5 CAPSULE ORAL EVERY 4 HOURS
Refills: 0 | Status: DISCONTINUED | OUTPATIENT
Start: 2025-01-26 | End: 2025-01-28

## 2025-01-26 RX ORDER — IBUPROFEN 600 MG/1
600 TABLET, FILM COATED ORAL EVERY 6 HOURS
Refills: 0 | Status: COMPLETED | OUTPATIENT
Start: 2025-01-26 | End: 2025-12-25

## 2025-01-26 RX ORDER — ACETAMINOPHEN 160 MG/5ML
975 SUSPENSION ORAL ONCE
Refills: 0 | Status: COMPLETED | OUTPATIENT
Start: 2025-01-26 | End: 2025-01-26

## 2025-01-26 RX ORDER — IBUPROFEN 600 MG/1
1 TABLET, FILM COATED ORAL
Qty: 28 | Refills: 0
Start: 2025-01-26 | End: 2025-02-01

## 2025-01-26 RX ORDER — ENOXAPARIN SODIUM 100 MG/ML
40 INJECTION SUBCUTANEOUS EVERY 24 HOURS
Refills: 0 | Status: DISCONTINUED | OUTPATIENT
Start: 2025-01-26 | End: 2025-01-28

## 2025-01-26 RX ORDER — KETOROLAC TROMETHAMINE 10 MG
15 TABLET ORAL EVERY 6 HOURS
Refills: 0 | Status: DISCONTINUED | OUTPATIENT
Start: 2025-01-26 | End: 2025-01-27

## 2025-01-26 RX ORDER — ACETAMINOPHEN 160 MG/5ML
975 SUSPENSION ORAL
Refills: 0 | Status: DISCONTINUED | OUTPATIENT
Start: 2025-01-26 | End: 2025-01-28

## 2025-01-26 RX ORDER — MAGNESIUM HYDROXIDE 400 MG/5ML
30 SUSPENSION, ORAL (FINAL DOSE FORM) ORAL
Refills: 0 | Status: DISCONTINUED | OUTPATIENT
Start: 2025-01-26 | End: 2025-01-28

## 2025-01-26 RX ORDER — OXYTOCIN/0.9 % SODIUM CHLORIDE 10/500ML
42 PLASTIC BAG, INJECTION (ML) INTRAVENOUS
Qty: 30 | Refills: 0 | Status: DISCONTINUED | OUTPATIENT
Start: 2025-01-26 | End: 2025-01-28

## 2025-01-26 RX ORDER — SODIUM CITRATE AND CITRIC ACID MONOHYDRATE 1002; 1500 MG/15ML; MG/15ML
30 SOLUTION ORAL ONCE
Refills: 0 | Status: COMPLETED | OUTPATIENT
Start: 2025-01-26 | End: 2025-01-26

## 2025-01-26 RX ORDER — OXYCODONE HYDROCHLORIDE 30 MG/1
5 TABLET ORAL
Refills: 0 | Status: DISCONTINUED | OUTPATIENT
Start: 2025-01-26 | End: 2025-01-28

## 2025-01-26 RX ORDER — DIPHENHYDRAMINE HCL 25 MG
25 CAPSULE ORAL EVERY 6 HOURS
Refills: 0 | Status: DISCONTINUED | OUTPATIENT
Start: 2025-01-26 | End: 2025-01-28

## 2025-01-26 RX ORDER — NALOXONE HYDROCHLORIDE 3 MG/.1ML
0.1 SPRAY NASAL
Refills: 0 | Status: DISCONTINUED | OUTPATIENT
Start: 2025-01-26 | End: 2025-01-28

## 2025-01-26 RX ORDER — ONDANSETRON 4 MG/1
4 TABLET, ORALLY DISINTEGRATING ORAL EVERY 6 HOURS
Refills: 0 | Status: DISCONTINUED | OUTPATIENT
Start: 2025-01-26 | End: 2025-01-28

## 2025-01-26 RX ORDER — ACETAMINOPHEN 160 MG/5ML
1000 SUSPENSION ORAL ONCE
Refills: 0 | Status: COMPLETED | OUTPATIENT
Start: 2025-01-26 | End: 2025-01-26

## 2025-01-26 RX ORDER — SCOPOLAMINE 1 MG/3D
1 PATCH, EXTENDED RELEASE TRANSDERMAL ONCE
Refills: 0 | Status: COMPLETED | OUTPATIENT
Start: 2025-01-26 | End: 2025-01-26

## 2025-01-26 RX ORDER — FAMOTIDINE 10 MG/ML
20 INJECTION INTRAVENOUS ONCE
Refills: 0 | Status: COMPLETED | OUTPATIENT
Start: 2025-01-26 | End: 2025-01-26

## 2025-01-26 RX ORDER — CLOSTRIDIUM TETANI TOXOID ANTIGEN (FORMALDEHYDE INACTIVATED), CORYNEBACTERIUM DIPHTHERIAE TOXOID ANTIGEN (FORMALDEHYDE INACTIVATED), BORDETELLA PERTUSSIS TOXOID ANTIGEN (GLUTARALDEHYDE INACTIVATED), BORDETELLA PERTUSSIS FILAMENTOUS HEMAGGLUTININ ANTIGEN (FORMALDEHYDE INACTIVATED), BORDETELLA PERTUSSIS PERTACTIN ANTIGEN, AND BORDETELLA PERTUSSIS FIMBRIAE 2/3 ANTIGEN 5; 2; 2.5; 5; 3; 5 [LF]/.5ML; [LF]/.5ML; UG/.5ML; UG/.5ML; UG/.5ML; UG/.5ML
0.5 INJECTION, SUSPENSION INTRAMUSCULAR ONCE
Refills: 0 | Status: DISCONTINUED | OUTPATIENT
Start: 2025-01-26 | End: 2025-01-28

## 2025-01-26 RX ADMIN — SCOPOLAMINE 1 PATCH: 1 PATCH, EXTENDED RELEASE TRANSDERMAL at 05:18

## 2025-01-26 RX ADMIN — SCOPOLAMINE 1 PATCH: 1 PATCH, EXTENDED RELEASE TRANSDERMAL at 07:44

## 2025-01-26 RX ADMIN — Medication 15 MILLIGRAM(S): at 23:42

## 2025-01-26 RX ADMIN — SODIUM CHLORIDE 125 MILLILITER(S): 9 INJECTION, SOLUTION INTRAVENOUS at 07:41

## 2025-01-26 RX ADMIN — Medication 42 MILLIUNIT(S)/MIN: at 09:44

## 2025-01-26 RX ADMIN — SODIUM CITRATE AND CITRIC ACID MONOHYDRATE 30 MILLILITER(S): 1002; 1500 SOLUTION ORAL at 05:18

## 2025-01-26 RX ADMIN — SODIUM CHLORIDE 125 MILLILITER(S): 9 INJECTION, SOLUTION INTRAVENOUS at 06:41

## 2025-01-26 RX ADMIN — SODIUM CHLORIDE 200 MILLILITER(S): 9 INJECTION, SOLUTION INTRAVENOUS at 07:41

## 2025-01-26 RX ADMIN — SODIUM CHLORIDE 200 MILLILITER(S): 9 INJECTION, SOLUTION INTRAVENOUS at 05:18

## 2025-01-26 RX ADMIN — Medication 15 MILLIGRAM(S): at 17:12

## 2025-01-26 RX ADMIN — ACETAMINOPHEN 400 MILLIGRAM(S): 160 SUSPENSION ORAL at 14:04

## 2025-01-26 RX ADMIN — Medication 2000 MILLIGRAM(S): at 08:50

## 2025-01-26 RX ADMIN — ACETAMINOPHEN 975 MILLIGRAM(S): 160 SUSPENSION ORAL at 05:18

## 2025-01-26 RX ADMIN — SODIUM CHLORIDE 125 MILLILITER(S): 9 INJECTION, SOLUTION INTRAVENOUS at 14:04

## 2025-01-26 RX ADMIN — ACETAMINOPHEN 975 MILLIGRAM(S): 160 SUSPENSION ORAL at 21:07

## 2025-01-26 RX ADMIN — ANTISEPTIC SURGICAL SCRUB 1 APPLICATION(S): 0.04 SOLUTION TOPICAL at 07:41

## 2025-01-26 RX ADMIN — ENOXAPARIN SODIUM 40 MILLIGRAM(S): 100 INJECTION SUBCUTANEOUS at 21:07

## 2025-01-26 RX ADMIN — ACETAMINOPHEN 975 MILLIGRAM(S): 160 SUSPENSION ORAL at 07:44

## 2025-01-26 RX ADMIN — FAMOTIDINE 20 MILLIGRAM(S): 10 INJECTION INTRAVENOUS at 05:19

## 2025-01-26 NOTE — OB PROVIDER H&P - ALERT: PERTINENT HISTORY
Daughter/Self
1st Trimester Sonogram/20 Week Level II Sonogram/Non Invasive Prenatal Screen (NIPS)/Ultra Screen at 12 Weeks

## 2025-01-26 NOTE — DISCHARGE NOTE OB - MEDICATION SUMMARY - MEDICATIONS TO STOP TAKING
I will STOP taking the medications listed below when I get home from the hospital:  None I will STOP taking the medications listed below when I get home from the hospital:    cephalexin 500 mg oral capsule  -- 1 cap(s) by mouth every 6 hours   -- Finish all this medication unless otherwise directed by prescriber.    metroNIDAZOLE 500 mg oral tablet  -- 1 tab(s) by mouth 2 times a day

## 2025-01-26 NOTE — DISCHARGE NOTE OB - FINANCIAL ASSISTANCE
[facility] provides services at a reduced cost to those who are determined to be eligible through [facility]’s financial assistance program. Information regarding [facility]’s financial assistance program can be found by going to [email or web address] or by calling [phone].

## 2025-01-26 NOTE — DISCHARGE NOTE OB - MEDICATION SUMMARY - MEDICATIONS TO TAKE
I will START or STAY ON the medications listed below when I get home from the hospital:    ibuprofen 600 mg oral tablet  -- 1 tab(s) by mouth every 6 hours  -- Indication: For pain    Tylenol 325 mg oral tablet  -- 3 tab(s) by mouth every 6 hours  -- Indication: For pain    Prenatal Multivitamins with Folic Acid 1 mg oral tablet  -- 1 tab(s) by mouth once a day  -- Indication: For postpartum supplementation

## 2025-01-26 NOTE — OB RN DELIVERY SUMMARY - NS_SEPSISRSKCALC_OBGYN_ALL_OB_FT
EOS calculated successfully. EOS Risk Factor: 0.5/1000 live births ([facility] national incidence); GA=39w;Temp=98.42; ROM=0.033; GBS='Unknown'; Antibiotics='No antibiotics or any antibiotics < 2 hrs prior to birth'

## 2025-01-26 NOTE — OB PROVIDER H&P - NSHPPHYSICALEXAM_GEN_ALL_CORE
Vitals:   T(C): --  T(F): --  HR: --  BP: --  RR: --  SpO2: --    General: AAOx3, NAD  Abd: Soft, nontender, gravid    BMI:   BMI (kg/m2): 34.6 (01-26-25)    FHT: baseline at 130bpm, mod aarti, + accel, - decel  Bynum: irregular

## 2025-01-26 NOTE — OB PROVIDER DELIVERY SUMMARY - NSPROVIDERDELIVERYNOTE_OBGYN_ALL_OB_FT
Findings: viable female  with Apgars 8/9, weight 3940 g, cephalic presentation. Grossly normal uterus, b/l fallopian tubes, and b/l ovaries.    Patient was taken to the OR, a low transverse  section was performed and a viable infant was delivered atraumatically with vacuum assistance, nuchal cord x1. Placenta delivered, intact. Hysterotomy, peritoneum, rectus muscles, fascia, subcutaneous tissue, and skin were reapproximated with suture. Excellent hemostasis was obtained at each layer of closure.    Dictation by Dr. ROB

## 2025-01-26 NOTE — OB RN PATIENT PROFILE - NSICDXPASTSURGICALHX_GEN_ALL_CORE_FT
What Type Of Note Output Would You Prefer (Optional)?: Standard Output Hpi Title: Evaluation of Skin Lesions How Severe Are Your Spot(S)?: mild Have Your Spot(S) Been Treated In The Past?: has not been treated PAST SURGICAL HISTORY:  H/O  section     H/O prior ablation treatment     History of ankle surgery

## 2025-01-26 NOTE — DISCHARGE NOTE OB - PATIENT PORTAL LINK FT
You can access the FollowMyHealth Patient Portal offered by [facility] by registering at the following website: [email or web address]. By joining [facility]s FollowMyHealth portal, you will also be able to view your health information using other applications (apps) compatible with our system.

## 2025-01-26 NOTE — OB PROVIDER H&P - NSINFECTIONS_OBGYN_ALL_OB
[facility] received a fax that requires your attention. The document has been indexed to the patient’s chart for your review.      Reason for sending: [facility] APPROVAL FOR CT NECK    Documents Description: EXT MED [id number]    Name of Sender: LAUREN    Date Indexed: 09.13.23    Notes (if needed):     
Unknown at this time

## 2025-01-26 NOTE — DISCHARGE NOTE OB - CARE PROVIDER_API CALL
Amena Santillan  Obstetrics and Gynecology  [address] [place] [id number]  Phone: [phone]  Fax: [phone]  Follow Up Time:

## 2025-01-26 NOTE — OB PROVIDER DELIVERY SUMMARY - NSSELHIDDEN_OBGYN_ALL_OB_FT
[NS_DeliveryAttending1_OBGYN_ALL_OB_FT:[id number]],[NS_DeliveryAssist1_OBGYN_ALL_OB_FT:[id number]],[NS_DeliveryRN_OBGYN_ALL_OB_FT:[id number]]

## 2025-01-26 NOTE — OB RN INTRAOPERATIVE NOTE - NSSELHIDDEN_OBGYN_ALL_OB_FT
[NS_DeliveryAttending1_OBGYN_ALL_OB_FT:[id number]],[NS_DeliveryAssist1_OBGYN_ALL_OB_FT:[id number]],[NS_DeliveryRN_OBGYN_ALL_OB_FT:[id number]=]

## 2025-01-26 NOTE — OB PROVIDER H&P - NSPREVIOUSLIVEBIR_OBGYN_ALL_OB

## 2025-01-26 NOTE — DISCHARGE NOTE OB - HOSPITAL COURSE
Patient underwent a repeat  delivery. Post-op course was uncomplicated. Pain is well controlled with PRN medication. She has no difficulty with ambulation, voiding, or PO intake. Lab values and vital signs are within normal limits prior to discharge.

## 2025-01-26 NOTE — DISCHARGE NOTE OB - OTHER SIGNIFICANT FINDINGS
asymptomatic anemia secondary to acute blood loss at delivery  Monitored and treated with PNV, Folic Acid, Vitamin c, Fe

## 2025-01-26 NOTE — OB RN INTRAOPERATIVE NOTE - NS_FROZENSECTION_OBGYN_ALL_OB
Date Of Surgery - Today Or Tomorrow?: today
Identified Risk Factors Documented?: yes
Risk Assessment Explanation (Does Not Render In The Note): Clinical determination of the probability and/or consequences of an event, such as surgery. Clinical assessment of the level of risk is affected by the nature of the event under consideration for the patient. Modifier 57 is used to indicate an Evaluation and Management (E/M) service resulted in the initial decision to perform surgery either the day before a major surgery (90 day global) or the day of a major surgery.
Discussion: Decision for surgery refers to any surgeries performed today, or discussion of treatment in future.  In general, decision for repair is not made until the final extent of the surgical wound is known.
Complexity (Necessary For Coding; Major - 90 Day Global With Some Exceptions; Minor - 10 Day Global): major
No

## 2025-01-26 NOTE — DISCHARGE NOTE OB - CARE PLAN
Principal Discharge DX:	Status post repeat low transverse  section  Assessment and plan of treatment:	Patient had  section. Hospital course was uncomplicated. Patient should plan to make an appointment with the office within 1 week for an incision check and 6 weeks for postpartum follow-up. Postpartum precautions were given at the time of discharge.   1

## 2025-01-26 NOTE — OB RN PATIENT PROFILE - PRO PRENATAL LABS ORI SOURCE RUBELLA
Can you please let patient know her MRI brain looks normal?  I do not see any abnormalities that would be causing her vertigo.
Spoke to patient's daughter and let her know below information. Let them know to continue with Dr. Feliz Graves recommendations- vestibular therapy, and ent consult. Provided her numbers to schedule.
hard copy, drawn during this pregnancy

## 2025-01-26 NOTE — OB NEONATOLOGY/PEDIATRICIAN DELIVERY SUMMARY - NSPEDSNEONOTESA_OBGYN_ALL_OB_FT
Requested by DR Santillan to attend a RC/S of a 30y/o  at 39 weeks GA secondary to vacuum assisted delivery.  She had + PNC, is blood type A pos, HIV neg, HBsAg neg, Syphilis neg, Rubella Imm, GBS Unk, Hep C  neg.  L&D:  AROM at delivery with clear fluids.  Baby born vertex with vacuum assist, DCC x 30 sec, good cry, transferred to warmer, orally suctioned, dried, and examined. Infant showed to father and then transferred to mother for STS.  BW:  3940g  A/P:  FT LGA female  Transition to Regular Nursery under Peds Hospitalist care.  Monitor glucose as per Guidelines

## 2025-01-26 NOTE — OB RN DELIVERY SUMMARY - NSSELHIDDEN_OBGYN_ALL_OB_FT
[NS_DeliveryAttending1_OBGYN_ALL_OB_FT:[id number]==],[NS_DeliveryAssist1_OBGYN_ALL_OB_FT:[id number]],[NS_DeliveryRN_OBGYN_ALL_OB_FT:[id number]=]

## 2025-01-26 NOTE — OB PROVIDER H&P - ASSESSMENT
A/P: MELANI GRISSOM is a 28yo  at 39w GA who presents to L&D triage for scheduled rCS.        Admission labs  Hx of preeclampsia in prior pregnancy, BPs wnl thus far per pt report, asymptomatic, to f/u PIH labs  Consent  NPO, IVF, ancef  Cephalic, irregularly erick  FHT category I  Spinal anesthesia  For OR for CS    D/w Dr Santillan

## 2025-01-26 NOTE — OB PROVIDER H&P - HISTORY OF PRESENT ILLNESS
MELANI GRISSOM is a 28yo  at 39w GA who presents to L&D triage for scheduled rCS.      Denies leakage of fluids, vaginal bleeding, painful contractions. Reports active fetal movement.   Denies other symptoms.   [facility] @Dr Santillan    OBhx: pCS  for NRFHT, postpartum course c/b retained placenta, requiring D&C, pregnancy c/b preeclampsia  Gyn: denies hx of fibroids, cysts; denies abn pap smears; denies STIs  PMH: SVT, s/p cardiac ablation at age 21, follows with Cardiology, has been evaluated via Holter monitor readings twice in this pregnancy (2024 and 2025), has occasional palpitations, not on medications  PSH: cardiac ablation, pCS, ankle surgery  Med: PNV, ASA (stopped), folic acid  Allergies: NKDA  SH: denies smoking hx, denies alcohol or other drug use in pregnancy; lives with  and daughter, feels safe at home and in relationship

## 2025-01-26 NOTE — OB PROVIDER H&P - NSMODPPHRISK_OBGYN_A_OB_CAL
----- Message from Frank Fermin DO sent at 6/16/2023  5:37 PM EDT -----  Can you please let the patient know echo was normal
Pt 's Wife-Hattie made aware of echo and stress test results pt 's wife would like to keep follow up appt  and decide at that time for the combo form of Lisinopril-HCTZ 
4
Aicha Harry(Attending)

## 2025-01-27 LAB
ALBUMIN SERPL ELPH-MCNC: 2.7 G/DL — LOW (ref 3.3–5.2)
ALP SERPL-CCNC: 118 U/L — SIGNIFICANT CHANGE UP (ref 40–120)
ALT FLD-CCNC: 9 U/L — SIGNIFICANT CHANGE UP
ANION GAP SERPL CALC-SCNC: 9 MMOL/L — SIGNIFICANT CHANGE UP (ref 5–17)
AST SERPL-CCNC: 19 U/L — SIGNIFICANT CHANGE UP
BASOPHILS # BLD AUTO: 0.04 K/UL — SIGNIFICANT CHANGE UP (ref 0–0.2)
BASOPHILS NFR BLD AUTO: 0.3 % — SIGNIFICANT CHANGE UP (ref 0–2)
BILIRUB SERPL-MCNC: <0.2 MG/DL — LOW (ref 0.4–2)
BUN SERPL-MCNC: 14.9 MG/DL — SIGNIFICANT CHANGE UP (ref 8–20)
CALCIUM SERPL-MCNC: 8.4 MG/DL — SIGNIFICANT CHANGE UP (ref 8.4–10.5)
CHLORIDE SERPL-SCNC: 105 MMOL/L — SIGNIFICANT CHANGE UP (ref 96–108)
CO2 SERPL-SCNC: 22 MMOL/L — SIGNIFICANT CHANGE UP (ref 22–29)
CREAT SERPL-MCNC: 0.57 MG/DL — SIGNIFICANT CHANGE UP (ref 0.5–1.3)
EGFR: 126 ML/MIN/1.73M2 — SIGNIFICANT CHANGE UP
EOSINOPHIL # BLD AUTO: 0.04 K/UL — SIGNIFICANT CHANGE UP (ref 0–0.5)
EOSINOPHIL NFR BLD AUTO: 0.3 % — SIGNIFICANT CHANGE UP (ref 0–6)
GLUCOSE SERPL-MCNC: 100 MG/DL — HIGH (ref 70–99)
HCT VFR BLD CALC: 33.8 % — LOW (ref 34.5–45)
HGB BLD-MCNC: 11.2 G/DL — LOW (ref 11.5–15.5)
IMM GRANULOCYTES NFR BLD AUTO: 0.5 % — SIGNIFICANT CHANGE UP (ref 0–0.9)
LYMPHOCYTES # BLD AUTO: 23.1 % — SIGNIFICANT CHANGE UP (ref 13–44)
LYMPHOCYTES # BLD AUTO: 3.29 K/UL — SIGNIFICANT CHANGE UP (ref 1–3.3)
MCHC RBC-ENTMCNC: 30 PG — SIGNIFICANT CHANGE UP (ref 27–34)
MCHC RBC-ENTMCNC: 33.1 G/DL — SIGNIFICANT CHANGE UP (ref 32–36)
MCV RBC AUTO: 90.6 FL — SIGNIFICANT CHANGE UP (ref 80–100)
MONOCYTES # BLD AUTO: 0.98 K/UL — HIGH (ref 0–0.9)
MONOCYTES NFR BLD AUTO: 6.9 % — SIGNIFICANT CHANGE UP (ref 2–14)
NEUTROPHILS # BLD AUTO: 9.85 K/UL — HIGH (ref 1.8–7.4)
NEUTROPHILS NFR BLD AUTO: 68.9 % — SIGNIFICANT CHANGE UP (ref 43–77)
PLATELET # BLD AUTO: 168 K/UL — SIGNIFICANT CHANGE UP (ref 150–400)
POTASSIUM SERPL-MCNC: 4.6 MMOL/L — SIGNIFICANT CHANGE UP (ref 3.5–5.3)
POTASSIUM SERPL-SCNC: 4.6 MMOL/L — SIGNIFICANT CHANGE UP (ref 3.5–5.3)
PROT SERPL-MCNC: 5.6 G/DL — LOW (ref 6.6–8.7)
RBC # BLD: 3.73 M/UL — LOW (ref 3.8–5.2)
RBC # FLD: 13.2 % — SIGNIFICANT CHANGE UP (ref 10.3–14.5)
SODIUM SERPL-SCNC: 136 MMOL/L — SIGNIFICANT CHANGE UP (ref 135–145)
WBC # BLD: 14.27 K/UL — HIGH (ref 3.8–10.5)
WBC # FLD AUTO: 14.27 K/UL — HIGH (ref 3.8–10.5)

## 2025-01-27 RX ORDER — PNV WITH CALCIUM NO.11/IRON/FA 65 MG-1 MG
1 TABLET ORAL DAILY
Refills: 0 | Status: DISCONTINUED | OUTPATIENT
Start: 2025-01-27 | End: 2025-01-28

## 2025-01-27 RX ORDER — FERROUS SULFATE 325(65) MG
325 TABLET ORAL DAILY
Refills: 0 | Status: DISCONTINUED | OUTPATIENT
Start: 2025-01-27 | End: 2025-01-28

## 2025-01-27 RX ORDER — IBUPROFEN 600 MG/1
600 TABLET, FILM COATED ORAL EVERY 6 HOURS
Refills: 0 | Status: DISCONTINUED | OUTPATIENT
Start: 2025-01-27 | End: 2025-01-28

## 2025-01-27 RX ORDER — ASCORBIC ACID 500 MG/ML
500 VIAL (ML) INJECTION DAILY
Refills: 0 | Status: DISCONTINUED | OUTPATIENT
Start: 2025-01-27 | End: 2025-01-28

## 2025-01-27 RX ORDER — FOLIC ACID 1 MG
1 TABLET ORAL DAILY
Refills: 0 | Status: DISCONTINUED | OUTPATIENT
Start: 2025-01-27 | End: 2025-01-28

## 2025-01-27 RX ADMIN — IBUPROFEN 600 MILLIGRAM(S): 600 TABLET, FILM COATED ORAL at 19:00

## 2025-01-27 RX ADMIN — Medication 500 MILLIGRAM(S): at 12:15

## 2025-01-27 RX ADMIN — ACETAMINOPHEN 975 MILLIGRAM(S): 160 SUSPENSION ORAL at 21:18

## 2025-01-27 RX ADMIN — Medication 325 MILLIGRAM(S): at 12:16

## 2025-01-27 RX ADMIN — ACETAMINOPHEN 975 MILLIGRAM(S): 160 SUSPENSION ORAL at 02:49

## 2025-01-27 RX ADMIN — Medication 15 MILLIGRAM(S): at 05:21

## 2025-01-27 RX ADMIN — Medication 15 MILLIGRAM(S): at 12:14

## 2025-01-27 RX ADMIN — ACETAMINOPHEN 975 MILLIGRAM(S): 160 SUSPENSION ORAL at 10:21

## 2025-01-27 RX ADMIN — Medication 1 TABLET(S): at 12:15

## 2025-01-27 RX ADMIN — ACETAMINOPHEN 975 MILLIGRAM(S): 160 SUSPENSION ORAL at 15:33

## 2025-01-27 RX ADMIN — ENOXAPARIN SODIUM 40 MILLIGRAM(S): 100 INJECTION SUBCUTANEOUS at 21:18

## 2025-01-27 RX ADMIN — Medication 1 MILLIGRAM(S): at 12:15

## 2025-01-28 ENCOUNTER — NON-APPOINTMENT (OUTPATIENT)
Age: 30
End: 2025-01-28

## 2025-01-28 VITALS
HEART RATE: 96 BPM | TEMPERATURE: 98 F | DIASTOLIC BLOOD PRESSURE: 86 MMHG | RESPIRATION RATE: 18 BRPM | OXYGEN SATURATION: 96 % | SYSTOLIC BLOOD PRESSURE: 128 MMHG

## 2025-01-28 LAB — T PALLIDUM AB TITR SER: NEGATIVE — SIGNIFICANT CHANGE UP

## 2025-01-28 PROCEDURE — 83615 LACTATE (LD) (LDH) ENZYME: CPT

## 2025-01-28 PROCEDURE — 36415 COLL VENOUS BLD VENIPUNCTURE: CPT

## 2025-01-28 PROCEDURE — 86901 BLOOD TYPING SEROLOGIC RH(D): CPT

## 2025-01-28 PROCEDURE — 80053 COMPREHEN METABOLIC PANEL: CPT

## 2025-01-28 PROCEDURE — 81001 URINALYSIS AUTO W/SCOPE: CPT

## 2025-01-28 PROCEDURE — 85384 FIBRINOGEN ACTIVITY: CPT

## 2025-01-28 PROCEDURE — 82570 ASSAY OF URINE CREATININE: CPT

## 2025-01-28 PROCEDURE — 59050 FETAL MONITOR W/REPORT: CPT

## 2025-01-28 PROCEDURE — 84156 ASSAY OF PROTEIN URINE: CPT

## 2025-01-28 PROCEDURE — 86850 RBC ANTIBODY SCREEN: CPT

## 2025-01-28 PROCEDURE — 86780 TREPONEMA PALLIDUM: CPT

## 2025-01-28 PROCEDURE — 86900 BLOOD TYPING SEROLOGIC ABO: CPT

## 2025-01-28 PROCEDURE — 85610 PROTHROMBIN TIME: CPT

## 2025-01-28 PROCEDURE — 85730 THROMBOPLASTIN TIME PARTIAL: CPT

## 2025-01-28 PROCEDURE — 85025 COMPLETE CBC W/AUTO DIFF WBC: CPT

## 2025-01-28 RX ADMIN — ACETAMINOPHEN 975 MILLIGRAM(S): 160 SUSPENSION ORAL at 03:05

## 2025-01-28 RX ADMIN — ACETAMINOPHEN 975 MILLIGRAM(S): 160 SUSPENSION ORAL at 08:44

## 2025-01-28 RX ADMIN — IBUPROFEN 600 MILLIGRAM(S): 600 TABLET, FILM COATED ORAL at 00:19

## 2025-01-28 RX ADMIN — IBUPROFEN 600 MILLIGRAM(S): 600 TABLET, FILM COATED ORAL at 05:40

## 2025-01-28 NOTE — PROGRESS NOTE ADULT - SUBJECTIVE AND OBJECTIVE BOX
POD # 1    Patient resting comfortably in NAD   Afebrile VSS  Abdomen  soft   fundus  firm  extrem. No Homans  Lochia  nl  Voiding +  Flatus Neg    WBC 14.27  H/H 11.2/33.8  Platel. 168  A Pos    Patient s/p R c/s  Continue post op care    Asymptomatic anemia secondary to acute blood loss at delivery  Monitored and treated with PNV, Folic Acid, Vitamin C, Fe
INTERVAL HPI/OVERNIGHT EVENTS:  29y Female s/p repeat c section under spinal anesthesia with duramorph for post op analgesia on 01/26/25    Vital Signs Last 24 Hrs  T(C): 36.6 (27 Jan 2025 08:07), Max: 36.9 (26 Jan 2025 13:25)  T(F): 97.8 (27 Jan 2025 08:07), Max: 98.4 (26 Jan 2025 13:25)  HR: 70 (27 Jan 2025 08:07) (61 - 85)  BP: 128/84 (27 Jan 2025 08:07) (109/64 - 128/84)  BP(mean): --  RR: 18 (27 Jan 2025 08:07) (18 - 18)  SpO2: 99% (27 Jan 2025 08:07) (90% - 99%)    Parameters below as of 27 Jan 2025 04:00  Patient On (Oxygen Delivery Method): room air            Patient's overall anesthesia satisfaction: Positive    Patients pain is well controlled with IT duramorph    No respiratory events overnight    No pruritis at this time    Patient doing well     No headache      No residual numbness or weakness, sensory and motor function intact.    No anesthetic complications or complaints noted or reported          .            
MELANI GRISSOM is a 29y  s/p rCS @39w, now with gHTN (PI wnl, P:C 0.2).    POD1    No acute events overnight.  Patient has no complaints.  Pain is well controlled.  +flatus, + voiding, -BM.  Ambulating and tolerating PO.  Appropriate lochia.  Denies fever, chills, nausea, and vomiting.  She denies lightheadedness, dizziness, HA, blurry vision, palpitations, chest pain and SOB.     Physical exam:  General: AOx3, NAD.  Abdomen: Soft, appropriately tender to palpitation, fundus firm.  Incision: Dressing to be removed, otherwise clean and dry   : voiding   Vaginal: expectant lochia  Ext: No DVT signs, warm extremities.    Vital Signs Last 24 Hrs  T(C): 36.5 (2025 04:00), Max: 36.9 (2025 07:44)  T(F): 97.7 (2025 04:00), Max: 98.42 (2025 07:44)  HR: 85 (2025 04:00) (57 - 90)  BP: 120/77 (2025 04:00) (108/56 - 138/67)  BP(mean): --  RR: 18 (2025 04:00) (15 - 18)  SpO2: 98% (2025 04:00) (90% - 98%)    Parameters below as of 2025 04:00  Patient On (Oxygen Delivery Method): room air        LABS:                        11.2   14.27 )-----------( 168      ( 2025 04:40 )             33.8         136  |  105  |  14.9  ----------------------------<  100[H]  4.6   |  22.0  |  0.57    Ca    8.4      2025 04:40    TPro  5.6[L]  /  Alb  2.7[L]  /  TBili  <0.2[L]  /  DBili  x   /  AST  19  /  ALT  9   /  AlkPhos  118        
POD # 2    Patient resting comfortably in NAD  Afebrile  VSS  Abdomen  soft  not tender  Fundus firm  Extrem.  No Homans  Lochia  nl  Voiding +  Flatus +    Patient s/p R c/s  patient stable and doing well  DC to home  F/U office  3 days     Asymptomatic anemia secondary to acute blood loss at delivery  Monitored and treated with PNV, Folic acid, Vitamin C, Fe 
MELANI GRISSOM is a 29y  now POD#2 s/p repeat  section at 39wks, diagnosed with gHTN during admission (PIH wnl, P:C 0.2).    S:    No acute events overnight.   The patient has no complaints.  Pain controlled with current treatment regimen.   She is ambulating without difficulty and tolerating PO.   + flatus/-BM/+ voiding   She endorses appropriate lochia, which is decreasing.   She is bottle feeding without difficulty.   She denies fevers, chills, nausea and vomiting.   She denies lightheadedness, dizziness, palpitations, chest pain and SOB.     O:    T(C): 36.6 (25 @ 04:03), Max: 36.6 (25 @ 08:07)  HR: 96 (25 @ 04:03) (70 - 97)  BP: 128/86 (25 @ 04:03) (128/84 - 133/83)  RR: 18 (25 @ 04:03) (18 - 18)  SpO2: 96% (25 @ 04:03) (96% - 99%)    Gen: NAD, AOx3  Pulm: Resting comfortably on room air  Abdomen:  Soft, non-tender, non-distended  Incision: Clean/dry/intact with __ in place   Uterus:  Fundus firm below umbilicus  VE:  Expectant lochia  Ext:  Non-tender and non-edematous                          11.2   14.27 )-----------( 168      ( 2025 04:40 )             33.8         136  |  105  |  14.9  ----------------------------<  100[H]  4.6   |  22.0  |  0.57    Ca    8.4      2025 04:40    TPro  5.6[L]  /  Alb  2.7[L]  /  TBili  <0.2[L]  /  DBili  x   /  AST  19  /  ALT  9   /  AlkPhos  118

## 2025-01-28 NOTE — PROGRESS NOTE ADULT - ASSESSMENT
A/P:  MELANI GRISSOM is a 29y  now POD#2 s/p repeat  section at 39wks, diagnosed with gHTN during admission (PIH wnl, P:C 0.2).    -Vital signs stable  -Hgb: 12.8>11.2   -Voiding, tolerating PO, bowel function nml   -Advance care as tolerated   -Continue routine postpartum and postoperative care and education  -Healthy female infant  -Dispo: Pt is stable, doing well and meeting all postpartum and postoperative milestones. Possible discharge to home today pending attending approval.  
A/P: MELANI GRISSOM is a 29y  s/p rCS @39w, now with gHTN (PIH wnl, P:C 0.2).    POD1    #gHTN  -BPs: 108/56 - 138/67 wnl  -Antihypertensives: none indicated at this time  -Denies s/sx PEC   -Discharge with BP cuff and cardio OB referral      #Routine postpartum care  - Stable, doing well postpartum  - Hgb 12.8>11.2  - Pain: well controlled, c/w current regimen  - GI: c/w regular diet, normal bowel function  - : voiding, lochia decreasing  - DVT ppx: SCDs, ambulation encouraged, lovenox   - Healthy baby F  - Dispo: anticipate discharge home tomorrow if meeting all pp and postop milestones and pending att approval

## 2025-01-30 ENCOUNTER — APPOINTMENT (OUTPATIENT)
Dept: OBGYN | Facility: CLINIC | Age: 30
End: 2025-01-30
Payer: COMMERCIAL

## 2025-01-30 VITALS
DIASTOLIC BLOOD PRESSURE: 91 MMHG | WEIGHT: 187 LBS | BODY MASS INDEX: 33.13 KG/M2 | HEIGHT: 63 IN | SYSTOLIC BLOOD PRESSURE: 140 MMHG | HEART RATE: 98 BPM

## 2025-01-30 DIAGNOSIS — O09.899 SUPERVISION OF OTHER HIGH RISK PREGNANCIES, UNSPECIFIED TRIMESTER: ICD-10-CM

## 2025-01-30 DIAGNOSIS — O99.419 DISEASES OF THE CIRCULATORY SYSTEM COMPLICATING PREGNANCY, UNSPECIFIED TRIMESTER: ICD-10-CM

## 2025-01-30 DIAGNOSIS — Z3A.38 38 WEEKS GESTATION OF PREGNANCY: ICD-10-CM

## 2025-01-30 DIAGNOSIS — Z98.891 HISTORY OF UTERINE SCAR FROM PREVIOUS SURGERY: ICD-10-CM

## 2025-01-30 DIAGNOSIS — Z98.890 OTHER SPECIFIED POSTPROCEDURAL STATES: ICD-10-CM

## 2025-01-30 DIAGNOSIS — I47.10 SUPRAVENTRICULAR TACHYCARDIA, UNSPECIFIED: ICD-10-CM

## 2025-01-30 DIAGNOSIS — R23.2 FLUSHING: ICD-10-CM

## 2025-01-30 DIAGNOSIS — I47.10 DISEASES OF THE CIRCULATORY SYSTEM COMPLICATING PREGNANCY, UNSPECIFIED TRIMESTER: ICD-10-CM

## 2025-01-30 DIAGNOSIS — O12.00 GESTATIONAL EDEMA, UNSPECIFIED TRIMESTER: ICD-10-CM

## 2025-01-30 PROCEDURE — 0503F POSTPARTUM CARE VISIT: CPT

## 2025-02-11 ENCOUNTER — APPOINTMENT (OUTPATIENT)
Dept: OBGYN | Facility: CLINIC | Age: 30
End: 2025-02-11
Payer: COMMERCIAL

## 2025-02-11 VITALS
BODY MASS INDEX: 31.01 KG/M2 | RESPIRATION RATE: 16 BRPM | SYSTOLIC BLOOD PRESSURE: 101 MMHG | DIASTOLIC BLOOD PRESSURE: 67 MMHG | HEART RATE: 125 BPM | HEIGHT: 63 IN | WEIGHT: 175 LBS

## 2025-02-11 DIAGNOSIS — N64.4 MASTODYNIA: ICD-10-CM

## 2025-02-11 DIAGNOSIS — N61.0 MASTITIS WITHOUT ABSCESS: ICD-10-CM

## 2025-02-11 PROCEDURE — 0503F POSTPARTUM CARE VISIT: CPT

## 2025-02-11 RX ORDER — CEPHALEXIN 500 MG/1
500 TABLET ORAL 3 TIMES DAILY
Qty: 21 | Refills: 0 | Status: ACTIVE | COMMUNITY
Start: 2025-02-11 | End: 1900-01-01

## 2025-02-13 ENCOUNTER — APPOINTMENT (OUTPATIENT)
Dept: OBGYN | Facility: CLINIC | Age: 30
End: 2025-02-13

## 2025-02-18 ENCOUNTER — APPOINTMENT (OUTPATIENT)
Dept: CARDIOLOGY | Facility: CLINIC | Age: 30
End: 2025-02-18

## 2025-02-20 ENCOUNTER — APPOINTMENT (OUTPATIENT)
Dept: OBGYN | Facility: CLINIC | Age: 30
End: 2025-02-20
Payer: COMMERCIAL

## 2025-02-20 PROCEDURE — 0503F POSTPARTUM CARE VISIT: CPT

## 2025-03-20 ENCOUNTER — APPOINTMENT (OUTPATIENT)
Dept: OBGYN | Facility: CLINIC | Age: 30
End: 2025-03-20
Payer: COMMERCIAL

## 2025-03-20 VITALS
HEART RATE: 82 BPM | DIASTOLIC BLOOD PRESSURE: 80 MMHG | WEIGHT: 172 LBS | SYSTOLIC BLOOD PRESSURE: 114 MMHG | HEIGHT: 63 IN | BODY MASS INDEX: 30.48 KG/M2

## 2025-03-20 DIAGNOSIS — O09.299 SUPERVISION OF PREGNANCY WITH OTHER POOR REPRODUCTIVE OR OBSTETRIC HISTORY, UNSPECIFIED TRIMESTER: ICD-10-CM

## 2025-03-20 DIAGNOSIS — O09.219 SUPERVISION OF PREGNANCY WITH HISTORY OF PRE-TERM LABOR, UNSPECIFIED TRIMESTER: ICD-10-CM

## 2025-03-20 DIAGNOSIS — Z98.891 HISTORY OF UTERINE SCAR FROM PREVIOUS SURGERY: ICD-10-CM

## 2025-03-20 DIAGNOSIS — Z98.890 OTHER SPECIFIED POSTPROCEDURAL STATES: ICD-10-CM

## 2025-03-20 DIAGNOSIS — N61.0 MASTITIS WITHOUT ABSCESS: ICD-10-CM

## 2025-03-20 PROCEDURE — 0503F POSTPARTUM CARE VISIT: CPT

## 2025-03-21 LAB
APPEARANCE: CLEAR
BILIRUBIN URINE: NEGATIVE
BLOOD URINE: NEGATIVE
COLOR: YELLOW
GLUCOSE QUALITATIVE U: NEGATIVE MG/DL
KETONES URINE: NEGATIVE MG/DL
LEUKOCYTE ESTERASE URINE: NEGATIVE
NITRITE URINE: NEGATIVE
PH URINE: 6
PROTEIN URINE: NEGATIVE MG/DL
SPECIFIC GRAVITY URINE: 1.01
UROBILINOGEN URINE: 0.2 MG/DL

## 2025-03-24 LAB — BACTERIA UR CULT: NORMAL

## 2025-04-10 ENCOUNTER — NON-APPOINTMENT (OUTPATIENT)
Age: 30
End: 2025-04-10

## 2025-05-19 ENCOUNTER — APPOINTMENT (OUTPATIENT)
Dept: UROGYNECOLOGY | Facility: CLINIC | Age: 30
End: 2025-05-19

## 2025-05-19 VITALS
DIASTOLIC BLOOD PRESSURE: 86 MMHG | SYSTOLIC BLOOD PRESSURE: 127 MMHG | HEIGHT: 63 IN | WEIGHT: 172 LBS | HEART RATE: 94 BPM | BODY MASS INDEX: 30.48 KG/M2

## 2025-05-19 DIAGNOSIS — R33.9 RETENTION OF URINE, UNSPECIFIED: ICD-10-CM

## 2025-05-19 DIAGNOSIS — R35.1 NOCTURIA: ICD-10-CM

## 2025-05-19 DIAGNOSIS — R32 UNSPECIFIED URINARY INCONTINENCE: ICD-10-CM

## 2025-05-19 DIAGNOSIS — R39.15 URGENCY OF URINATION: ICD-10-CM

## 2025-05-19 DIAGNOSIS — N94.10 UNSPECIFIED DYSPAREUNIA: ICD-10-CM

## 2025-05-19 DIAGNOSIS — R30.0 DYSURIA: ICD-10-CM

## 2025-05-19 DIAGNOSIS — R35.0 FREQUENCY OF MICTURITION: ICD-10-CM

## 2025-05-19 DIAGNOSIS — R39.89 OTHER SYMPTOMS AND SIGNS INVOLVING THE GENITOURINARY SYSTEM: ICD-10-CM

## 2025-05-19 LAB
BILIRUB UR QL STRIP: NEGATIVE
CLARITY UR: CLEAR
COLLECTION METHOD: NORMAL
GLUCOSE UR-MCNC: NEGATIVE
HCG UR QL: 0.2 EU/DL
HGB UR QL STRIP.AUTO: NEGATIVE
KETONES UR-MCNC: NEGATIVE
LEUKOCYTE ESTERASE UR QL STRIP: NEGATIVE
NITRITE UR QL STRIP: NEGATIVE
PH UR STRIP: 5.5
PROT UR STRIP-MCNC: NEGATIVE
SP GR UR STRIP: 1.02

## 2025-05-19 PROCEDURE — 51701 INSERT BLADDER CATHETER: CPT | Mod: 59

## 2025-05-19 PROCEDURE — 99204 OFFICE O/P NEW MOD 45 MIN: CPT | Mod: 25

## 2025-05-19 PROCEDURE — 99459 PELVIC EXAMINATION: CPT

## 2025-05-19 PROCEDURE — 81003 URINALYSIS AUTO W/O SCOPE: CPT | Mod: QW

## 2025-05-19 RX ORDER — PHENAZOPYRIDINE HYDROCHLORIDE 200 MG/1
200 TABLET ORAL
Qty: 28 | Refills: 1 | Status: ACTIVE | COMMUNITY
Start: 2025-05-19 | End: 1900-01-01

## 2025-06-06 ENCOUNTER — APPOINTMENT (OUTPATIENT)
Dept: UROGYNECOLOGY | Facility: CLINIC | Age: 30
End: 2025-06-06
Payer: COMMERCIAL

## 2025-06-06 VITALS — SYSTOLIC BLOOD PRESSURE: 117 MMHG | DIASTOLIC BLOOD PRESSURE: 76 MMHG

## 2025-06-06 PROCEDURE — 51700 IRRIGATION OF BLADDER: CPT

## 2025-06-13 ENCOUNTER — APPOINTMENT (OUTPATIENT)
Dept: UROGYNECOLOGY | Facility: CLINIC | Age: 30
End: 2025-06-13

## 2025-06-13 VITALS — HEART RATE: 99 BPM | DIASTOLIC BLOOD PRESSURE: 77 MMHG | SYSTOLIC BLOOD PRESSURE: 113 MMHG

## 2025-06-13 PROCEDURE — 51700 IRRIGATION OF BLADDER: CPT

## 2025-06-20 ENCOUNTER — APPOINTMENT (OUTPATIENT)
Dept: UROGYNECOLOGY | Facility: CLINIC | Age: 30
End: 2025-06-20

## 2025-06-20 VITALS
DIASTOLIC BLOOD PRESSURE: 82 MMHG | SYSTOLIC BLOOD PRESSURE: 123 MMHG | WEIGHT: 170 LBS | BODY MASS INDEX: 30.12 KG/M2 | HEIGHT: 63 IN

## 2025-06-20 PROCEDURE — 51700 IRRIGATION OF BLADDER: CPT

## 2025-06-27 ENCOUNTER — APPOINTMENT (OUTPATIENT)
Dept: UROGYNECOLOGY | Facility: CLINIC | Age: 30
End: 2025-06-27

## 2025-07-03 ENCOUNTER — APPOINTMENT (OUTPATIENT)
Dept: UROGYNECOLOGY | Facility: CLINIC | Age: 30
End: 2025-07-03

## 2025-07-11 ENCOUNTER — APPOINTMENT (OUTPATIENT)
Dept: UROGYNECOLOGY | Facility: CLINIC | Age: 30
End: 2025-07-11

## 2025-07-11 VITALS — SYSTOLIC BLOOD PRESSURE: 128 MMHG | DIASTOLIC BLOOD PRESSURE: 80 MMHG

## 2025-07-11 PROCEDURE — 51700 IRRIGATION OF BLADDER: CPT

## 2025-07-28 ENCOUNTER — APPOINTMENT (OUTPATIENT)
Dept: UROGYNECOLOGY | Facility: CLINIC | Age: 30
End: 2025-07-28

## (undated) DEVICE — WARMING BLANKET UPPER ADULT

## (undated) DEVICE — VENODYNE/SCD SLEEVE CALF MEDIUM

## (undated) DEVICE — TUBING GYRUS ACMI COLLECTION SET 6FT

## (undated) DEVICE — DRAPE LIGHT HANDLE COVER (GREEN)

## (undated) DEVICE — GLV 8.5 PROTEXIS (BLUE)

## (undated) DEVICE — LAP PAD W RING 18 X 18"

## (undated) DEVICE — CURETTE UTERINE VACURETTE CURVED 8MM

## (undated) DEVICE — PACK LITHOTOMY